# Patient Record
Sex: FEMALE | Race: BLACK OR AFRICAN AMERICAN | NOT HISPANIC OR LATINO | Employment: FULL TIME | URBAN - METROPOLITAN AREA
[De-identification: names, ages, dates, MRNs, and addresses within clinical notes are randomized per-mention and may not be internally consistent; named-entity substitution may affect disease eponyms.]

---

## 2020-02-11 ENCOUNTER — OFFICE VISIT (OUTPATIENT)
Dept: OBSTETRICS AND GYNECOLOGY | Age: 60
End: 2020-02-11

## 2020-02-11 VITALS
BODY MASS INDEX: 22.82 KG/M2 | WEIGHT: 163 LBS | DIASTOLIC BLOOD PRESSURE: 90 MMHG | HEIGHT: 71 IN | SYSTOLIC BLOOD PRESSURE: 138 MMHG

## 2020-02-11 DIAGNOSIS — Z12.11 SCREEN FOR COLON CANCER: ICD-10-CM

## 2020-02-11 DIAGNOSIS — Z01.419 ENCOUNTER FOR GYNECOLOGICAL EXAMINATION WITHOUT ABNORMAL FINDING: ICD-10-CM

## 2020-02-11 DIAGNOSIS — Z12.39 SCREENING FOR BREAST CANCER: ICD-10-CM

## 2020-02-11 DIAGNOSIS — N84.1 CERVICAL POLYP: ICD-10-CM

## 2020-02-11 DIAGNOSIS — N92.6 IRREGULAR MENSES: ICD-10-CM

## 2020-02-11 DIAGNOSIS — N85.2 ENLARGED UTERUS: ICD-10-CM

## 2020-02-11 DIAGNOSIS — Z12.4 SCREENING FOR CERVICAL CANCER: ICD-10-CM

## 2020-02-11 DIAGNOSIS — N95.1 MENOPAUSAL SYMPTOMS: ICD-10-CM

## 2020-02-11 DIAGNOSIS — Z11.51 SCREENING FOR HPV (HUMAN PAPILLOMAVIRUS): ICD-10-CM

## 2020-02-11 DIAGNOSIS — Z76.89 ESTABLISHING CARE WITH NEW DOCTOR, ENCOUNTER FOR: Primary | ICD-10-CM

## 2020-02-11 PROCEDURE — 99386 PREV VISIT NEW AGE 40-64: CPT | Performed by: OBSTETRICS & GYNECOLOGY

## 2020-02-11 PROCEDURE — 57500 BIOPSY OF CERVIX: CPT | Performed by: OBSTETRICS & GYNECOLOGY

## 2020-02-11 NOTE — PROGRESS NOTES
"Routine Annual Visit    2020    Patient: Mehnaz Denney          MR#:[id number]      Chief Complaint   Patient presents with   • Establish Care     NGYN.  Randomly has cycles. Has night sweats. Last pap smear 2016, no hx of abnormal paps.  Mg .  She has never c-scope        History of Present Illness    59 y.o. female  who presents for annual exam.   New pt to me, see written gyn  UL payroll  Fibroids  3 years of irregular bleeding  Does have NS  Due for mammo , pap, CSC  BTL  No recent primary care visit            Patient's last menstrual period was 2019.  Obstetric History:  OB History        1    Para   1    Term           1    AB        Living   2       SAB        TAB        Ectopic        Molar        Multiple   1    Live Births   2               Menstrual History:     Patient's last menstrual period was 2019.       Sexual History:       ________________________________________  There is no problem list on file for this patient.      History reviewed. No pertinent past medical history.    Past Surgical History:   Procedure Laterality Date   • D&C WITH SUCTION         Social History     Tobacco Use   Smoking Status Never Smoker   Smokeless Tobacco Never Used       currently has no medications in their medication list.  ________________________________________    Current contraception: tubal ligation  History of abnormal Pap smear: no  Family history of Breast cancer: no, adopted  Family history of uterine or ovarian cancer: no  Family History of colon cancer/colon polyps: no  History of abnormal mammogram: no      The following portions of the patient's history were reviewed and updated as appropriate: allergies, current medications, past family history, past medical history, past social history, past surgical history and problem list.    Review of Systems    Pertinent items are noted in HPI.     Objective   Physical Exam    /90   Ht 180.3 cm (71\")   Wt " "73.9 kg (163 lb)   LMP 11/24/2019   Breastfeeding No   BMI 22.73 kg/m²    BP Readings from Last 3 Encounters:   02/11/20 138/90      Wt Readings from Last 3 Encounters:   02/11/20 73.9 kg (163 lb)      BMI: Estimated body mass index is 22.73 kg/m² as calculated from the following:    Height as of this encounter: 180.3 cm (71\").    Weight as of this encounter: 73.9 kg (163 lb).      General:   alert, appears stated age and cooperative   Abdomen: soft, non-tender, without masses or organomegaly   Breast: inspection negative, no nipple discharge or bleeding, no masses or nodularity palpable   Vulva: normal   Vagina: normal mucosa   Cervix: no cervical motion tenderness and no lesions   Small polyp seen  Betadine prep  Ring forceps used to twist off soft polyp , about 3 mm  Hemostatic  Pt tolerated well   Uterus: bulky or non-tender   Adnexa: no mass, fullness, tenderness     Assessment:    1. Normal annual exam   Assessment     ICD-10-CM ICD-9-CM   1. Establishing care with new doctor, encounter for Z76.89 V65.8   2. Encounter for gynecological examination without abnormal finding Z01.419 V72.31   3. Menopausal symptoms N95.1 627.2   4. Irregular menses N92.6 626.4   5. Screening for cervical cancer Z12.4 V76.2   6. Screening for HPV (human papillomavirus) Z11.51 V73.81   7. Screen for colon cancer Z12.11 V76.51   8. Cervical polyp N84.1 622.7   9. Screening for breast cancer Z12.39 V76.10   10. Enlarged uterus N85.2 621.2     Plan:    Plan     [x]  Mammogram request made  [x]  PAP done  [x]  Labs:   []  GC/Chl/TV  []  DEXA scan   [x]  Referral for colonoscopy:       Mehnaz was seen today for establish care.    Diagnoses and all orders for this visit:    Establishing care with new doctor, encounter for    Encounter for gynecological examination without abnormal finding    Menopausal symptoms    Irregular menses  -     Comprehensive Metabolic Panel  -     Follicle Stimulating Hormone  -     TSH  -     CBC (No " Diff)    Screening for cervical cancer  -     IGP, Apt HPV,rfx 16 / 18,45    Screening for HPV (human papillomavirus)  -     IGP, Apt HPV,rfx 16 / 18,45    Screen for colon cancer  -     Ambulatory Referral For Screening Colonoscopy    Cervical polyp  -     Biopsy Cervix  -     Reference Histopathology    Screening for breast cancer  -     Mammo Screening Bilateral With CAD; Future    Enlarged uterus      Follow up for gyn US and possible endometrial biopsy  Consider ablation if all benign      Counseling:  --Nutrition: Stressed importance of moderation and caloric balance, stressed fresh fruit and vegetables  --Exercise: Stressed the importance of regular exercise. 3-5 times weekly   - Discussed screening mammogram recommendations.   --Discussed benefits of screening colonoscopy- age 45 unless FH  --Discussed pap smear screening recommendations

## 2020-02-12 LAB
ALBUMIN SERPL-MCNC: 4.2 G/DL (ref 3.5–5.2)
ALBUMIN/GLOB SERPL: 1.1 G/DL
ALP SERPL-CCNC: 117 U/L (ref 39–117)
ALT SERPL-CCNC: 17 U/L (ref 1–33)
AST SERPL-CCNC: 23 U/L (ref 1–32)
BILIRUB SERPL-MCNC: 0.5 MG/DL (ref 0.2–1.2)
BUN SERPL-MCNC: 12 MG/DL (ref 6–20)
BUN/CREAT SERPL: 14.6 (ref 7–25)
CALCIUM SERPL-MCNC: 11.1 MG/DL (ref 8.6–10.5)
CHLORIDE SERPL-SCNC: 100 MMOL/L (ref 98–107)
CO2 SERPL-SCNC: 24.5 MMOL/L (ref 22–29)
CREAT SERPL-MCNC: 0.82 MG/DL (ref 0.57–1)
ERYTHROCYTE [DISTWIDTH] IN BLOOD BY AUTOMATED COUNT: 20.6 % (ref 12.3–15.4)
FSH SERPL-ACNC: 66.3 MIU/ML
GLOBULIN SER CALC-MCNC: 3.7 GM/DL
GLUCOSE SERPL-MCNC: 98 MG/DL (ref 65–99)
HCT VFR BLD AUTO: 35.3 % (ref 34–46.6)
HGB BLD-MCNC: 11 G/DL (ref 12–15.9)
MCH RBC QN AUTO: 20.2 PG (ref 26.6–33)
MCHC RBC AUTO-ENTMCNC: 31.2 G/DL (ref 31.5–35.7)
MCV RBC AUTO: 64.8 FL (ref 79–97)
PLATELET # BLD AUTO: 187 10*3/MM3 (ref 140–450)
POTASSIUM SERPL-SCNC: 4.2 MMOL/L (ref 3.5–5.2)
PROT SERPL-MCNC: 7.9 G/DL (ref 6–8.5)
RBC # BLD AUTO: 5.45 10*6/MM3 (ref 3.77–5.28)
SODIUM SERPL-SCNC: 138 MMOL/L (ref 136–145)
TSH SERPL DL<=0.005 MIU/L-ACNC: 1.73 UIU/ML (ref 0.27–4.2)
WBC # BLD AUTO: 5.97 10*3/MM3 (ref 3.4–10.8)

## 2020-02-12 RX ORDER — FERROUS SULFATE 325(65) MG
325 TABLET ORAL
Qty: 90 TABLET | Refills: 1 | Status: SHIPPED | OUTPATIENT
Start: 2020-02-12 | End: 2020-11-09 | Stop reason: SDUPTHER

## 2020-02-13 LAB
DX ICD CODE: NORMAL
PATH REPORT.FINAL DX SPEC: NORMAL
PATH REPORT.GROSS SPEC: NORMAL
PATH REPORT.SITE OF ORIGIN SPEC: NORMAL
PATHOLOGIST NAME: NORMAL
PAYMENT PROCEDURE: NORMAL

## 2020-02-14 LAB
CYTOLOGIST CVX/VAG CYTO: ABNORMAL
CYTOLOGY CVX/VAG DOC CYTO: ABNORMAL
CYTOLOGY CVX/VAG DOC THIN PREP: ABNORMAL
DX ICD CODE: ABNORMAL
DX ICD CODE: ABNORMAL
HIV 1 & 2 AB SER-IMP: ABNORMAL
HPV I/H RISK 4 DNA CVX QL PROBE+SIG AMP: NEGATIVE
Lab: ABNORMAL
OTHER STN SPEC: ABNORMAL
PATHOLOGIST CVX/VAG CYTO: ABNORMAL
RECOM F/U CVX/VAG CYTO: ABNORMAL
STAT OF ADQ CVX/VAG CYTO-IMP: ABNORMAL

## 2020-02-17 ENCOUNTER — TELEPHONE (OUTPATIENT)
Dept: OBSTETRICS AND GYNECOLOGY | Age: 60
End: 2020-02-17

## 2020-02-17 NOTE — TELEPHONE ENCOUNTER
----- Message from Mabel Soto MD sent at 2/17/2020  7:41 AM EST -----  Notify pap is abnormal, possible endometrial abnormality.  Our plan was to follow up with an US and endometrial biopsy anyway.  I would like to move up her appt to this week.  This afternoon or in the next 2 days, she will need US and endometrial biopsy.  Work in with me

## 2020-02-17 NOTE — TELEPHONE ENCOUNTER
PT NOTIFIED.  SHE COULD NOT COME IN TOMORROW.  SHE PREFERS Wednesday MORNING.  I DO NOT SEE AN ULTRASOUND. COULD WE WORK IN??

## 2020-02-18 ENCOUNTER — APPOINTMENT (OUTPATIENT)
Dept: WOMENS IMAGING | Facility: HOSPITAL | Age: 60
End: 2020-02-18

## 2020-02-18 ENCOUNTER — PROCEDURE VISIT (OUTPATIENT)
Dept: OBSTETRICS AND GYNECOLOGY | Age: 60
End: 2020-02-18

## 2020-02-18 DIAGNOSIS — Z12.31 VISIT FOR SCREENING MAMMOGRAM: Primary | ICD-10-CM

## 2020-02-18 PROCEDURE — 77067 SCR MAMMO BI INCL CAD: CPT | Performed by: OBSTETRICS & GYNECOLOGY

## 2020-02-18 PROCEDURE — 77067 SCR MAMMO BI INCL CAD: CPT | Performed by: RADIOLOGY

## 2020-02-18 NOTE — TELEPHONE ENCOUNTER
Would you please put on Dr. Soto's schedule at 10:30 and U/S at 10:30  Gyn endo bx  Pt notified of appt time     Thanks

## 2020-02-19 ENCOUNTER — PROCEDURE VISIT (OUTPATIENT)
Dept: OBSTETRICS AND GYNECOLOGY | Age: 60
End: 2020-02-19

## 2020-02-19 ENCOUNTER — OFFICE VISIT (OUTPATIENT)
Dept: OBSTETRICS AND GYNECOLOGY | Age: 60
End: 2020-02-19

## 2020-02-19 VITALS
DIASTOLIC BLOOD PRESSURE: 92 MMHG | HEIGHT: 71 IN | WEIGHT: 158 LBS | BODY MASS INDEX: 22.12 KG/M2 | SYSTOLIC BLOOD PRESSURE: 160 MMHG

## 2020-02-19 DIAGNOSIS — R87.619 ENDOMETRIAL CELLS ON CERVICAL PAP SMEAR INCONSISTENT WITH LAST MENSTRUAL PERIOD: ICD-10-CM

## 2020-02-19 DIAGNOSIS — D50.0 IRON DEFICIENCY ANEMIA DUE TO CHRONIC BLOOD LOSS: ICD-10-CM

## 2020-02-19 DIAGNOSIS — N92.6 IRREGULAR MENSES: Primary | ICD-10-CM

## 2020-02-19 DIAGNOSIS — D25.1 INTRAMURAL LEIOMYOMA OF UTERUS: ICD-10-CM

## 2020-02-19 PROCEDURE — 99213 OFFICE O/P EST LOW 20 MIN: CPT | Performed by: OBSTETRICS & GYNECOLOGY

## 2020-02-19 PROCEDURE — 76830 TRANSVAGINAL US NON-OB: CPT | Performed by: OBSTETRICS & GYNECOLOGY

## 2020-02-19 PROCEDURE — 58100 BIOPSY OF UTERUS LINING: CPT | Performed by: OBSTETRICS & GYNECOLOGY

## 2020-02-19 NOTE — PROGRESS NOTES
"GYN Visit    2020    Patient: Mehnaz Denney          MR#:[id number]      Chief Complaint   Patient presents with   • Follow-up     ENDO BX AND U/S WITH DW.        History of Present Illness    59 y.o. female  who presents for  Follow up to irregular menses  Also endometrial cells on pap  Tests menopausal  Had a cervical polyp which was removed  See US , bulky fibroid uterus, 5 fibroids measured  rec endometrial biopsy but lining was fairly thin        No LMP recorded. Patient is premenopausal.    ________________________________________  There is no problem list on file for this patient.      Past Medical History:   Diagnosis Date   • Uterine fibroid        Past Surgical History:   Procedure Laterality Date   •  SECTION      TWINS   • D&C WITH SUCTION     • D&C WITH SUCTION     • LAPAROSCOPIC TUBAL LIGATION         Social History     Tobacco Use   Smoking Status Never Smoker   Smokeless Tobacco Never Used       has a current medication list which includes the following prescription(s): ferrous sulfate.  ________________________________________    Current contraception: tubal ligation      The following portions of the patient's history were reviewed and updated as appropriate: allergies, current medications, past family history, past medical history, past social history, past surgical history and problem list.    Review of Systems    Pertinent items are noted in HPI.     Objective   Physical Exam    /92   Ht 180.3 cm (71\")   Wt 71.7 kg (158 lb)   Breastfeeding No   BMI 22.04 kg/m²    BP Readings from Last 3 Encounters:   20 160/92   20 138/90      Wt Readings from Last 3 Encounters:   20 71.7 kg (158 lb)   20 73.9 kg (163 lb)      BMI: Estimated body mass index is 22.04 kg/m² as calculated from the following:    Height as of this encounter: 180.3 cm (71\").    Weight as of this encounter: 71.7 kg (158 lb).    Lungs: non labored breathing, no wheezing " or tachpnea  Extremities: extremities normal, atraumatic, no cyanosis or edema  Skin: Skin color, texture, turgor normal. No rashes or lesions  Neurologic: Grossly normal  General:   alert, appears stated age and cooperative   Abdomen: soft, non-tender, without masses or organomegaly       Vulva: normal   Vagina: normal mucosa   Cervix: no cervical motion tenderness and no lesions   Uterus: bulky or non-tender   Adnexa: no mass, fullness, tenderness     Endometrial Biopsy Procedure Note    Pre-operative Diagnosis: abnormal pap    Post-operative Diagnosis: same    Indications: abnormal uterine bleeding, enlarged uterus    Procedure Details    Urine pregnancy test was not done.  The risks (including infection, bleeding, pain, and uterine perforation) and benefits of the procedure were explained to the patient and Verbal informed consent was obtained.  Antibiotic prophylaxis against endocarditis was not indicated.     The patient was placed in the dorsal lithotomy position.  Bimanual exam showed the uterus to be in the neutral position.  A Graves' speculum inserted in the vagina, and the cervix prepped with povidone iodine.  Endocervical curettage with a Kevorkian curette was not performed.     A sharp tenaculum was applied to the anterior lip of the cervix for stabilization.  A sterile uterine sound was used to sound the uterus to a depth of 7cm.  A Pipelle endometrial aspirator was used to sample the endometrium.  Sample was sent for pathologic examination.  2 passes   Very little tissue, atrophic type feel    Condition:  Stable    Complications:  None  Patient tolerated the procedure well without complications.    Plan:    The patient was advised to call for any fever or for prolonged or severe pain or bleeding. She was advised to use OTC ibuprofen as needed for mild to moderate pain. She was advised to avoid vaginal intercourse for 48 hours or until the bleeding has completely stopped.    Attending Physician  Documentation:  I was present for or participated in the entire procedure, including opening and closing.   Assessment:      Mehnaz was seen today for follow-up.    Diagnoses and all orders for this visit:    Irregular menses  -     Biopsy Endometrial    Intramural leiomyoma of uterus    Endometrial cells on cervical Pap smear inconsistent with last menstrual period    Iron deficiency anemia due to chronic blood loss      Take iron daily  Follow up 3 months to evaluate fibroid stability  Await biopsy result  Consider hysterectomy  Pt cannot take time off work right now, she tests menopausal and if no bleeding and negative biopsy can consider following fibroids which should slowly decrease in size

## 2020-02-24 ENCOUNTER — TELEPHONE (OUTPATIENT)
Dept: OBSTETRICS AND GYNECOLOGY | Age: 60
End: 2020-02-24

## 2020-02-24 NOTE — TELEPHONE ENCOUNTER
----- Message from Mabel Soto MD sent at 2/24/2020  1:07 PM EST -----  Notify negative biopsy , good

## 2020-02-26 ENCOUNTER — TELEPHONE (OUTPATIENT)
Dept: OBSTETRICS AND GYNECOLOGY | Age: 60
End: 2020-02-26

## 2020-02-26 DIAGNOSIS — R92.8 ABNORMAL MAMMOGRAM: Primary | ICD-10-CM

## 2020-02-26 NOTE — TELEPHONE ENCOUNTER
----- Message from Mabel Soto MD sent at 2/26/2020  9:07 AM EST -----  Notify radiologist requests bilateral breast US, appears may have breast cysts, these are usually benign, I will place order

## 2020-03-04 ENCOUNTER — OFFICE VISIT (OUTPATIENT)
Dept: FAMILY MEDICINE CLINIC | Facility: CLINIC | Age: 60
End: 2020-03-04

## 2020-03-04 VITALS
RESPIRATION RATE: 18 BRPM | OXYGEN SATURATION: 100 % | HEART RATE: 95 BPM | WEIGHT: 160 LBS | SYSTOLIC BLOOD PRESSURE: 166 MMHG | HEIGHT: 71 IN | TEMPERATURE: 98.7 F | DIASTOLIC BLOOD PRESSURE: 86 MMHG | BODY MASS INDEX: 22.4 KG/M2

## 2020-03-04 DIAGNOSIS — D50.0 IRON DEFICIENCY ANEMIA DUE TO CHRONIC BLOOD LOSS: ICD-10-CM

## 2020-03-04 DIAGNOSIS — I10 ESSENTIAL HYPERTENSION: ICD-10-CM

## 2020-03-04 DIAGNOSIS — Z13.220 SCREENING, LIPID: Primary | ICD-10-CM

## 2020-03-04 PROCEDURE — 99204 OFFICE O/P NEW MOD 45 MIN: CPT | Performed by: INTERNAL MEDICINE

## 2020-03-04 RX ORDER — LISINOPRIL 10 MG/1
10 TABLET ORAL DAILY
Qty: 30 TABLET | Refills: 5 | Status: SHIPPED | OUTPATIENT
Start: 2020-03-04 | End: 2020-05-04

## 2020-03-04 NOTE — PROGRESS NOTES
Subjective   Mehnaz Denney is a 59 y.o. female. Patient is here today for   Chief Complaint   Patient presents with   • Establish Care   • Hypertension          Vitals:    03/04/20 0858   BP: 166/86   Pulse: 95   Resp: 18   Temp: 98.7 °F (37.1 °C)   SpO2: 100%     Body mass index is 22.33 kg/m².      Past Medical History:   Diagnosis Date   • Uterine fibroid       No Known Allergies   Social History     Socioeconomic History   • Marital status: Single     Spouse name: Not on file   • Number of children: Not on file   • Years of education: Not on file   • Highest education level: Not on file   Occupational History     Employer: [employer]   Tobacco Use   • Smoking status: Never Smoker   • Smokeless tobacco: Never Used   Substance and Sexual Activity   • Alcohol use: Never     Frequency: Never   • Drug use: Never        Current Outpatient Medications:   •  ferrous sulfate 325 (65 FE) MG tablet, Take 1 tablet by mouth Daily With Breakfast., Disp: 90 tablet, Rfl: 1  •  Multiple Vitamin (MULTI-VITAMIN DAILY PO), Take  by mouth Daily., Disp: , Rfl:   •  lisinopril (PRINIVIL,ZESTRIL) 10 MG tablet, Take 1 tablet by mouth Daily., Disp: 30 tablet, Rfl: 5     Objective     This pleasant 59-year-old patient is new to my office.  She had been seen by her oncologist recently and was told that her blood pressure was just a little elevated.    A friend of hers, and a patient of mine (Ms. Bianca Ayers) had recommended that she see me.    Apart from her concern about her blood pressure, she is going for further imaging studies regarding some breast cysts.    She does not smoke cigarettes, she drinks alcohol infrequently, she is adopted and does not know her family history.    She has 2 children I believe, 1 of them has hypertension.        Hypertension   This is a new problem. The current episode started more than 1 month ago. The problem has been waxing and waning since onset. The problem is resistant. Pertinent  negatives include no anxiety, blurred vision, chest pain, headaches, malaise/fatigue, neck pain, orthopnea, palpitations, peripheral edema, PND, shortness of breath or sweats. There are no associated agents to hypertension. There are no known risk factors for coronary artery disease. There are no compliance problems.         Review of Systems   Constitutional: Negative.  Negative for malaise/fatigue.   HENT: Negative.    Eyes: Negative for blurred vision.   Respiratory: Negative for shortness of breath.    Cardiovascular: Negative for chest pain, palpitations, orthopnea and PND.   Genitourinary: Negative.    Musculoskeletal: Negative for neck pain.   Neurological: Negative for headaches.   Psychiatric/Behavioral: Negative.        Physical Exam   Constitutional: She is oriented to person, place, and time. She appears well-developed and well-nourished.   Pleasant, neatly groomed, BMI 22.3.   HENT:   Head: Normocephalic and atraumatic.   Neck:   No carotid bruits.   Cardiovascular: Normal rate, regular rhythm and normal heart sounds. Exam reveals no friction rub.   No murmur heard.  Pulmonary/Chest: Effort normal and breath sounds normal. No stridor. No respiratory distress. She has no wheezes. She has no rales.   Neurological: She is alert and oriented to person, place, and time.   Psychiatric: She has a normal mood and affect. Her behavior is normal. Thought content normal.   Nursing note and vitals reviewed.        Problem List Items Addressed This Visit        Cardiovascular and Mediastinum    Essential hypertension    Relevant Medications    lisinopril (PRINIVIL,ZESTRIL) 10 MG tablet       Hematopoietic and Hemostatic    Iron deficiency anemia due to chronic blood loss    Relevant Orders    Iron Profile    CBC & Differential      Other Visit Diagnoses     Screening, lipid    -  Primary    Relevant Orders    Lipid Panel With LDL / HDL Ratio            PLAN  Like to have her back in 2 to 3 weeks.  Prior to that  visit, I would like to get an EKG and a chest x-ray just to look for any end-stage organ damage following longstanding hypertension.    A CBC, comprehensive metabolic panel and thyroid were checked recently by her gynecologist.    I would like to check a CBC, iron level, and a screening lipid profile.    We will start her on lisinopril 10 mg daily.  Return in about 3 weeks (around 3/25/2020) for ekg and cxr before next visit.  Answers for HPI/ROS submitted by the patient on 2/27/2020   Hypertension  What is the primary reason for your visit?: High Blood Pressure

## 2020-03-05 LAB
BASOPHILS # BLD AUTO: ABNORMAL 10*3/UL
BASOPHILS # BLD MANUAL: 0.03 10*3/MM3 (ref 0–0.2)
BASOPHILS NFR BLD MANUAL: 1 % (ref 0–1.5)
CHOLEST SERPL-MCNC: 208 MG/DL (ref 0–200)
DIFFERENTIAL COMMENT: ABNORMAL
EOSINOPHIL # BLD AUTO: ABNORMAL 10*3/UL
EOSINOPHIL # BLD MANUAL: 0.17 10*3/MM3 (ref 0–0.4)
EOSINOPHIL NFR BLD AUTO: ABNORMAL %
EOSINOPHIL NFR BLD MANUAL: 6 % (ref 0.3–6.2)
ERYTHROCYTE [DISTWIDTH] IN BLOOD BY AUTOMATED COUNT: 21 % (ref 12.3–15.4)
HCT VFR BLD AUTO: 37.8 % (ref 34–46.6)
HDLC SERPL-MCNC: 64 MG/DL (ref 40–60)
HGB BLD-MCNC: 11.5 G/DL (ref 12–15.9)
IRON SATN MFR SERPL: 8 % (ref 20–50)
IRON SERPL-MCNC: 33 MCG/DL (ref 37–145)
LDLC SERPL CALC-MCNC: 126 MG/DL (ref 0–100)
LDLC/HDLC SERPL: 1.96 {RATIO}
LYMPHOCYTES # BLD AUTO: ABNORMAL 10*3/UL
LYMPHOCYTES # BLD MANUAL: 0.67 10*3/MM3 (ref 0.7–3.1)
LYMPHOCYTES NFR BLD AUTO: ABNORMAL %
LYMPHOCYTES NFR BLD MANUAL: 24 % (ref 19.6–45.3)
MCH RBC QN AUTO: 20.3 PG (ref 26.6–33)
MCHC RBC AUTO-ENTMCNC: 30.4 G/DL (ref 31.5–35.7)
MCV RBC AUTO: 66.7 FL (ref 79–97)
MONOCYTES # BLD MANUAL: 0.22 10*3/MM3 (ref 0.1–0.9)
MONOCYTES NFR BLD AUTO: ABNORMAL %
MONOCYTES NFR BLD MANUAL: 8 % (ref 5–12)
NEUTROPHILS # BLD MANUAL: 1.71 10*3/MM3 (ref 1.7–7)
NEUTROPHILS NFR BLD AUTO: ABNORMAL %
NEUTROPHILS NFR BLD MANUAL: 61 % (ref 42.7–76)
PLATELET # BLD AUTO: 222 10*3/MM3 (ref 140–450)
PLATELET BLD QL SMEAR: ABNORMAL
RBC # BLD AUTO: 5.67 10*6/MM3 (ref 3.77–5.28)
RBC MORPH BLD: ABNORMAL
TIBC SERPL-MCNC: 410 MCG/DL
TRIGL SERPL-MCNC: 92 MG/DL (ref 0–150)
UIBC SERPL-MCNC: 377 MCG/DL (ref 112–346)
VLDLC SERPL CALC-MCNC: 18.4 MG/DL
WBC # BLD AUTO: 2.81 10*3/MM3 (ref 3.4–10.8)

## 2020-05-04 ENCOUNTER — OFFICE VISIT (OUTPATIENT)
Dept: FAMILY MEDICINE CLINIC | Facility: CLINIC | Age: 60
End: 2020-05-04

## 2020-05-04 VITALS
BODY MASS INDEX: 22.62 KG/M2 | WEIGHT: 158 LBS | HEIGHT: 70 IN | OXYGEN SATURATION: 98 % | DIASTOLIC BLOOD PRESSURE: 82 MMHG | TEMPERATURE: 98.2 F | HEART RATE: 102 BPM | SYSTOLIC BLOOD PRESSURE: 130 MMHG | RESPIRATION RATE: 16 BRPM

## 2020-05-04 DIAGNOSIS — D50.0 IRON DEFICIENCY ANEMIA DUE TO CHRONIC BLOOD LOSS: ICD-10-CM

## 2020-05-04 DIAGNOSIS — I10 ESSENTIAL HYPERTENSION: Primary | ICD-10-CM

## 2020-05-04 PROCEDURE — 99213 OFFICE O/P EST LOW 20 MIN: CPT | Performed by: INTERNAL MEDICINE

## 2020-05-04 PROCEDURE — 93000 ELECTROCARDIOGRAM COMPLETE: CPT | Performed by: INTERNAL MEDICINE

## 2020-05-04 RX ORDER — LISINOPRIL AND HYDROCHLOROTHIAZIDE 12.5; 1 MG/1; MG/1
1 TABLET ORAL DAILY
Qty: 30 TABLET | Refills: 5 | Status: SHIPPED | OUTPATIENT
Start: 2020-05-04 | End: 2020-10-05 | Stop reason: SDUPTHER

## 2020-05-08 ENCOUNTER — DOCUMENTATION (OUTPATIENT)
Dept: OBSTETRICS AND GYNECOLOGY | Age: 60
End: 2020-05-08

## 2020-05-20 ENCOUNTER — OFFICE VISIT (OUTPATIENT)
Dept: OBSTETRICS AND GYNECOLOGY | Age: 60
End: 2020-05-20

## 2020-05-20 ENCOUNTER — PROCEDURE VISIT (OUTPATIENT)
Dept: OBSTETRICS AND GYNECOLOGY | Age: 60
End: 2020-05-20

## 2020-05-20 VITALS
BODY MASS INDEX: 22.12 KG/M2 | SYSTOLIC BLOOD PRESSURE: 118 MMHG | DIASTOLIC BLOOD PRESSURE: 70 MMHG | WEIGHT: 158 LBS | HEIGHT: 71 IN

## 2020-05-20 DIAGNOSIS — I10 ESSENTIAL HYPERTENSION: ICD-10-CM

## 2020-05-20 DIAGNOSIS — D50.0 IRON DEFICIENCY ANEMIA DUE TO CHRONIC BLOOD LOSS: ICD-10-CM

## 2020-05-20 DIAGNOSIS — D25.1 INTRAMURAL LEIOMYOMA OF UTERUS: ICD-10-CM

## 2020-05-20 DIAGNOSIS — D25.9 UTERINE LEIOMYOMA, UNSPECIFIED LOCATION: Primary | ICD-10-CM

## 2020-05-20 DIAGNOSIS — D50.0 IRON DEFICIENCY ANEMIA DUE TO CHRONIC BLOOD LOSS: Primary | ICD-10-CM

## 2020-05-20 PROCEDURE — 99213 OFFICE O/P EST LOW 20 MIN: CPT | Performed by: PHYSICIAN ASSISTANT

## 2020-05-20 PROCEDURE — 76830 TRANSVAGINAL US NON-OB: CPT | Performed by: OBSTETRICS & GYNECOLOGY

## 2020-05-20 NOTE — PROGRESS NOTES
"Subjective     Chief Complaint   Patient presents with   • Fibroids     3 month follow up on fibroids.       Mehnaz Denney is a 59 y.o.  whose LMP is No LMP recorded. Patient is premenopausal. presents for f/u of her fibroids  She is doing well  Feels better on iron and her BP is better controlled  Has been seeing her PCP regularly  Feels better gynecologically as well  No VB  Does note occasional heaviness and bladder frequency    Pt of Dr Soto      No Additional Complaints Reported    The following portions of the patient's history were reviewed and updated as appropriate:vital signs, allergies, current medications, past family history, past medical history, past social history, past surgical history and problem list      Review of Systems   Genitourinary:positive for fibroid f/u     Objective      /70   Ht 180.3 cm (71\")   Wt 71.7 kg (158 lb)   Breastfeeding No   BMI 22.04 kg/m²     Physical Exam    General:   alert, comfortable and no distress   Heart: Not performed today   Lungs: Not performed today.   Breast: Not performed today   Neck: na   Abdomen: {Not performed today   CVA: Not performed today   Pelvis: Not performed today   Extremities: Not performed today   Neurologic: negative   Psychiatric: Normal affect, judgement, and mood       Lab Review   Labs: No data reviewed     Imaging   Ultrasound - Pelvic Vaginal  Endo thickness measures 7.91 mm. 6 fibroids noted measuring 3.1 x 4.2, 3.6 x 2.4, 2.8 x 3.2, 3.2 x  3.3, 2.3 x 1.7, 4.0 x 3.3 cm    Assessment/Plan     ASSESSMENT  1. Uterine leiomyoma, unspecified location    2. Iron deficiency anemia due to chronic blood loss        PLAN  1. Fibroids stable.  Pt would like to have hysterectomy at some point d/t the \"heaviness\" she feels.  Would like to f/u in August to discuss this further with dr Soto. Is waiting for her insurance to be active for at lease 1 year to be covered for FMLA    C/w iron daily.  Call for any problems. Will f/u " with PCP in August as well for rpt labs    Follow up: 3 month(s)    JUAN F Mims  5/20/2020

## 2020-08-04 DIAGNOSIS — I10 ESSENTIAL HYPERTENSION: ICD-10-CM

## 2020-08-04 DIAGNOSIS — D50.0 IRON DEFICIENCY ANEMIA DUE TO CHRONIC BLOOD LOSS: Primary | ICD-10-CM

## 2020-09-02 LAB
ALBUMIN SERPL-MCNC: 4.2 G/DL (ref 3.5–5.2)
ALBUMIN/GLOB SERPL: 1.2 G/DL
ALP SERPL-CCNC: 91 U/L (ref 39–117)
ALT SERPL-CCNC: 14 U/L (ref 1–33)
AST SERPL-CCNC: 18 U/L (ref 1–32)
BASOPHILS # BLD MANUAL: 0 10*3/MM3 (ref 0–0.2)
BASOPHILS NFR BLD MANUAL: 0 % (ref 0–1.5)
BILIRUB SERPL-MCNC: 0.5 MG/DL (ref 0–1.2)
BUN SERPL-MCNC: 11 MG/DL (ref 6–20)
BUN/CREAT SERPL: 12.5 (ref 7–25)
CALCIUM SERPL-MCNC: 11 MG/DL (ref 8.6–10.5)
CHLORIDE SERPL-SCNC: 103 MMOL/L (ref 98–107)
CHOLEST SERPL-MCNC: 227 MG/DL (ref 0–200)
CO2 SERPL-SCNC: 30.2 MMOL/L (ref 22–29)
CREAT SERPL-MCNC: 0.88 MG/DL (ref 0.57–1)
DIFFERENTIAL COMMENT: NORMAL
EOSINOPHIL # BLD MANUAL: 0.18 10*3/MM3 (ref 0–0.4)
EOSINOPHIL NFR BLD MANUAL: 5 % (ref 0.3–6.2)
ERYTHROCYTE [DISTWIDTH] IN BLOOD BY AUTOMATED COUNT: 14.8 % (ref 12.3–15.4)
GLOBULIN SER CALC-MCNC: 3.5 GM/DL
GLUCOSE SERPL-MCNC: 93 MG/DL (ref 65–99)
HCT VFR BLD AUTO: 36.5 % (ref 34–46.6)
HDLC SERPL-MCNC: 61 MG/DL (ref 40–60)
HGB BLD-MCNC: 12.1 G/DL (ref 12–15.9)
IRON SATN MFR SERPL: 10 % (ref 20–50)
IRON SERPL-MCNC: 36 MCG/DL (ref 37–145)
LDLC SERPL CALC-MCNC: 151 MG/DL (ref 0–100)
LDLC/HDLC SERPL: 2.47 {RATIO}
LYMPHOCYTES # BLD MANUAL: 0.76 10*3/MM3 (ref 0.7–3.1)
LYMPHOCYTES NFR BLD MANUAL: 21 % (ref 19.6–45.3)
MCH RBC QN AUTO: 24.6 PG (ref 26.6–33)
MCHC RBC AUTO-ENTMCNC: 33.2 G/DL (ref 31.5–35.7)
MCV RBC AUTO: 74.2 FL (ref 79–97)
MONOCYTES # BLD MANUAL: 0.29 10*3/MM3 (ref 0.1–0.9)
MONOCYTES NFR BLD MANUAL: 8 % (ref 5–12)
NEUTROPHILS # BLD MANUAL: 2.38 10*3/MM3 (ref 1.7–7)
NEUTROPHILS NFR BLD MANUAL: 66 % (ref 42.7–76)
NRBC BLD AUTO-RTO: 0 /100 WBC (ref 0–0.2)
PLATELET # BLD AUTO: 186 10*3/MM3 (ref 140–450)
PLATELET BLD QL SMEAR: NORMAL
POTASSIUM SERPL-SCNC: 3.6 MMOL/L (ref 3.5–5.2)
PROT SERPL-MCNC: 7.7 G/DL (ref 6–8.5)
RBC # BLD AUTO: 4.92 10*6/MM3 (ref 3.77–5.28)
RBC MORPH BLD: NORMAL
SODIUM SERPL-SCNC: 139 MMOL/L (ref 136–145)
TIBC SERPL-MCNC: 347 MCG/DL
TRIGL SERPL-MCNC: 77 MG/DL (ref 0–150)
UIBC SERPL-MCNC: 311 MCG/DL (ref 112–346)
VLDLC SERPL CALC-MCNC: 15.4 MG/DL (ref 5–40)
WBC # BLD AUTO: 3.61 10*3/MM3 (ref 3.4–10.8)

## 2020-09-10 ENCOUNTER — OFFICE VISIT (OUTPATIENT)
Dept: FAMILY MEDICINE CLINIC | Facility: CLINIC | Age: 60
End: 2020-09-10

## 2020-09-10 VITALS
BODY MASS INDEX: 22.85 KG/M2 | HEIGHT: 71 IN | OXYGEN SATURATION: 98 % | WEIGHT: 163.2 LBS | DIASTOLIC BLOOD PRESSURE: 60 MMHG | TEMPERATURE: 98.2 F | HEART RATE: 59 BPM | RESPIRATION RATE: 18 BRPM | SYSTOLIC BLOOD PRESSURE: 104 MMHG

## 2020-09-10 DIAGNOSIS — E78.00 HYPERCHOLESTEROLEMIA: ICD-10-CM

## 2020-09-10 DIAGNOSIS — I10 ESSENTIAL HYPERTENSION: ICD-10-CM

## 2020-09-10 DIAGNOSIS — D50.0 IRON DEFICIENCY ANEMIA DUE TO CHRONIC BLOOD LOSS: Primary | ICD-10-CM

## 2020-09-10 PROCEDURE — 99214 OFFICE O/P EST MOD 30 MIN: CPT | Performed by: INTERNAL MEDICINE

## 2020-09-13 PROBLEM — E78.00 HYPERCHOLESTEROLEMIA: Status: RESOLVED | Noted: 2020-09-13 | Resolved: 2020-09-13

## 2020-09-13 PROBLEM — E78.00 HYPERCHOLESTEROLEMIA: Status: ACTIVE | Noted: 2020-09-13

## 2020-09-13 NOTE — PROGRESS NOTES
Subjective   Mehnaz Denney is a 60 y.o. female. Patient is here today for   Chief Complaint   Patient presents with   • Hypertension     lab f/u.           Vitals:    09/10/20 1512   BP: 104/60   Pulse: 59   Resp: 18   Temp: 98.2 °F (36.8 °C)   SpO2: 98%     Body mass index is 22.77 kg/m².      Past Medical History:   Diagnosis Date   • Uterine fibroid       No Known Allergies   Social History     Socioeconomic History   • Marital status: Single     Spouse name: Not on file   • Number of children: Not on file   • Years of education: Not on file   • Highest education level: Not on file   Occupational History     Employer: [employer]   Tobacco Use   • Smoking status: Never Smoker   • Smokeless tobacco: Never Used   Substance and Sexual Activity   • Alcohol use: Never     Frequency: Never   • Drug use: Never   • Sexual activity: Defer     Partners: Male     Birth control/protection: Tubal ligation        Current Outpatient Medications:   •  ferrous sulfate 325 (65 FE) MG tablet, Take 1 tablet by mouth Daily With Breakfast., Disp: 90 tablet, Rfl: 1  •  lisinopril-hydrochlorothiazide (Zestoretic) 10-12.5 MG per tablet, Take 1 tablet by mouth Daily., Disp: 30 tablet, Rfl: 5  •  Multiple Vitamin (MULTI-VITAMIN DAILY PO), Take  by mouth Daily., Disp: , Rfl:      Objective     This pleasant lady is here to follow-up on hypertension and labs done last week.    Hypertension         Review of Systems   Constitutional: Negative.    HENT: Negative.    Respiratory: Negative.    Cardiovascular: Negative.    Musculoskeletal: Negative.    Psychiatric/Behavioral: Negative.        Physical Exam  Vitals signs and nursing note reviewed.   Constitutional:       Appearance: Normal appearance. She is normal weight.      Comments: Pleasant, neatly groomed, in no distress.   Cardiovascular:      Rate and Rhythm: Normal rate and regular rhythm.   Pulmonary:      Effort: Pulmonary effort is normal.      Breath sounds: Normal  breath sounds.   Neurological:      Mental Status: She is alert.   Psychiatric:         Mood and Affect: Mood normal.         Behavior: Behavior normal.           Problem List Items Addressed This Visit        Cardiovascular and Mediastinum    Essential hypertension    Hypercholesterolemia       Hematopoietic and Hemostatic    Iron deficiency anemia due to chronic blood loss - Primary            PLAN  Her hypertension is well controlled    Her anemia has resolved though she does remain active.  Stopping iron supplements recently.  I asked her to revert back to her 25 mg with 65 elemental iron once daily.    Cholesterol did not advance atypical her.    I asked her to follow-up once yearly for a comprehensive physical exam.    I will see her back in about 6 months.  Fasting labs prior to that visit include: Iron studies, CBC, comprehensive metabolic, CBC, urinalysis, lipid profile.  No follow-ups on file.

## 2020-10-02 ENCOUNTER — TELEPHONE (OUTPATIENT)
Dept: OBSTETRICS AND GYNECOLOGY | Age: 60
End: 2020-10-02

## 2020-10-02 NOTE — TELEPHONE ENCOUNTER
Patient is calling to request a New RX for the following.      lisinopril-hydrochlorothiazide (Zestoretic) 10-12.5 MG per tablet         [place] [id number] - [facility] [address] [facility] - [phone]  - [phone] FX  [phone]    Please advise.    Patient call back [phone]    Patient is completely out of the medication.

## 2020-10-05 RX ORDER — LISINOPRIL AND HYDROCHLOROTHIAZIDE 12.5; 1 MG/1; MG/1
1 TABLET ORAL DAILY
Qty: 30 TABLET | Refills: 5 | Status: SHIPPED | OUTPATIENT
Start: 2020-10-05 | End: 2021-01-20

## 2020-10-26 ENCOUNTER — TELEPHONE (OUTPATIENT)
Dept: OBSTETRICS AND GYNECOLOGY | Age: 60
End: 2020-10-26

## 2020-10-26 NOTE — TELEPHONE ENCOUNTER
(Charles pt) PT called, states she is menopausal.  Has had some pinkish spotting last night.  Spotting has stopped for now but wanted to know if she need to be seen.    Please advise.    [phone]

## 2020-11-03 ENCOUNTER — PROCEDURE VISIT (OUTPATIENT)
Dept: OBSTETRICS AND GYNECOLOGY | Age: 60
End: 2020-11-03

## 2020-11-03 ENCOUNTER — OFFICE VISIT (OUTPATIENT)
Dept: OBSTETRICS AND GYNECOLOGY | Age: 60
End: 2020-11-03

## 2020-11-03 VITALS
BODY MASS INDEX: 22.26 KG/M2 | HEIGHT: 71 IN | WEIGHT: 159 LBS | SYSTOLIC BLOOD PRESSURE: 115 MMHG | DIASTOLIC BLOOD PRESSURE: 64 MMHG

## 2020-11-03 DIAGNOSIS — N95.0 PMB (POSTMENOPAUSAL BLEEDING): Primary | ICD-10-CM

## 2020-11-03 DIAGNOSIS — D25.1 INTRAMURAL LEIOMYOMA OF UTERUS: ICD-10-CM

## 2020-11-03 DIAGNOSIS — N95.0 POSTMENOPAUSAL BLEEDING: Primary | ICD-10-CM

## 2020-11-03 PROCEDURE — 76830 TRANSVAGINAL US NON-OB: CPT | Performed by: OBSTETRICS & GYNECOLOGY

## 2020-11-03 PROCEDURE — 99213 OFFICE O/P EST LOW 20 MIN: CPT | Performed by: OBSTETRICS & GYNECOLOGY

## 2020-11-03 RX ORDER — CEFAZOLIN SODIUM 2 G/100ML
2 INJECTION, SOLUTION INTRAVENOUS ONCE
Status: CANCELLED | OUTPATIENT
Start: 2021-01-15 | End: 2020-11-03

## 2020-11-03 NOTE — PROGRESS NOTES
GYN Visit    11/3/2020    Patient: Mehnaz Denney          MR#:[id number]      Chief Complaint   Patient presents with   • Gynecologic Exam     Post menopausal bleeding (spotting)       History of Present Illness    60 y.o. female  who presents for bleeding, light spotting  Biopsy 5 months ago neg, known fibroids and hyst recommended, pt wanted to wait  She is now ready and will schedule in January  Lining has thickened and I rec repeat biopsy         No LMP recorded (lmp unknown). Patient is premenopausal.    ________________________________________  Patient Active Problem List   Diagnosis   • Iron deficiency anemia due to chronic blood loss   • Essential hypertension   • Uterine fibroid   • Hypercholesterolemia       Past Medical History:   Diagnosis Date   • Uterine fibroid        Past Surgical History:   Procedure Laterality Date   •  SECTION      TWINS   • D&C WITH SUCTION     • D&C WITH SUCTION     • LAPAROSCOPIC TUBAL LIGATION         Social History     Tobacco Use   Smoking Status Never Smoker   Smokeless Tobacco Never Used       has a current medication list which includes the following prescription(s): ferrous sulfate, lisinopril-hydrochlorothiazide, and multiple vitamin.  ________________________________________    Current contraception: post menopausal status      The following portions of the patient's history were reviewed and updated as appropriate: allergies, current medications, past family history, past medical history, past social history, past surgical history and problem list.    Review of Systems   Constitutional: Negative for chills, fatigue and fever.   Gastrointestinal: Negative.    Genitourinary: Positive for menstrual problem. Negative for pelvic pain.   Neurological: Negative for dizziness and light-headedness.   Psychiatric/Behavioral: Negative for dysphoric mood.   All other systems reviewed and are negative.      Pertinent items are noted in HPI.  "    Objective   Physical Exam    /64   Ht 180.3 cm (71\")   Wt 72.1 kg (159 lb)   LMP  (LMP Unknown)   Breastfeeding No   BMI 22.18 kg/m²    BP Readings from Last 3 Encounters:   11/03/20 115/64   09/10/20 104/60   05/20/20 118/70      Wt Readings from Last 3 Encounters:   11/03/20 72.1 kg (159 lb)   09/10/20 74 kg (163 lb 3.2 oz)   05/20/20 71.7 kg (158 lb)      BMI: Estimated body mass index is 22.18 kg/m² as calculated from the following:    Height as of this encounter: 180.3 cm (71\").    Weight as of this encounter: 72.1 kg (159 lb).    Lungs: non labored breathing, no wheezing or tachpnea  Extremities: extremities normal, atraumatic, no cyanosis or edema  Skin: Skin color, texture, turgor normal. No rashes or lesions  Neurologic: Grossly normal  General:   alert, appears stated age and cooperative   Abdomen: soft, non-tender, without masses or organomegaly       Vulva: normal   Vagina: normal mucosa   Cervix: no cervical motion tenderness   Uterus: size consistent with 18 weeks   Adnexa: no mass, fullness, tenderness     US report    Endometrial Biopsy Procedure Note    Pre-operative Diagnosis: PMB    Post-operative Diagnosis: same    Indications: postmenopausal bleeding    Procedure Details    Urine pregnancy test was not done.  The risks (including infection, bleeding, pain, and uterine perforation) and benefits of the procedure were explained to the patient and Verbal informed consent was obtained.  Antibiotic prophylaxis against endocarditis was not indicated.     The patient was placed in the dorsal lithotomy position.  Bimanual exam showed the uterus to be in the neutral position.  A Graves' speculum inserted in the vagina, and the cervix prepped with povidone iodine.  Endocervical curettage with a [facility] cure[facility] was not performed.     A sharp tenaculum was applied to the anterior lip of the cervix for stabilization.  A sterile uterine sound was used to sound the uterus to a depth of 9cm.  A " Pipelle endometrial aspirator was used to sample the endometrium.  Sample was sent for pathologic examination.    Condition:  Stable    Complications:  None  Patient tolerated the procedure well without complications.    Plan:    The patient was advised to call for any fever or for prolonged or severe pain or bleeding. She was advised to use OTC ibuprofen as needed for mild to moderate pain. She was advised to avoid vaginal intercourse for 48 hours or until the bleeding has completely stopped.    Attending Physician Documentation:  I was present for the entire procedure.   Assessment:      Diagnoses and all orders for this visit:    1. Postmenopausal bleeding (Primary)  -     Endometrial Biopsy  -     Reference Histopathology  -     Case Request; Standing  -     ceFAZolin (ANCEF) 2 g in sodium chloride 0.9 % 100 mL IVPB  -     Case Request    2. Intramural leiomyoma of uterus  -     Case Request; Standing  -     ceFAZolin (ANCEF) 2 g in sodium chloride 0.9 % 100 mL IVPB  -     Case Request    Other orders  -     Follow Anesthesia Guidelines / Protocol; Future  -     Chlorhexidine Skin Prep; Future  -     Follow Anesthesia Guidelines / Protocol; Standing  -     Obtain Informed Consent; Standing  -     Verify / Perform Chlorhexidine Skin Prep; Standing        Endo biopsy done, schedule surgery, will use vertical incision pt already has, uterus is quite large

## 2020-11-09 RX ORDER — FERROUS SULFATE 325(65) MG
325 TABLET ORAL
Qty: 90 TABLET | Refills: 1 | Status: SHIPPED | OUTPATIENT
Start: 2020-11-09 | End: 2020-11-09 | Stop reason: SDUPTHER

## 2020-11-09 RX ORDER — FERROUS SULFATE 325(65) MG
325 TABLET ORAL
Qty: 90 TABLET | Refills: 1 | Status: SHIPPED | OUTPATIENT
Start: 2020-11-09 | End: 2022-01-06 | Stop reason: SDUPTHER

## 2020-11-09 RX ORDER — FERROUS SULFATE 325(65) MG
TABLET ORAL
Qty: 90 TABLET | Refills: 0 | Status: SHIPPED | OUTPATIENT
Start: 2020-11-09 | End: 2021-01-13

## 2020-12-23 ENCOUNTER — TELEPHONE (OUTPATIENT)
Dept: OBSTETRICS AND GYNECOLOGY | Age: 60
End: 2020-12-23

## 2020-12-23 NOTE — TELEPHONE ENCOUNTER
Patient called in regards to her [facility] paper work, stated that it needed to be submitted before the 29th.     I seen the [facility] paperwork in the chart, however the patient stated that [facility] hasn't received it. Please advise.

## 2021-01-13 ENCOUNTER — APPOINTMENT (OUTPATIENT)
Dept: PREADMISSION TESTING | Facility: HOSPITAL | Age: 61
End: 2021-01-13

## 2021-01-13 VITALS
RESPIRATION RATE: 16 BRPM | WEIGHT: 161.1 LBS | TEMPERATURE: 97.9 F | OXYGEN SATURATION: 95 % | HEART RATE: 95 BPM | HEIGHT: 70 IN | BODY MASS INDEX: 23.06 KG/M2

## 2021-01-13 LAB
ANION GAP SERPL CALCULATED.3IONS-SCNC: 7.2 MMOL/L (ref 5–15)
BUN SERPL-MCNC: 15 MG/DL (ref 6–20)
BUN/CREAT SERPL: 20 (ref 7–25)
CALCIUM SPEC-SCNC: 10.7 MG/DL (ref 8.6–10.5)
CHLORIDE SERPL-SCNC: 104 MMOL/L (ref 98–107)
CO2 SERPL-SCNC: 29.8 MMOL/L (ref 22–29)
CREAT SERPL-MCNC: 0.75 MG/DL (ref 0.57–1)
DEPRECATED RDW RBC AUTO: 37.1 FL (ref 37–54)
ERYTHROCYTE [DISTWIDTH] IN BLOOD BY AUTOMATED COUNT: 14 % (ref 12.3–15.4)
GFR SERPL CREATININE-BSD FRML MDRD: 96 ML/MIN/1.73
GLUCOSE SERPL-MCNC: 93 MG/DL (ref 65–99)
HCT VFR BLD AUTO: 38.1 % (ref 34–46.6)
HGB BLD-MCNC: 12.2 G/DL (ref 12–15.9)
MCH RBC QN AUTO: 24 PG (ref 26.6–33)
MCHC RBC AUTO-ENTMCNC: 32 G/DL (ref 31.5–35.7)
MCV RBC AUTO: 74.9 FL (ref 79–97)
PLATELET # BLD AUTO: 207 10*3/MM3 (ref 140–450)
PMV BLD AUTO: 10.3 FL (ref 6–12)
POTASSIUM SERPL-SCNC: 3.5 MMOL/L (ref 3.5–5.2)
QT INTERVAL: 378 MS
RBC # BLD AUTO: 5.09 10*6/MM3 (ref 3.77–5.28)
SODIUM SERPL-SCNC: 141 MMOL/L (ref 136–145)
WBC # BLD AUTO: 3.17 10*3/MM3 (ref 3.4–10.8)

## 2021-01-13 PROCEDURE — U0004 COV-19 TEST NON-CDC HGH THRU: HCPCS | Performed by: NURSE PRACTITIONER

## 2021-01-13 PROCEDURE — 80048 BASIC METABOLIC PNL TOTAL CA: CPT

## 2021-01-13 PROCEDURE — C9803 HOPD COVID-19 SPEC COLLECT: HCPCS | Performed by: NURSE PRACTITIONER

## 2021-01-13 PROCEDURE — 93005 ELECTROCARDIOGRAM TRACING: CPT

## 2021-01-13 PROCEDURE — 36415 COLL VENOUS BLD VENIPUNCTURE: CPT

## 2021-01-13 PROCEDURE — 93010 ELECTROCARDIOGRAM REPORT: CPT | Performed by: INTERNAL MEDICINE

## 2021-01-13 PROCEDURE — 85027 COMPLETE CBC AUTOMATED: CPT

## 2021-01-13 NOTE — DISCHARGE INSTRUCTIONS
Take the following medications the morning of surgery: NONE      If you are on prescription narcotic pain medication to control your pain you may also take that medication the morning of surgery.    General Instructions:  • Do not eat solid food after midnight the night before surgery.  • You may drink clear liquids day of surgery but must stop at least one hour before your hospital arrival time.  • It is beneficial for you to have a clear drink that contains carbohydrates the day of surgery.  We suggest a 12 to 20 ounce bottle of Gatorade or Powerade for non-diabetic patients or a 12 to 20 ounce bottle of G2 or Powerade Zero for diabetic patients.     Clear liquids are liquids you can see through.  Nothing red in color.     Plain water                               Sports drinks  Sodas                                   Gelatin (Jell-O)  Fruit juices without pulp such as white grape juice and apple juice  Popsicles that contain no fruit or yogurt  Tea or coffee (no cream or milk added)  Gatorade / Powerade  G2 / Powerade Zero       • Patients who avoid smoking, chewing tobacco and alcohol for 4 weeks prior to surgery have a reduced risk of post-operative complications.  Quit smoking as many days before surgery as you can.  • Do not smoke, use chewing tobacco or drink alcohol the day of surgery.   • If applicable bring your C-PAP/ BI-PAP machine.  • Bring any papers given to you in the doctor’s office.  • Wear clean comfortable clothes.  • Do not wear contact lenses, false eyelashes or make-up.  Bring a case for your glasses.   • Remove all piercings.  Leave jewelry and any other valuables at home.  • Hair extensions with metal clips must be removed prior to surgery.  • The Pre-Admission Testing nurse will instruct you to bring medications if unable to obtain an accurate list in Pre-Admission Testing.            Preventing a Surgical Site Infection:  • For 2 to 3 days before surgery, avoid shaving with a razor  because the razor can irritate skin and make it easier to develop an infection.    • Any areas of open skin can increase the risk of a post-operative wound infection by allowing bacteria to enter and travel throughout the body.  Notify your surgeon if you have any skin wounds / rashes even if it is not near the expected surgical site.  The area will need assessed to determine if surgery should be delayed until it is healed.  • The night prior to surgery shower using a fresh bar of anti-bacterial soap (such as Dial) and clean washcloth.  Sleep in a clean bed with clean clothing.  Do not allow pets to sleep with you.  • Shower on the morning of surgery using a fresh bar of anti-bacterial soap (such as Dial) and clean washcloth.  Dry with a clean towel and dress in clean clothing.  • Ask your surgeon if you will be receiving antibiotics prior to surgery.  • Make sure you, your family, and all healthcare providers clean their hands with soap and water or an alcohol based hand  before caring for you or your wound.    Day of surgery: 1/15/2021. MAIN OR. ARRIVAL TIME 530 AM  Your arrival time is approximately two hours before your scheduled surgery time.  Upon arrival, a Pre-op nurse and Anesthesiologist will review your health history, obtain vital signs, and answer questions you may have.  The only belongings needed at this time will be a list of your home medications and if applicable your C-PAP/BI-PAP machine.  A Pre-op nurse will start an IV and you may receive medication in preparation for surgery, including something to help you relax.     Please be aware that surgery does come with discomfort.  We want to make every effort to control your discomfort so please discuss any uncontrolled symptoms with your nurse.   Your doctor will most likely have prescribed pain medications.          If you are staying overnight following surgery, you will be transported to your hospital room following the recovery period.   [facility] has all private rooms.    If you have any questions please call Pre-Admission Testing at [phone].  Deductibles and co-payments are collected on the day of service. Please be prepared to pay the required co-pay, deductible or deposit on the day of service as defined by your plan.    CHLORHEXIDINE CLOTH INSTRUCTIONS  The morning of surgery follow these instructions using the Chlorhexidine cloths you've been given.  These steps reduce bacteria on the body.  Do not use the cloths near your eyes, ears mouth, genitalia or on open wounds.  Throw the cloths away after use but do not try to flush them down a toilet.      • Open and remove one cloth at a time from the package.    • Leave the cloth unfolded and begin the bathing.  • Massage the skin with the cloths using gentle pressure to remove bacteria.  Do not scrub harshly.   • Follow the steps below with one 2% CHG cloth per area (6 total cloths).  • One cloth for neck, shoulders and chest.  • One cloth for both arms, hands, fingers and underarms (do underarms last).  • One cloth for the abdomen followed by groin.  • One cloth for right leg and foot including between the toes.  • One cloth for left leg and foot including between the toes.  • The last cloth is to be used for the back of the neck, back and buttocks.    Allow the CHG to air dry 3 minutes on the skin which will give it time to work and decrease the chance of irritation.  The skin may feel sticky until it is dry.  Do not rinse with water or any other liquid or you will lose the beneficial effects of the CHG.  If mild skin irritation occurs, do rinse the skin to remove the CHG.  Report this to the nurse at time of admission.  Do not apply lotions, creams, ointments, deodorants or perfumes after using the clothes. Dress in clean clothes before coming to the hospital.    Patient Education for Self-Quarantine Process    Following your COVID testing, we strongly recommend that you  do not leave your home after you have been tested for COVID except to get medical care. This includes not going to work, school or to public areas.  If this is not possible for you to do please limit your activities to only required outings.  Be sure to wear a mask when you are with other people, practice social distancing and wash your hands frequently.      The following items provide additional details to keep you safe.  • Wash your hands with soap and water frequently for at least 20 seconds.   • Avoid touching your eyes, nose and mouth with unwashed hands.  • Do not share anything - utensils, towels, food from the same bowl.   • Have your own utensils, drinking glass, dishes, towels and bedding.   • Do not have visitors.   • Do use FaceTime to stay in touch with family and friends.  • You should stay in a specific room away from others if possible.   • Stay at least 6 feet away from others in the home if you cannot have a dedicated room to yourself.   • Do not snuggle with your pet. While the CDC says there is no evidence that pets can spread COVID-19 or be infected from humans, it is probably best to avoid “petting, snuggling, being kissed or licked and sharing food (during self-quarantine)”, according to the CDC.   • Sanitize household surfaces daily. Include all high touch areas (door handles, light switches, phones, countertops, etc.)  • Do not share a bathroom with others, if possible.   • Wear a mask around others in your home if you are unable to stay in a separate room or 6 feet apart. If  you are unable to wear a mask, have your family member wear a mask if they must be within 6 feet of you.   Call your surgeon immediately if you experience any of the following symptoms:  • Sore Throat  • Shortness of Breath or difficulty breathing  • Cough  • Chills  • Body soreness or muscle pain  • Headache  • Fever  • New loss of taste or smell  • Do not arrive for your surgery ill.  Your procedure will need to be  rescheduled to another time.  You will need to call your physician before the day of surgery to avoid any unnecessary exposure to hospital staff as well as other patients.

## 2021-01-14 ENCOUNTER — TELEPHONE (OUTPATIENT)
Dept: OBSTETRICS AND GYNECOLOGY | Age: 61
End: 2021-01-14

## 2021-01-14 DIAGNOSIS — I10 ESSENTIAL HYPERTENSION: ICD-10-CM

## 2021-01-14 DIAGNOSIS — Z01.818 PREOPERATIVE CLEARANCE: ICD-10-CM

## 2021-01-14 DIAGNOSIS — R94.31 ABNORMAL EKG: Primary | ICD-10-CM

## 2021-01-14 LAB — SARS-COV-2 ORF1AB RESP QL NAA+PROBE: NOT DETECTED

## 2021-01-14 NOTE — TELEPHONE ENCOUNTER
Spoke with pt   EKG is abnormal  No chest pain,   Pt does have palpitations at times  Recommend cardiology clearance and reschedule surgery

## 2021-01-20 ENCOUNTER — OFFICE VISIT (OUTPATIENT)
Dept: CARDIOLOGY | Facility: CLINIC | Age: 61
End: 2021-01-20

## 2021-01-20 ENCOUNTER — HOSPITAL ENCOUNTER (OUTPATIENT)
Dept: CARDIOLOGY | Facility: HOSPITAL | Age: 61
Discharge: HOME | End: 2021-01-20
Admitting: INTERNAL MEDICINE

## 2021-01-20 VITALS
DIASTOLIC BLOOD PRESSURE: 70 MMHG | WEIGHT: 160 LBS | BODY MASS INDEX: 22.9 KG/M2 | HEIGHT: 70 IN | SYSTOLIC BLOOD PRESSURE: 126 MMHG | HEART RATE: 119 BPM

## 2021-01-20 DIAGNOSIS — I10 ESSENTIAL HYPERTENSION: Primary | ICD-10-CM

## 2021-01-20 DIAGNOSIS — I44.4 LAFB (LEFT ANTERIOR FASCICULAR BLOCK): ICD-10-CM

## 2021-01-20 DIAGNOSIS — I45.10 INCOMPLETE RBBB: ICD-10-CM

## 2021-01-20 DIAGNOSIS — E78.00 HYPERCHOLESTEROLEMIA: ICD-10-CM

## 2021-01-20 DIAGNOSIS — I10 ESSENTIAL HYPERTENSION: ICD-10-CM

## 2021-01-20 PROCEDURE — 99204 OFFICE O/P NEW MOD 45 MIN: CPT | Performed by: INTERNAL MEDICINE

## 2021-01-20 PROCEDURE — 93306 TTE W/DOPPLER COMPLETE: CPT

## 2021-01-20 PROCEDURE — 93306 TTE W/DOPPLER COMPLETE: CPT | Performed by: INTERNAL MEDICINE

## 2021-01-20 PROCEDURE — 93000 ELECTROCARDIOGRAM COMPLETE: CPT | Performed by: INTERNAL MEDICINE

## 2021-01-20 RX ORDER — AMLODIPINE BESYLATE 2.5 MG/1
2.5 TABLET ORAL DAILY
Qty: 90 TABLET | Refills: 3 | Status: SHIPPED | OUTPATIENT
Start: 2021-01-20 | End: 2021-07-29

## 2021-01-20 NOTE — PROGRESS NOTES
Date of Office Visit: 2021  Encounter Provider: Xena Yee MD  Place of Service: [facility] CARDIOLOGY  Patient Name: Mehnaz Denney  :1960      Patient ID:  Mehnaz Denney is a 60 y.o. female is here for an abnormal ECG and hypertension.          History of Present Illness    She has a history of hypertension, hyperlipidemia, history of anemia for which she takes oral iron.  She was referred here for an abnormal ECG.    Labs on 2021 show normal BMP and CBC.  Lipids on 2020 showed total cholesterol 227, HDL 61, , triglycerides 77.  She had normal TSH done 2020.    She is single, has 2 children, works as a , does not smoke or use alcohol, has 2 to 3 cups of coffee per day.  Her mother and father both had hypertension.    She is in need of hysterectomy for leiomyomas and dysmenorrhea.  She is on iron for iron deficiency anemia due to the dysmenorrhea.  She had the surgery scheduled for  but had a preoperative ECG which was abnormal.  Her ECG today reflects the same abnormality which is an incomplete right bundle branch block and left intravesicular block.  I pulled up an ECG from 2020 and it was also showing the same abnormalities.  She was exercising before Covid and felt well.  She had no exertional chest tightness or pressure.  She has no orthopnea or PND.  She has no exertional dyspnea.  Her heart races if she has caffeine or if she is nervous.  She also feels like lisinopril hydrochlorothiazide is really caused her heart to race more.  She did notice this as much prior to that medication.  She has never been diagnosed with diabetes, rheumatic fever, strokes, blood clots.  She tries to stay very active and pursues healthy eating.    Past Medical History:   Diagnosis Date   • History of anemia    • Hyperlipidemia    • Hypertension    • PONV (postoperative nausea and vomiting)    • Post-menopause bleeding     • Uterine fibroid          Past Surgical History:   Procedure Laterality Date   •  SECTION      TWINS   • D&C WITH SUCTION     • D&C WITH SUCTION     • LAPAROSCOPIC TUBAL LIGATION         Current Outpatient Medications on File Prior to Visit   Medication Sig Dispense Refill   • ferrous sulfate 325 (65 FE) MG tablet Take 1 tablet by mouth Daily With Breakfast. 90 tablet 1   • Multiple Vitamin (MULTI-VITAMIN DAILY PO) Take  by mouth Daily.     • [DISCONTINUED] lisinopril-hydrochlorothiazide (Zestoretic) 10-12.5 MG per tablet Take 1 tablet by mouth Daily. 30 tablet 5   • [DISCONTINUED] CHLORHEXIDINE GLUCONATE CLOTH EX Apply  topically. AS DURECTED       No current facility-administered medications on file prior to visit.        Social History     Socioeconomic History   • Marital status: Single     Spouse name: Not on file   • Number of children: Not on file   • Years of education: Not on file   • Highest education level: Not on file   Occupational History     Employer: [employer]   Tobacco Use   • Smoking status: Never Smoker   • Smokeless tobacco: Never Used   Substance and Sexual Activity   • Alcohol use: Yes     Alcohol/week: 1.0 standard drinks     Types: 1 Glasses of wine per week     Frequency: Never     Comment: Occ//Caffeine use: 2-3 cups daily   • Drug use: Never   • Sexual activity: Defer     Partners: Male     Birth control/protection: Tubal ligation           ROS    Procedures    ECG 12 Lead    Date/Time: 2021 8:19 AM  Performed by: Xena Yee MD  Authorized by: Xena Yee MD   Comparison: compared with previous ECG   Similar to previous ECG  Rhythm: sinus rhythm  Conduction: incomplete right bundle branch block and left anterior fascicular block    Clinical impression: abnormal EKG                Objective:      Vitals:    21 0811 21 0814   BP: 130/70 126/70   BP Location: Left arm Right arm   Pulse: 119    Weight: 72.6 kg (160 lb)  "   Height: 177.8 cm (70\")      Body mass index is 22.96 kg/m².    Vitals signs reviewed.   Constitutional:       General: Not in acute distress.     Appearance: Well-developed. Not diaphoretic.   Eyes:      General: No scleral icterus.     Conjunctiva/sclera: Conjunctivae normal.   HENT:      Head: Normocephalic and atraumatic.   Neck:      Musculoskeletal: Neck supple.      Thyroid: No thyromegaly.      Vascular: No carotid bruit or JVD.      Lymphadenopathy: No cervical adenopathy.   Pulmonary:      Effort: Pulmonary effort is normal. No respiratory distress.      Breath sounds: Normal breath sounds. No wheezing. No rhonchi. No rales.   Chest:      Chest wall: Not tender to palpatation.   Cardiovascular:      Tachycardia present. Regular rhythm.      Murmurs: There is no murmur.      No gallop.   Pulses:     Intact distal pulses.   Edema:     Peripheral edema absent.   Abdominal:      General: Bowel sounds are normal. There is no distension or abdominal bruit.      Palpations: Abdomen is soft. There is no abdominal mass.      Tenderness: There is no abdominal tenderness.   Musculoskeletal:         General: No deformity.      Extremities: No clubbing present.  Skin:     General: Skin is warm and dry. There is no cyanosis.      Coloration: Skin is not pale.      Findings: No rash.   Neurological:      Mental Status: Alert and oriented to person, place, and time.      Cranial Nerves: No cranial nerve deficit.   Psychiatric:         Judgment: Judgment normal.         Lab Review:       Assessment:      Diagnosis Plan   1. Essential hypertension  Adult Transthoracic Echo Complete W/ Cont if Necessary Per Protocol   2. Hypercholesterolemia     3. LAFB (left anterior fascicular block)  Adult Transthoracic Echo Complete W/ Cont if Necessary Per Protocol   4. Incomplete RBBB  Adult Transthoracic Echo Complete W/ Cont if Necessary Per Protocol     1. Abnormal EKG showing left anterior fascicular block and incomplete right " bundle branch block.  She has no symptoms of decompensated heart failure or unstable angina.  We will set up an echocardiogram to be done in order to clear her for surgery.  2. Preoperative evaluation for hysterectomy indicated for leiomyomas and dysmenorrhea.  3. Hypertension, goal less than 120/80.  She is not tolerating her lisinopril hydrochlorothiazide well.  We will stop this in favor of amlodipine 2.5 mg daily.  4. Hyperlipidemia, untreated as her ratio is good.  5. Iron deficiency anemia.     Plan:       We will have her follow-up with Karyn Denny in 6 months to reevaluate her blood pressure.  Await echocardiographic results.  Overall normal physical examination.  If the echo is normal, no further work-up is needed at this time and she would be cleared for surgery.    Addendum: Echocardiogram done 1/20/2021 she is ejection fraction 67% with mild asymmetric septal hypertrophy without left ventricular outflow tract obstruction, mild anterior mitral leaflet thickening without stenosis or insufficiency, grade 1 diastolic dysfunction.  She is a low cardiovascular risk to undergo surgery and may proceed without further testing.

## 2021-01-21 LAB
AORTIC ARCH: 2.5 CM
ASCENDING AORTA: 2.5 CM
BH CV ECHO MEAS - ACS: 2.3 CM
BH CV ECHO MEAS - AO MAX PG (FULL): 5.3 MMHG
BH CV ECHO MEAS - AO MAX PG: 12.7 MMHG
BH CV ECHO MEAS - AO MEAN PG (FULL): 3 MMHG
BH CV ECHO MEAS - AO MEAN PG: 6 MMHG
BH CV ECHO MEAS - AO ROOT AREA (BSA CORRECTED): 1.8
BH CV ECHO MEAS - AO ROOT AREA: 9.1 CM^2
BH CV ECHO MEAS - AO ROOT DIAM: 3.4 CM
BH CV ECHO MEAS - AO V2 MAX: 178 CM/SEC
BH CV ECHO MEAS - AO V2 MEAN: 116 CM/SEC
BH CV ECHO MEAS - AO V2 VTI: 27.9 CM
BH CV ECHO MEAS - ASC AORTA: 2.5 CM
BH CV ECHO MEAS - AVA(I,A): 2.3 CM^2
BH CV ECHO MEAS - AVA(I,D): 2.3 CM^2
BH CV ECHO MEAS - AVA(V,A): 2.4 CM^2
BH CV ECHO MEAS - AVA(V,D): 2.4 CM^2
BH CV ECHO MEAS - BSA(HAYCOCK): 1.9 M^2
BH CV ECHO MEAS - BSA: 1.9 M^2
BH CV ECHO MEAS - BZI_BMI: 23 KILOGRAMS/M^2
BH CV ECHO MEAS - BZI_METRIC_HEIGHT: 177.8 CM
BH CV ECHO MEAS - BZI_METRIC_WEIGHT: 72.6 KG
BH CV ECHO MEAS - EDV(MOD-SP2): 60 ML
BH CV ECHO MEAS - EDV(MOD-SP4): 63 ML
BH CV ECHO MEAS - EDV(TEICH): 70 ML
BH CV ECHO MEAS - EF(CUBED): 61.9 %
BH CV ECHO MEAS - EF(MOD-BP): 66.9 %
BH CV ECHO MEAS - EF(MOD-SP2): 63.3 %
BH CV ECHO MEAS - EF(MOD-SP4): 71.4 %
BH CV ECHO MEAS - EF(TEICH): 54 %
BH CV ECHO MEAS - ESV(MOD-SP2): 22 ML
BH CV ECHO MEAS - ESV(MOD-SP4): 18 ML
BH CV ECHO MEAS - ESV(TEICH): 32.2 ML
BH CV ECHO MEAS - FS: 27.5 %
BH CV ECHO MEAS - IVS/LVPW: 1.4
BH CV ECHO MEAS - IVSD: 1.3 CM
BH CV ECHO MEAS - LAT PEAK E' VEL: 10.8 CM/SEC
BH CV ECHO MEAS - LV DIASTOLIC VOL/BSA (35-75): 33.2 ML/M^2
BH CV ECHO MEAS - LV MASS(C)D: 145.6 GRAMS
BH CV ECHO MEAS - LV MASS(C)DI: 76.7 GRAMS/M^2
BH CV ECHO MEAS - LV MAX PG: 7.4 MMHG
BH CV ECHO MEAS - LV MEAN PG: 3 MMHG
BH CV ECHO MEAS - LV SYSTOLIC VOL/BSA (12-30): 9.5 ML/M^2
BH CV ECHO MEAS - LV V1 MAX: 136 CM/SEC
BH CV ECHO MEAS - LV V1 MEAN: 75.2 CM/SEC
BH CV ECHO MEAS - LV V1 VTI: 20.2 CM
BH CV ECHO MEAS - LVIDD: 4 CM
BH CV ECHO MEAS - LVIDS: 2.9 CM
BH CV ECHO MEAS - LVLD AP2: 7.6 CM
BH CV ECHO MEAS - LVLD AP4: 7.2 CM
BH CV ECHO MEAS - LVLS AP2: 6 CM
BH CV ECHO MEAS - LVLS AP4: 5.4 CM
BH CV ECHO MEAS - LVOT AREA (M): 3.1 CM^2
BH CV ECHO MEAS - LVOT AREA: 3.1 CM^2
BH CV ECHO MEAS - LVOT DIAM: 2 CM
BH CV ECHO MEAS - LVPWD: 0.9 CM
BH CV ECHO MEAS - MED PEAK E' VEL: 5.3 CM/SEC
BH CV ECHO MEAS - MV A MAX VEL: 75.8 CM/SEC
BH CV ECHO MEAS - MV DEC SLOPE: 255.6 CM/SEC^2
BH CV ECHO MEAS - MV DEC TIME: 0.19 SEC
BH CV ECHO MEAS - MV E MAX VEL: 51.8 CM/SEC
BH CV ECHO MEAS - MV E/A: 0.68
BH CV ECHO MEAS - MV MAX PG: 3 MMHG
BH CV ECHO MEAS - MV MEAN PG: 2 MMHG
BH CV ECHO MEAS - MV P1/2T MAX VEL: 33.9 CM/SEC
BH CV ECHO MEAS - MV P1/2T: 38.8 MSEC
BH CV ECHO MEAS - MV V2 MAX: 86.2 CM/SEC
BH CV ECHO MEAS - MV V2 MEAN: 63.8 CM/SEC
BH CV ECHO MEAS - MV V2 VTI: 16.4 CM
BH CV ECHO MEAS - MVA P1/2T LCG: 6.5 CM^2
BH CV ECHO MEAS - MVA(P1/2T): 5.7 CM^2
BH CV ECHO MEAS - MVA(VTI): 3.9 CM^2
BH CV ECHO MEAS - PA ACC TIME: 0.09 SEC
BH CV ECHO MEAS - PA MAX PG (FULL): 4 MMHG
BH CV ECHO MEAS - PA MAX PG: 6.2 MMHG
BH CV ECHO MEAS - PA PR(ACCEL): 40.8 MMHG
BH CV ECHO MEAS - PA V2 MAX: 124 CM/SEC
BH CV ECHO MEAS - PVA(V,A): 1.7 CM^2
BH CV ECHO MEAS - PVA(V,D): 1.7 CM^2
BH CV ECHO MEAS - QP/QS: 0.51
BH CV ECHO MEAS - RAP SYSTOLE: 8 MMHG
BH CV ECHO MEAS - RV MAX PG: 2.1 MMHG
BH CV ECHO MEAS - RV MEAN PG: 1 MMHG
BH CV ECHO MEAS - RV V1 MAX: 73.1 CM/SEC
BH CV ECHO MEAS - RV V1 MEAN: 46.7 CM/SEC
BH CV ECHO MEAS - RV V1 VTI: 11.4 CM
BH CV ECHO MEAS - RVOT AREA: 2.8 CM^2
BH CV ECHO MEAS - RVOT DIAM: 1.9 CM
BH CV ECHO MEAS - RVSP: 24 MMHG
BH CV ECHO MEAS - SI(AO): 133.4 ML/M^2
BH CV ECHO MEAS - SI(CUBED): 20.9 ML/M^2
BH CV ECHO MEAS - SI(LVOT): 33.4 ML/M^2
BH CV ECHO MEAS - SI(MOD-SP2): 20 ML/M^2
BH CV ECHO MEAS - SI(MOD-SP4): 23.7 ML/M^2
BH CV ECHO MEAS - SI(TEICH): 19.9 ML/M^2
BH CV ECHO MEAS - SUP REN AO DIAM: 2.4 CM
BH CV ECHO MEAS - SV(AO): 253.3 ML
BH CV ECHO MEAS - SV(CUBED): 39.6 ML
BH CV ECHO MEAS - SV(LVOT): 63.5 ML
BH CV ECHO MEAS - SV(MOD-SP2): 38 ML
BH CV ECHO MEAS - SV(MOD-SP4): 45 ML
BH CV ECHO MEAS - SV(RVOT): 32.3 ML
BH CV ECHO MEAS - SV(TEICH): 37.8 ML
BH CV ECHO MEAS - TAPSE (>1.6): 1.2 CM
BH CV ECHO MEAS - TR MAX VEL: 198 CM/SEC
BH CV ECHO MEASUREMENTS AVERAGE E/E' RATIO: 6.43
BH CV XLRA - RV BASE: 2.3 CM
BH CV XLRA - RV LENGTH: 4.4 CM
BH CV XLRA - RV MID: 1.4 CM
BH CV XLRA - TDI S': 19.4 CM/SEC
LEFT ATRIUM VOLUME INDEX: 21 ML/M2
MAXIMAL PREDICTED HEART RATE: 161 BPM
SINUS: 2.8 CM
STJ: 2.6 CM
STRESS TARGET HR: 137 BPM

## 2021-01-29 DIAGNOSIS — D25.1 INTRAMURAL LEIOMYOMA OF UTERUS: Primary | ICD-10-CM

## 2021-01-29 DIAGNOSIS — N95.0 PMB (POSTMENOPAUSAL BLEEDING): ICD-10-CM

## 2021-01-29 RX ORDER — SODIUM CHLORIDE 0.9 % (FLUSH) 0.9 %
3 SYRINGE (ML) INJECTION EVERY 12 HOURS SCHEDULED
Status: CANCELLED | OUTPATIENT
Start: 2021-04-02

## 2021-01-29 RX ORDER — CEFAZOLIN SODIUM 2 G/100ML
2 INJECTION, SOLUTION INTRAVENOUS ONCE
Status: CANCELLED | OUTPATIENT
Start: 2021-04-02 | End: 2021-01-29

## 2021-01-29 RX ORDER — SODIUM CHLORIDE 0.9 % (FLUSH) 0.9 %
10 SYRINGE (ML) INJECTION AS NEEDED
Status: CANCELLED | OUTPATIENT
Start: 2021-04-02

## 2021-02-01 ENCOUNTER — TELEMEDICINE (OUTPATIENT)
Dept: FAMILY MEDICINE CLINIC | Facility: TELEHEALTH | Age: 61
End: 2021-02-01

## 2021-02-01 ENCOUNTER — E-VISIT (OUTPATIENT)
Dept: FAMILY MEDICINE CLINIC | Facility: TELEHEALTH | Age: 61
End: 2021-02-01

## 2021-02-01 DIAGNOSIS — H00.11 CHALAZION OF RIGHT UPPER EYELID: Primary | ICD-10-CM

## 2021-02-01 PROCEDURE — 99213 OFFICE O/P EST LOW 20 MIN: CPT | Performed by: NURSE PRACTITIONER

## 2021-02-01 RX ORDER — POLYMYXIN B SULFATE AND TRIMETHOPRIM 1; 10000 MG/ML; [USP'U]/ML
1 SOLUTION OPHTHALMIC EVERY 4 HOURS
Qty: 1 EACH | Refills: 0 | Status: SHIPPED | OUTPATIENT
Start: 2021-02-01 | End: 2021-02-08

## 2021-02-01 NOTE — PROGRESS NOTES
I counseled the patient to follow up with Patient Mistake.    Pt was seen via Video visit instead.

## 2021-02-01 NOTE — PROGRESS NOTES
Subjective   Chief Complaint   Patient presents with   • Eyelid Problem       Mehnaz Denney is a 60 y.o. female.     Pt reports right eyelid bump x 2 weeks. Initially there was some itching which has now resolved. Denies visual change, pain, redness, drainage.     Eye Problem   The right eye is affected. This is a new problem. Episode onset: 2 weeks. The problem occurs constantly. The problem has been unchanged. There was no injury mechanism. The pain is at a severity of 0/10. The patient is experiencing no pain. There is no known exposure to pink eye. Associated symptoms include itching (resolved). Pertinent negatives include no blurred vision, eye discharge, double vision, eye redness, fever, foreign body sensation, nausea, photophobia, recent URI or vomiting. Treatments tried: warm compress occasionally.        No Known Allergies    Past Medical History:   Diagnosis Date   • History of anemia    • Hyperlipidemia    • Hypertension    • PONV (postoperative nausea and vomiting)    • Post-menopause bleeding    • Uterine fibroid        Past Surgical History:   Procedure Laterality Date   •  SECTION      TWINS   • D&C WITH SUCTION     • D&C WITH SUCTION     • LAPAROSCOPIC TUBAL LIGATION         Social History     Socioeconomic History   • Marital status: Single     Spouse name: Not on file   • Number of children: Not on file   • Years of education: Not on file   • Highest education level: Not on file   Occupational History     Employer: [employer]   Tobacco Use   • Smoking status: Never Smoker   • Smokeless tobacco: Never Used   Substance and Sexual Activity   • Alcohol use: Yes     Alcohol/week: 1.0 standard drinks     Types: 1 Glasses of wine per week     Frequency: Never     Comment: Occ//Caffeine use: 2-3 cups daily   • Drug use: Never   • Sexual activity: Defer     Partners: Male     Birth control/protection: Tubal ligation       Family History   Adopted: Yes   Problem  Relation Age of Onset   • Hypertension Mother    • Hypertension Father    • No Known Problems Daughter    • No Known Problems Daughter    • Malig Hyperthermia Neg Hx          Current Outpatient Medications:   •  amLODIPine (NORVASC) 2.5 MG tablet, Take 1 tablet by mouth Daily., Disp: 90 tablet, Rfl: 3  •  ferrous sulfate 325 (65 FE) MG tablet, Take 1 tablet by mouth Daily With Breakfast., Disp: 90 tablet, Rfl: 1  •  Multiple Vitamin (MULTI-VITAMIN DAILY PO), Take  by mouth Daily., Disp: , Rfl:   •  trimethoprim-polymyxin b (Polytrim) 37160-3.1 UNIT/ML-% ophthalmic solution, Administer 1 drop to the right eye Every 4 (Four) Hours for 7 days., Disp: 1 each, Rfl: 0      Review of Systems   Constitutional: Negative for chills, diaphoresis, fatigue and fever.   HENT: Negative.    Eyes: Positive for itching (resolved). Negative for blurred vision, double vision, photophobia, pain, discharge, redness and visual disturbance.   Gastrointestinal: Negative for nausea and vomiting.   Neurological: Negative for headache.        There were no vitals filed for this visit.    Objective   Physical Exam  Constitutional:       General: She is not in acute distress.     Appearance: Normal appearance. She is not ill-appearing, toxic-appearing or diaphoretic.   HENT:      Head: Normocephalic and atraumatic.   Eyes:      General:         Right eye: No foreign body, discharge or hordeolum.         Left eye: No foreign body, discharge or hordeolum.      Extraocular Movements: Extraocular movements intact.      Conjunctiva/sclera: Conjunctivae normal.     Pulmonary:      Effort: Pulmonary effort is normal.   Neurological:      Mental Status: She is alert and oriented to person, place, and time.   Psychiatric:         Mood and Affect: Mood normal.         Speech: Speech normal.         Behavior: Behavior normal.          Procedures     Assessment/Plan   Diagnoses and all orders for this visit:    1. Chalazion of right upper eyelid  (Primary)    Other orders  -     trimethoprim-polymyxin b (Polytrim) 23734-1.1 UNIT/ML-% ophthalmic solution; Administer 1 drop to the right eye Every 4 (Four) Hours for 7 days.  Dispense: 1 each; Refill: 0      Continue warm compress frequently.   Give this time to resolve as it can take several weeks.   If symptoms worsen or do not improve follow up with your PCP or Eye Doctor.         PLAN: Pt would like to try an antibiotic. Advised chalazions are not treated with antibiotic and will probably not improve symptoms or speed up recovery but will prescribe per pt request. Discussed dosing, side effects, recommended other symptomatic care.  Patient should follow up with primary care provider if symptoms worsen, fail to resolve or other symptoms need attention. Patient/family agree to the above.     I spent 20 minutes caring for Mehnaz on this date of service. This time includes time spent by me in the following activities:preparing for the visit, obtaining and/or reviewing a separately obtained history, performing a medically appropriate examination and/or evaluation , counseling and educating the patient/family/caregiver, ordering medications, tests, or procedures and documenting information in the medical record    JUAN MANUEL Hemphill

## 2021-02-01 NOTE — PATIENT INSTRUCTIONS
Continue warm compress frequently.   Give this time to resolve as it can take several weeks.   If symptoms worsen or do not improve follow up with your PCP or Eye Doctor.       Chalazion    A chalazion is a swelling or lump on the eyelid. It can affect the upper eyelid or the lower eyelid.  What are the causes?  This condition may be caused by:  · Long-lasting (chronic) inflammation of the eyelid glands.  · A blocked oil gland in the eyelid.  What are the signs or symptoms?  Symptoms of this condition include:  · Swelling of the eyelid. The swelling may spread to areas around the eye.  · A hard lump on the eyelid.  · Blurry vision. The lump on the eyelid may make it hard to see out of the eye.  How is this diagnosed?  This condition is diagnosed with an examination of the eye.  How is this treated?  This condition is treated by applying a warm compress to the eyelid. If the condition does not improve, it may be treated with:  · Medicine that is injected into the chalazion by a health care provider.  · Surgery.  · Medicine that is applied to the eye.  Follow these instructions at home:  Managing pain and swelling  · Apply a warm, moist compress to the eyelid 4-6 times a day for 10-15 minutes at a time. This will help to open any blocked glands and to reduce redness and swelling.  · Apply over-the-counter and prescription medicines only as told by your health care provider.  General instructions  · Do not touch the chalazion.  · Do not try to remove the pus. Do not squeeze the chalazion or stick it with a pin or needle.  · Do not rub your eyes.  · Wash your hands often. Dry your hands with a clean towel.  · Keep your face, scalp, and eyebrows clean.  · Avoid wearing eye makeup.  · If the chalazion does not break open (rupture) on its own, return to your health care provider.  · Keep all follow-up appointments as told by your health care provider. This is important.  Contact a health care provider if:  · Your eyelid  has not improved in 4 weeks.  · Your eyelid is getting worse.  · You have a fever.  · The chalazion does not rupture on its own after a month of home treatment.  Get help right away if:  · You have pain in your eye.  · Your vision changes.  · The chalazion becomes painful or red.  · The chalazion gets bigger.  Summary  · A chalazion is a swelling or lump on the upper or lower eyelid.  · It may be caused by chronic inflammation or a blocked oil gland.  · Apply a warm, moist compress to the eyelid 4-6 times a day for 10-15 minutes at a time.  · Keep your face, scalp, and eyebrows clean.  This information is not intended to replace advice given to you by your health care provider. Make sure you discuss any questions you have with your health care provider.  Document Revised: 06/06/2019 Document Reviewed: 06/06/2019  Elsevier Patient Education © 2020 [employer]

## 2021-03-01 DIAGNOSIS — E78.00 HYPERCHOLESTEROLEMIA: ICD-10-CM

## 2021-03-01 DIAGNOSIS — I10 ESSENTIAL HYPERTENSION: Primary | ICD-10-CM

## 2021-03-04 ENCOUNTER — TELEPHONE (OUTPATIENT)
Dept: FAMILY MEDICINE CLINIC | Facility: CLINIC | Age: 61
End: 2021-03-04

## 2021-03-04 NOTE — TELEPHONE ENCOUNTER
Yes, pt would still need to get labs. Dr. Cantrell is requesting separate labs than what she had completed.

## 2021-03-04 NOTE — TELEPHONE ENCOUNTER
PT STATES SHE HAD LABS COMPLETED IN January AT [facility] FOR PRE-TESTING. PT WOULD LIKE TO KNOW IF SHE NEEDS TO HAVE THEM DONE AGAIN ON 3/11/2021.    CALL BACK [phone]

## 2021-03-12 LAB
ALBUMIN SERPL-MCNC: 4 G/DL (ref 3.5–5.2)
ALBUMIN/GLOB SERPL: 1.2 G/DL
ALP SERPL-CCNC: 95 U/L (ref 39–117)
ALT SERPL-CCNC: 20 U/L (ref 1–33)
AST SERPL-CCNC: 18 U/L (ref 1–32)
BILIRUB SERPL-MCNC: 0.3 MG/DL (ref 0–1.2)
BUN SERPL-MCNC: 12 MG/DL (ref 6–20)
BUN/CREAT SERPL: 17.6 (ref 7–25)
CALCIUM SERPL-MCNC: 10.7 MG/DL (ref 8.6–10.5)
CHLORIDE SERPL-SCNC: 103 MMOL/L (ref 98–107)
CHOLEST SERPL-MCNC: 206 MG/DL (ref 0–200)
CO2 SERPL-SCNC: 26.5 MMOL/L (ref 22–29)
CREAT SERPL-MCNC: 0.68 MG/DL (ref 0.57–1)
DIFFERENTIAL COMMENT: ABNORMAL
ERYTHROCYTE [DISTWIDTH] IN BLOOD BY AUTOMATED COUNT: 14 % (ref 12.3–15.4)
GLOBULIN SER CALC-MCNC: 3.3 GM/DL
GLUCOSE SERPL-MCNC: 112 MG/DL (ref 65–99)
HCT VFR BLD AUTO: 36.2 % (ref 34–46.6)
HDLC SERPL-MCNC: 55 MG/DL (ref 40–60)
HGB BLD-MCNC: 12.1 G/DL (ref 12–15.9)
IRON SATN MFR SERPL: 8 % (ref 20–50)
IRON SERPL-MCNC: 24 MCG/DL (ref 37–145)
LDLC SERPL CALC-MCNC: 140 MG/DL (ref 0–100)
LDLC/HDLC SERPL: 2.53 {RATIO}
LYMPHOCYTES # BLD MANUAL: 0.21 10*3/MM3 (ref 0.7–3.1)
LYMPHOCYTES NFR BLD MANUAL: 2.5 % (ref 19.6–45.3)
MCH RBC QN AUTO: 24.5 PG (ref 26.6–33)
MCHC RBC AUTO-ENTMCNC: 33.4 G/DL (ref 31.5–35.7)
MCV RBC AUTO: 73.3 FL (ref 79–97)
MONOCYTES # BLD MANUAL: 0.42 10*3/MM3 (ref 0.1–0.9)
MONOCYTES NFR BLD MANUAL: 5 % (ref 5–12)
NEUTROPHILS # BLD MANUAL: 7.72 10*3/MM3 (ref 1.7–7)
NEUTROPHILS NFR BLD MANUAL: 92.5 % (ref 42.7–76)
NRBC BLD AUTO-RTO: 0 /100 WBC (ref 0–0.2)
PLATELET # BLD AUTO: 259 10*3/MM3 (ref 140–450)
PLATELET BLD QL SMEAR: ABNORMAL
POTASSIUM SERPL-SCNC: 3.9 MMOL/L (ref 3.5–5.2)
PROT SERPL-MCNC: 7.3 G/DL (ref 6–8.5)
RBC # BLD AUTO: 4.94 10*6/MM3 (ref 3.77–5.28)
RBC MORPH BLD: ABNORMAL
SODIUM SERPL-SCNC: 140 MMOL/L (ref 136–145)
TIBC SERPL-MCNC: 302 MCG/DL
TRIGL SERPL-MCNC: 59 MG/DL (ref 0–150)
UIBC SERPL-MCNC: 278 MCG/DL (ref 112–346)
UNABLE TO VOID: NORMAL
VLDLC SERPL CALC-MCNC: 11 MG/DL (ref 5–40)
WBC # BLD AUTO: 8.35 10*3/MM3 (ref 3.4–10.8)

## 2021-03-18 ENCOUNTER — ANESTHESIA (OUTPATIENT)
Dept: PERIOP | Facility: HOSPITAL | Age: 61
End: 2021-03-18

## 2021-03-18 ENCOUNTER — HOSPITAL ENCOUNTER (INPATIENT)
Facility: HOSPITAL | Age: 61
LOS: 4 days | Discharge: HOME | End: 2021-03-22
Attending: EMERGENCY MEDICINE | Admitting: UROLOGY

## 2021-03-18 ENCOUNTER — APPOINTMENT (OUTPATIENT)
Dept: GENERAL RADIOLOGY | Facility: HOSPITAL | Age: 61
End: 2021-03-18

## 2021-03-18 ENCOUNTER — ANESTHESIA EVENT (OUTPATIENT)
Dept: PERIOP | Facility: HOSPITAL | Age: 61
End: 2021-03-18

## 2021-03-18 ENCOUNTER — APPOINTMENT (OUTPATIENT)
Dept: CT IMAGING | Facility: HOSPITAL | Age: 61
End: 2021-03-18

## 2021-03-18 DIAGNOSIS — E87.6 HYPOKALEMIA: ICD-10-CM

## 2021-03-18 DIAGNOSIS — N17.9 ACUTE RENAL FAILURE, UNSPECIFIED ACUTE RENAL FAILURE TYPE (HCC): ICD-10-CM

## 2021-03-18 DIAGNOSIS — N20.1 RIGHT URETERAL STONE: ICD-10-CM

## 2021-03-18 DIAGNOSIS — A41.9 SEPSIS, DUE TO UNSPECIFIED ORGANISM, UNSPECIFIED WHETHER ACUTE ORGAN DYSFUNCTION PRESENT (HCC): ICD-10-CM

## 2021-03-18 DIAGNOSIS — N20.0 RIGHT RENAL STONE: Primary | ICD-10-CM

## 2021-03-18 DIAGNOSIS — N13.2 HYDRONEPHROSIS WITH URINARY OBSTRUCTION DUE TO URETERAL CALCULUS: ICD-10-CM

## 2021-03-18 LAB
ALBUMIN SERPL-MCNC: 2.9 G/DL (ref 3.5–5.2)
ALBUMIN/GLOB SERPL: 0.8 G/DL
ALP SERPL-CCNC: 203 U/L (ref 39–117)
ALT SERPL W P-5'-P-CCNC: 29 U/L (ref 1–33)
ANION GAP SERPL CALCULATED.3IONS-SCNC: 16.5 MMOL/L (ref 5–15)
ANISOCYTOSIS BLD QL: ABNORMAL
AST SERPL-CCNC: 42 U/L (ref 1–32)
BACTERIA UR QL AUTO: ABNORMAL /HPF
BASOPHILS # BLD MANUAL: 0.13 10*3/MM3 (ref 0–0.2)
BASOPHILS NFR BLD AUTO: 1 % (ref 0–1.5)
BILIRUB SERPL-MCNC: 1.7 MG/DL (ref 0–1.2)
BILIRUB UR QL STRIP: NEGATIVE
BUN SERPL-MCNC: 38 MG/DL (ref 6–20)
BUN/CREAT SERPL: 18.1 (ref 7–25)
CALCIUM SPEC-SCNC: 9.9 MG/DL (ref 8.6–10.5)
CHLORIDE SERPL-SCNC: 99 MMOL/L (ref 98–107)
CLARITY UR: ABNORMAL
CO2 SERPL-SCNC: 20.5 MMOL/L (ref 22–29)
COLOR UR: ABNORMAL
CREAT SERPL-MCNC: 2.1 MG/DL (ref 0.57–1)
D-LACTATE SERPL-SCNC: 1 MMOL/L (ref 0.5–2)
D-LACTATE SERPL-SCNC: 4.3 MMOL/L (ref 0.5–2)
DEPRECATED RDW RBC AUTO: 37.1 FL (ref 37–54)
ERYTHROCYTE [DISTWIDTH] IN BLOOD BY AUTOMATED COUNT: 15.1 % (ref 12.3–15.4)
GFR SERPL CREATININE-BSD FRML MDRD: 29 ML/MIN/1.73
GLOBULIN UR ELPH-MCNC: 3.8 GM/DL
GLUCOSE BLDC GLUCOMTR-MCNC: 123 MG/DL (ref 70–130)
GLUCOSE SERPL-MCNC: 129 MG/DL (ref 65–99)
GLUCOSE UR STRIP-MCNC: NEGATIVE MG/DL
HCT VFR BLD AUTO: 39.1 % (ref 34–46.6)
HGB BLD-MCNC: 12.8 G/DL (ref 12–15.9)
HGB UR QL STRIP.AUTO: ABNORMAL
HYALINE CASTS UR QL AUTO: ABNORMAL /LPF
HYPOCHROMIA BLD QL: ABNORMAL
INR PPP: 1.11 (ref 0.9–1.1)
KETONES UR QL STRIP: NEGATIVE
LEUKOCYTE ESTERASE UR QL STRIP.AUTO: ABNORMAL
LIPASE SERPL-CCNC: 13 U/L (ref 13–60)
LYMPHOCYTES # BLD MANUAL: 0.54 10*3/MM3 (ref 0.7–3.1)
LYMPHOCYTES NFR BLD MANUAL: 3 % (ref 5–12)
LYMPHOCYTES NFR BLD MANUAL: 4 % (ref 19.6–45.3)
MAGNESIUM SERPL-MCNC: 1.8 MG/DL (ref 1.6–2.6)
MCH RBC QN AUTO: 23 PG (ref 26.6–33)
MCHC RBC AUTO-ENTMCNC: 32.7 G/DL (ref 31.5–35.7)
MCV RBC AUTO: 70.3 FL (ref 79–97)
MICROCYTES BLD QL: ABNORMAL
MONOCYTES # BLD AUTO: 0.4 10*3/MM3 (ref 0.1–0.9)
NEUTROPHILS # BLD AUTO: 12.32 10*3/MM3 (ref 1.7–7)
NEUTROPHILS NFR BLD MANUAL: 91.9 % (ref 42.7–76)
NITRITE UR QL STRIP: NEGATIVE
NT-PROBNP SERPL-MCNC: 3659 PG/ML (ref 0–900)
PH UR STRIP.AUTO: 5.5 [PH] (ref 5–8)
PLAT MORPH BLD: NORMAL
PLATELET # BLD AUTO: 80 10*3/MM3 (ref 140–450)
PMV BLD AUTO: ABNORMAL FL
POTASSIUM SERPL-SCNC: 2.7 MMOL/L (ref 3.5–5.2)
PROCALCITONIN SERPL-MCNC: 190.76 NG/ML (ref 0–0.25)
PROT SERPL-MCNC: 6.7 G/DL (ref 6–8.5)
PROT UR QL STRIP: ABNORMAL
PROTHROMBIN TIME: 14.1 SECONDS (ref 11.7–14.2)
RBC # BLD AUTO: 5.56 10*6/MM3 (ref 3.77–5.28)
RBC # UR: ABNORMAL /HPF
REF LAB TEST METHOD: ABNORMAL
SARS-COV-2 RNA RESP QL NAA+PROBE: NOT DETECTED
SODIUM SERPL-SCNC: 136 MMOL/L (ref 136–145)
SP GR UR STRIP: 1.01 (ref 1–1.03)
SQUAMOUS #/AREA URNS HPF: ABNORMAL /HPF
T4 FREE SERPL-MCNC: 1.47 NG/DL (ref 0.93–1.7)
TROPONIN T SERPL-MCNC: 0.36 NG/ML (ref 0–0.03)
TSH SERPL DL<=0.05 MIU/L-ACNC: 6.38 UIU/ML (ref 0.27–4.2)
UROBILINOGEN UR QL STRIP: ABNORMAL
WBC # BLD AUTO: 13.41 10*3/MM3 (ref 3.4–10.8)
WBC MORPH BLD: NORMAL
WBC UR QL AUTO: ABNORMAL /HPF

## 2021-03-18 PROCEDURE — 93010 ELECTROCARDIOGRAM REPORT: CPT | Performed by: INTERNAL MEDICINE

## 2021-03-18 PROCEDURE — 25010000002 PROPOFOL 10 MG/ML EMULSION: Performed by: NURSE ANESTHETIST, CERTIFIED REGISTERED

## 2021-03-18 PROCEDURE — G0378 HOSPITAL OBSERVATION PER HR: HCPCS

## 2021-03-18 PROCEDURE — 87186 SC STD MICRODIL/AGAR DIL: CPT | Performed by: NURSE PRACTITIONER

## 2021-03-18 PROCEDURE — C1769 GUIDE WIRE: HCPCS | Performed by: UROLOGY

## 2021-03-18 PROCEDURE — 84443 ASSAY THYROID STIM HORMONE: CPT | Performed by: NURSE PRACTITIONER

## 2021-03-18 PROCEDURE — 25010000002 SUCCINYLCHOLINE PER 20 MG: Performed by: NURSE ANESTHETIST, CERTIFIED REGISTERED

## 2021-03-18 PROCEDURE — 87040 BLOOD CULTURE FOR BACTERIA: CPT | Performed by: NURSE PRACTITIONER

## 2021-03-18 PROCEDURE — 51798 US URINE CAPACITY MEASURE: CPT

## 2021-03-18 PROCEDURE — 87086 URINE CULTURE/COLONY COUNT: CPT | Performed by: UROLOGY

## 2021-03-18 PROCEDURE — C2617 STENT, NON-COR, TEM W/O DEL: HCPCS | Performed by: UROLOGY

## 2021-03-18 PROCEDURE — 74420 UROGRAPHY RTRGR +-KUB: CPT

## 2021-03-18 PROCEDURE — 99285 EMERGENCY DEPT VISIT HI MDM: CPT

## 2021-03-18 PROCEDURE — 85610 PROTHROMBIN TIME: CPT | Performed by: NURSE PRACTITIONER

## 2021-03-18 PROCEDURE — 83735 ASSAY OF MAGNESIUM: CPT | Performed by: NURSE PRACTITIONER

## 2021-03-18 PROCEDURE — 25010000002 PIPERACILLIN SOD-TAZOBACTAM PER 1 G: Performed by: NURSE PRACTITIONER

## 2021-03-18 PROCEDURE — 84145 PROCALCITONIN (PCT): CPT | Performed by: NURSE PRACTITIONER

## 2021-03-18 PROCEDURE — 81001 URINALYSIS AUTO W/SCOPE: CPT | Performed by: NURSE PRACTITIONER

## 2021-03-18 PROCEDURE — 74176 CT ABD & PELVIS W/O CONTRAST: CPT

## 2021-03-18 PROCEDURE — C1758 CATHETER, URETERAL: HCPCS | Performed by: UROLOGY

## 2021-03-18 PROCEDURE — 87186 SC STD MICRODIL/AGAR DIL: CPT | Performed by: UROLOGY

## 2021-03-18 PROCEDURE — 0 IOTHALAMATE 60 % SOLUTION: Performed by: UROLOGY

## 2021-03-18 PROCEDURE — 85007 BL SMEAR W/DIFF WBC COUNT: CPT | Performed by: NURSE PRACTITIONER

## 2021-03-18 PROCEDURE — 82962 GLUCOSE BLOOD TEST: CPT

## 2021-03-18 PROCEDURE — 25010000002 MIDAZOLAM PER 1 MG: Performed by: ANESTHESIOLOGY

## 2021-03-18 PROCEDURE — 83880 ASSAY OF NATRIURETIC PEPTIDE: CPT | Performed by: NURSE PRACTITIONER

## 2021-03-18 PROCEDURE — 93005 ELECTROCARDIOGRAM TRACING: CPT | Performed by: NURSE PRACTITIONER

## 2021-03-18 PROCEDURE — 25010000002 DEXAMETHASONE PER 1 MG: Performed by: NURSE ANESTHETIST, CERTIFIED REGISTERED

## 2021-03-18 PROCEDURE — U0003 INFECTIOUS AGENT DETECTION BY NUCLEIC ACID (DNA OR RNA); SEVERE ACUTE RESPIRATORY SYNDROME CORONAVIRUS 2 (SARS-COV-2) (CORONAVIRUS DISEASE [COVID-19]), AMPLIFIED PROBE TECHNIQUE, MAKING USE OF HIGH THROUGHPUT TECHNOLOGIES AS DESCRIBED BY CMS-2020-01-R: HCPCS | Performed by: NURSE PRACTITIONER

## 2021-03-18 PROCEDURE — 71045 X-RAY EXAM CHEST 1 VIEW: CPT

## 2021-03-18 PROCEDURE — 83690 ASSAY OF LIPASE: CPT | Performed by: NURSE PRACTITIONER

## 2021-03-18 PROCEDURE — 25010000002 VANCOMYCIN 10 G RECONSTITUTED SOLUTION: Performed by: NURSE PRACTITIONER

## 2021-03-18 PROCEDURE — 80053 COMPREHEN METABOLIC PANEL: CPT | Performed by: NURSE PRACTITIONER

## 2021-03-18 PROCEDURE — 87088 URINE BACTERIA CULTURE: CPT | Performed by: UROLOGY

## 2021-03-18 PROCEDURE — 83605 ASSAY OF LACTIC ACID: CPT | Performed by: NURSE PRACTITIONER

## 2021-03-18 PROCEDURE — 84484 ASSAY OF TROPONIN QUANT: CPT | Performed by: NURSE PRACTITIONER

## 2021-03-18 PROCEDURE — 85025 COMPLETE CBC W/AUTO DIFF WBC: CPT | Performed by: NURSE PRACTITIONER

## 2021-03-18 PROCEDURE — 84439 ASSAY OF FREE THYROXINE: CPT | Performed by: NURSE PRACTITIONER

## 2021-03-18 PROCEDURE — 25010000003 POTASSIUM CHLORIDE 10 MEQ/100ML SOLUTION: Performed by: INTERNAL MEDICINE

## 2021-03-18 RX ORDER — MIDAZOLAM HYDROCHLORIDE 1 MG/ML
1 INJECTION INTRAMUSCULAR; INTRAVENOUS
Status: DISCONTINUED | OUTPATIENT
Start: 2021-03-18 | End: 2021-03-19 | Stop reason: HOSPADM

## 2021-03-18 RX ORDER — SODIUM CHLORIDE 0.9 % (FLUSH) 0.9 %
10 SYRINGE (ML) INJECTION AS NEEDED
Status: DISCONTINUED | OUTPATIENT
Start: 2021-03-18 | End: 2021-03-19 | Stop reason: HOSPADM

## 2021-03-18 RX ORDER — SUCCINYLCHOLINE CHLORIDE 20 MG/ML
INJECTION INTRAMUSCULAR; INTRAVENOUS AS NEEDED
Status: DISCONTINUED | OUTPATIENT
Start: 2021-03-18 | End: 2021-03-19 | Stop reason: SURG

## 2021-03-18 RX ORDER — MAGNESIUM HYDROXIDE 1200 MG/15ML
LIQUID ORAL AS NEEDED
Status: DISCONTINUED | OUTPATIENT
Start: 2021-03-18 | End: 2021-03-19 | Stop reason: HOSPADM

## 2021-03-18 RX ORDER — ROCURONIUM BROMIDE 10 MG/ML
INJECTION, SOLUTION INTRAVENOUS AS NEEDED
Status: DISCONTINUED | OUTPATIENT
Start: 2021-03-18 | End: 2021-03-19 | Stop reason: SURG

## 2021-03-18 RX ORDER — SODIUM CHLORIDE 0.9 % (FLUSH) 0.9 %
10 SYRINGE (ML) INJECTION EVERY 12 HOURS SCHEDULED
Status: DISCONTINUED | OUTPATIENT
Start: 2021-03-18 | End: 2021-03-19 | Stop reason: HOSPADM

## 2021-03-18 RX ORDER — POTASSIUM CHLORIDE 7.45 MG/ML
10 INJECTION INTRAVENOUS ONCE
Status: COMPLETED | OUTPATIENT
Start: 2021-03-18 | End: 2021-03-18

## 2021-03-18 RX ORDER — POTASSIUM CHLORIDE 7.45 MG/ML
10 INJECTION INTRAVENOUS ONCE
Status: COMPLETED | OUTPATIENT
Start: 2021-03-19 | End: 2021-03-19

## 2021-03-18 RX ORDER — SODIUM CHLORIDE 0.9 % (FLUSH) 0.9 %
10 SYRINGE (ML) INJECTION AS NEEDED
Status: DISCONTINUED | OUTPATIENT
Start: 2021-03-18 | End: 2021-03-22 | Stop reason: HOSPADM

## 2021-03-18 RX ORDER — MIDAZOLAM HYDROCHLORIDE 1 MG/ML
2 INJECTION INTRAMUSCULAR; INTRAVENOUS
Status: DISCONTINUED | OUTPATIENT
Start: 2021-03-18 | End: 2021-03-19 | Stop reason: HOSPADM

## 2021-03-18 RX ORDER — PROPOFOL 10 MG/ML
VIAL (ML) INTRAVENOUS AS NEEDED
Status: DISCONTINUED | OUTPATIENT
Start: 2021-03-18 | End: 2021-03-19 | Stop reason: SURG

## 2021-03-18 RX ORDER — SODIUM CHLORIDE, SODIUM LACTATE, POTASSIUM CHLORIDE, CALCIUM CHLORIDE 600; 310; 30; 20 MG/100ML; MG/100ML; MG/100ML; MG/100ML
9 INJECTION, SOLUTION INTRAVENOUS CONTINUOUS PRN
Status: DISCONTINUED | OUTPATIENT
Start: 2021-03-18 | End: 2021-03-22 | Stop reason: HOSPADM

## 2021-03-18 RX ORDER — POTASSIUM CHLORIDE 750 MG/1
40 TABLET, FILM COATED, EXTENDED RELEASE ORAL ONCE
Status: COMPLETED | OUTPATIENT
Start: 2021-03-18 | End: 2021-03-18

## 2021-03-18 RX ORDER — LIDOCAINE HYDROCHLORIDE 10 MG/ML
0.5 INJECTION, SOLUTION EPIDURAL; INFILTRATION; INTRACAUDAL; PERINEURAL ONCE AS NEEDED
Status: DISCONTINUED | OUTPATIENT
Start: 2021-03-18 | End: 2021-03-19 | Stop reason: HOSPADM

## 2021-03-18 RX ORDER — POTASSIUM CHLORIDE 7.45 MG/ML
10 INJECTION INTRAVENOUS ONCE
Status: COMPLETED | OUTPATIENT
Start: 2021-03-18 | End: 2021-03-19

## 2021-03-18 RX ADMIN — PROPOFOL 200 MG: 10 INJECTION, EMULSION INTRAVENOUS at 23:44

## 2021-03-18 RX ADMIN — SODIUM CHLORIDE 1000 ML: 9 INJECTION, SOLUTION INTRAVENOUS at 16:40

## 2021-03-18 RX ADMIN — MIDAZOLAM 1 MG: 1 INJECTION INTRAMUSCULAR; INTRAVENOUS at 23:33

## 2021-03-18 RX ADMIN — VANCOMYCIN HYDROCHLORIDE 1500 MG: 10 INJECTION, POWDER, LYOPHILIZED, FOR SOLUTION INTRAVENOUS at 20:04

## 2021-03-18 RX ADMIN — POTASSIUM CHLORIDE 40 MEQ: 750 TABLET, EXTENDED RELEASE ORAL at 18:47

## 2021-03-18 RX ADMIN — SODIUM CHLORIDE, POTASSIUM CHLORIDE, SODIUM LACTATE AND CALCIUM CHLORIDE: 600; 310; 30; 20 INJECTION, SOLUTION INTRAVENOUS at 23:41

## 2021-03-18 RX ADMIN — FENTANYL CITRATE 50 MCG: 50 INJECTION INTRAMUSCULAR; INTRAVENOUS at 23:44

## 2021-03-18 RX ADMIN — ROCURONIUM BROMIDE 10 MG: 50 INJECTION INTRAVENOUS at 23:56

## 2021-03-18 RX ADMIN — LIDOCAINE HYDROCHLORIDE 50 MG: 20 INJECTION, SOLUTION INFILTRATION; PERINEURAL at 23:44

## 2021-03-18 RX ADMIN — SUCCINYLCHOLINE CHLORIDE 80 MG: 20 INJECTION, SOLUTION INTRAMUSCULAR; INTRAVENOUS; PARENTERAL at 23:44

## 2021-03-18 RX ADMIN — POTASSIUM CHLORIDE 10 MEQ: 7.46 INJECTION, SOLUTION INTRAVENOUS at 22:33

## 2021-03-18 RX ADMIN — POTASSIUM CHLORIDE 10 MEQ: 7.46 INJECTION, SOLUTION INTRAVENOUS at 23:01

## 2021-03-18 RX ADMIN — ROCURONIUM BROMIDE 10 MG: 50 INJECTION INTRAVENOUS at 23:44

## 2021-03-18 RX ADMIN — DEXAMETHASONE SODIUM PHOSPHATE 8 MG: 10 INJECTION INTRAMUSCULAR; INTRAVENOUS at 23:50

## 2021-03-18 RX ADMIN — TAZOBACTAM SODIUM AND PIPERACILLIN SODIUM 3.38 G: 375; 3 INJECTION, SOLUTION INTRAVENOUS at 18:51

## 2021-03-18 NOTE — ED TRIAGE NOTES
Pt arrives via PV. Pt reports increasing fatigue that started 7 day ago worsening today. Pt reports being seen by urgent care that told her she was dehydrated and to go to er. Pt also complains of diarrhea x2  that started today.    Pt masked on arrival, staff masked

## 2021-03-18 NOTE — ED PROVIDER NOTES
EMERGENCY DEPARTMENT ENCOUNTER    Room Number:  P682/1  Date seen:  3/23/2021  Time seen: 16:27 EDT  PCP: Efrem Cantrell MD  Historian: Patient, daughter    HPI:  Chief complaint: Severe weakness, fatigue and diarrhea  A complete HPI/ROS/PMH/PSH/SH/FH are unobtainable due to: Not applicable  Context:Mehnaz Denney is a 60 y.o. female who presents to the ED with c/o feeling poorly since last Thursday including weakness and fatigue and today started with 3 episodes of moderate diarrhea.  Her symptoms are not improved or worsened by anything and she has not had this problem before.  She was seen in urgent care and sent here due to concern for dehydration and elevated heart rate.  She denies any chest pain, chest tightness or chest pressure, she has no shortness of breath.  She has had chills but no fever she denies any nausea or vomiting, no recent antibiotic use in the last 90 days and has no urinary symptoms or abdominal pain.  She has no history of abdominal surgeries.  She denies ill contacts and has not been around anyone with covid.  She states she is scheduled for hysterectomy the beginning of April due to fibroids but she also states that she is postmenopausal and has not had any vaginal bleeding.  She also denies any rectal bleeding or black stools.    Patient was placed in face mask in first look. Patient was wearing facemask when I entered the room and throughout our encounter. I wore full protective equipment throughout this patient encounter including a face mask, eye shield and gloves. Hand hygiene/washing of hands was performed before donning protective equipment and after removal when leaving the room.    MEDICAL RECORD REVIEW    ALLERGIES  Patient has no known allergies.    PAST MEDICAL HISTORY  Active Ambulatory Problems     Diagnosis Date Noted   • Iron deficiency anemia due to chronic blood loss 03/04/2020   • Essential hypertension 03/04/2020   • Uterine fibroid 05/20/2020   •  Hypercholesterolemia 2020   • Postmenopausal bleeding 2020   • Intramural leiomyoma of uterus 2020   • PMB (postmenopausal bleeding) 2021     Resolved Ambulatory Problems     Diagnosis Date Noted   • No Resolved Ambulatory Problems     Past Medical History:   Diagnosis Date   • History of anemia    • Hyperlipidemia    • Hypertension    • PONV (postoperative nausea and vomiting)    • Post-menopause bleeding        PAST SURGICAL HISTORY  Past Surgical History:   Procedure Laterality Date   •  SECTION      TWINS   • CYSTOSCOPY W/ URETERAL STENT PLACEMENT Right 3/18/2021    Procedure: CYSTOSCOPY, RIGHT  URETERAL STENT INSERTION, AND RUSH CATHETER PLACEMENT;  Surgeon: Remy Bowens MD;  Location: [facility] OR;  Service: Urology;  Laterality: Right;   • D & C WITH SUCTION     • D & C WITH SUCTION     • LAPAROSCOPIC TUBAL LIGATION         FAMILY HISTORY  Family History   Adopted: Yes   Problem Relation Age of Onset   • Hypertension Mother    • Hypertension Father    • No Known Problems Daughter    • No Known Problems Daughter    • Malig Hyperthermia Neg Hx        SOCIAL HISTORY  Social History     Socioeconomic History   • Marital status: Single     Spouse name: Not on file   • Number of children: Not on file   • Years of education: Not on file   • Highest education level: Not on file   Tobacco Use   • Smoking status: Never Smoker   • Smokeless tobacco: Never Used   Substance and Sexual Activity   • Alcohol use: Yes     Alcohol/week: 1.0 standard drinks     Types: 1 Glasses of wine per week     Comment: Occ//Caffeine use: 2-3 cups daily   • Drug use: Defer   • Sexual activity: Defer     Partners: Male     Birth control/protection: Tubal ligation       REVIEW OF SYSTEMS  Review of Systems    All systems reviewed and negative except for those discussed in HPI.     PHYSICAL EXAM    ED Triage Vitals   Temp Heart Rate Resp BP SpO2   21 1616 21 1614 21 1614  03/18/21 1617 03/18/21 1614   97.8 °F (36.6 °C) (!) 146 20 (!) 76/50 100 %      Temp src Heart Rate Source Patient Position BP Location FiO2 (%)   03/18/21 1616 03/18/21 1614 -- -- --   Tympanic Monitor        Physical Exam    I have reviewed the triage vital signs and nursing notes.      GENERAL: Appears unwell  HENT: nares patent, mm dry  EYES: no scleral icterus  NECK: no ROM limitations  CV: regular rhythm, tachycardia, no murmur, no rubs, no gallups  RESPIRATORY: normal effort, CTAB  ABDOMEN: soft, mild generalized tenderness, NO guarding, NO rebound, BS  normal  : deferred  MUSCULOSKELETAL: no deformity  NEURO: alert, moves all extremities, follows commands  SKIN: warm, dry    Critical Care  Performed by: Joanne Lantigua APRN  Authorized by: Capo Soto MD     Critical care provider statement:     Critical care time (minutes):  40    Critical care was necessary to treat or prevent imminent or life-threatening deterioration of the following conditions:  Cardiac failure, dehydration, metabolic crisis, renal failure and sepsis    Critical care was time spent personally by me on the following activities:  Blood draw for specimens, development of treatment plan with patient or surrogate, discussions with consultants, evaluation of patient's response to treatment, examination of patient, obtaining history from patient or surrogate, ordering and performing treatments and interventions, ordering and review of laboratory studies, ordering and review of radiographic studies, pulse oximetry, re-evaluation of patient's condition and review of old charts          LAB RESULTS  Recent Results (from the past 24 hour(s))   Basic Metabolic Panel    Collection Time: 03/22/21  6:37 AM    Specimen: Arm, Right; Blood   Result Value Ref Range    Glucose 78 65 - 99 mg/dL    BUN 6 6 - 20 mg/dL    Creatinine 0.67 0.57 - 1.00 mg/dL    Sodium 140 136 - 145 mmol/L    Potassium 3.9 3.5 - 5.2 mmol/L    Chloride 108 (H) 98 - 107  mmol/L    CO2 27.1 22.0 - 29.0 mmol/L    Calcium 9.4 8.6 - 10.5 mg/dL    eGFR  African Amer 109 >60 mL/min/1.73    BUN/Creatinine Ratio 9.0 7.0 - 25.0    Anion Gap 4.9 (L) 5.0 - 15.0 mmol/L   CBC (No Diff)    Collection Time: 03/22/21  6:37 AM    Specimen: Blood   Result Value Ref Range    WBC 7.96 3.40 - 10.80 10*3/mm3    RBC 4.32 3.77 - 5.28 10*6/mm3    Hemoglobin 10.0 (L) 12.0 - 15.9 g/dL    Hematocrit 31.1 (L) 34.0 - 46.6 %    MCV 72.0 (L) 79.0 - 97.0 fL    MCH 23.1 (L) 26.6 - 33.0 pg    MCHC 32.2 31.5 - 35.7 g/dL    RDW 14.8 12.3 - 15.4 %    RDW-SD 38.2 37.0 - 54.0 fl    MPV 11.1 6.0 - 12.0 fL    Platelets 205 140 - 450 10*3/mm3   Magnesium    Collection Time: 03/22/21  6:37 AM    Specimen: Arm, Right; Blood   Result Value Ref Range    Magnesium 2.0 1.6 - 2.6 mg/dL   Phosphorus    Collection Time: 03/22/21  6:37 AM    Specimen: Arm, Right; Blood   Result Value Ref Range    Phosphorus 2.9 2.5 - 4.5 mg/dL         RADIOLOGY RESULTS  XR Pyelogram Retrograde   Final Result         Electronically signed by Elan Sawant MD on 03-19-21 at 0037      CT Abdomen Pelvis Without Contrast   Final Result      XR Chest 1 View   Final Result   No acute process.       This report was finalized on 3/18/2021 5:19 PM by Dr. Edwardo Mera M.D.                PROGRESS, DATA ANALYSIS, CONSULTS AND MEDICAL DECISION MAKING  All labs have been independently reviewed by me.  All radiology studies have been reviewed by me and discussed with radiologist dictating the report.  EKG's independently viewed and interpreted by me unless stated otherwise. Discussion below represents my analysis of pertinent findings related to patient's condition, differential diagnosis, treatment plan and final disposition.     ED Course as of Mar 23 0020   Thu Mar 18, 2021   1721 HR is decreasing and BP improving after NS bolus has started.     [EW]   1743 Potassium(!): 2.7 [EW]   1743 Creatinine(!): 2.10 [EW]   1743 BUN(!): 38 [EW]   1743 WBC(!):  13.41 [EW]   1743 Lactate(!!): 4.3 [EW]   1747 Platelets(!): 80 [EW]   1800 Patient has no new medications in the past week.  She had normal renal function 7 days ago.  Despite elevated troponin this is thought to be related to strain from the kidneys and the tachycardia.  The patient has no complaints at all of chest pain or shortness of breath.  She is already stating that she starts to feel better after the fluids.  Dr. Soto and I have both discussed admission with her and CT scan has been ordered.    [EW]   1805 Patient did state that she had some back pain on the right side several days ago.  We will check a bladder scan    [EW]   1816 EKG          EKG time: 1718  Rhythm/Rate: Sinus tachycardia, rate 118  P waves and RI: Normal P, normal RI  QRS, axis: Narrow QRS, leftward axis  ST and T waves: Diffuse rounded ST changes    Interpreted Contemporaneously by me, independently viewed  New ST changes compared to prior EKG dated 1/13/2021      [TR]   1901 Discussed CT scan with Dr. Mcfadden, radiologist.  Patient has a 17 x 12 mm proximal calculus in the right ureter causing moderate hydronephrosis.  This definitely explains the patient's septic picture.  Antibiotics have been started and I have put a call out to urology.  I have updated patient as well as her 2 daughters I made her n.p.o. and will get her admitted to the hospitalist.    [EW]   1908 I phoned lab to discuss changing the covid order to [facility] from [facility] due to possible urgent surgery.     [EW]   1911 MDM/dispo: This patient is clearly septic from a 17 x 12 mm right-sided proximal ureter stone.  She has elevated lactic acid, elevated WBC and was tachycardic and hypotensive.  I have started Zosyn and vancomycin as well as consulted urology for possible stent placement this evening.  Patient's heart rate and blood pressure have started to improve after IV fluids given.    [EW]   1931 Discussed admission to ICU with Dr. Castillo who agrees to accept.  I  "am waiting on call from Urology.     [EW]   1953 Discussed patient with Dr. Bowens, urology.  He will call the operating room to plan to take patient for stenting this evening.    [EW]      ED Course User Index  [EW] Joanne Lantigua APRN  [TR] Capo Soto MD     DDX: sepsis, covid, urosepsis, NSTEMI,     MdM:  As above in ED course     Reviewed pt's history and workup with Dr. Soto.  After a bedside evaluation, Dr. Soto agrees with the plan of care.    Based on the patient's lab findings and presenting symptoms, the doctor and I feel it is appropriate to admit the patient for further management, evaluation, and treatment.  I have discussed this with the admitting team.  I have also discussed this with the patient/family.  They are in agreement with admission.          Disposition vitals:  /85 (BP Location: Right arm, Patient Position: Lying)   Pulse 86   Temp 97.5 °F (36.4 °C) (Oral)   Resp 18   Ht 175.3 cm (69\")   Wt 73.5 kg (162 lb 0.6 oz)   LMP  (LMP Unknown)   SpO2 99%   BMI 23.93 kg/m²       DIAGNOSIS  Final diagnoses:   Hypokalemia   Sepsis, due to unspecified organism, unspecified whether acute organ dysfunction present (CMS/HCC)   Acute renal failure, unspecified acute renal failure type (CMS/HCC)   Right renal stone   Hydronephrosis with urinary obstruction due to ureteral calculus     Admission   to take patient to Operating Room this evening for stent and then she will go to ICU.      Joanne Lantigua APRN  03/18/21 2019       Joanne Lantigua APRN  03/23/21 0019       Joanne Latnigua APRN  03/23/21 0020    "

## 2021-03-18 NOTE — ED PROVIDER NOTES
MD ATTESTATION NOTE    The MARIA ESTHER and I have discussed this patient's history, physical exam, and treatment plan.  I have reviewed the documentation and personally had a face to face interaction with the patient. I affirm the documentation and agree with the treatment and plan.  The attached note describes my personal findings.      Mehnaz Denney is a 60 y.o. female who presents to the ED c/o generalized weakness, diarrhea.  She reports that she did start developing fatigue 7 days ago.  She reports he got worse today.  Today she developed diarrhea that was severe.  She went to the urgent care center and they told her to come to the emergency room.  She denies abdominal pain.  She reports nausea.  She denies sick contacts.  She denies similar prior episodes.  She has not taken any medicine for her symptoms.  Upon arrival here she was found to be hypotensive and tachycardic.  She reports she has been eating and drinking well.      On exam:  GENERAL: Awake, alert, no acute distress, ill-appearing  SKIN: Warm, dry  HENT: Normocephalic, atraumatic  EYES: no scleral icterus  CV: regular rhythm, tachycardic  RESPIRATORY: normal effort, lungs clear  ABDOMEN: soft, non-tender, non-distended  MUSCULOSKELETAL: no deformity  NEURO: alert, moves all extremities, follows commands    Labs  Recent Results (from the past 24 hour(s))   Comprehensive Metabolic Panel    Collection Time: 03/18/21  4:58 PM    Specimen: Blood   Result Value Ref Range    Glucose 129 (H) 65 - 99 mg/dL    BUN 38 (H) 6 - 20 mg/dL    Creatinine 2.10 (H) 0.57 - 1.00 mg/dL    Sodium 136 136 - 145 mmol/L    Potassium 2.7 (L) 3.5 - 5.2 mmol/L    Chloride 99 98 - 107 mmol/L    CO2 20.5 (L) 22.0 - 29.0 mmol/L    Calcium 9.9 8.6 - 10.5 mg/dL    Total Protein 6.7 6.0 - 8.5 g/dL    Albumin 2.90 (L) 3.50 - 5.20 g/dL    ALT (SGPT) 29 1 - 33 U/L    AST (SGOT) 42 (H) 1 - 32 U/L    Alkaline Phosphatase 203 (H) 39 - 117 U/L    Total Bilirubin 1.7 (H) 0.0 - 1.2 mg/dL    eGFR    Amer 29 (L) >60 mL/min/1.73    Globulin 3.8 gm/dL    A/G Ratio 0.8 g/dL    BUN/Creatinine Ratio 18.1 7.0 - 25.0    Anion Gap 16.5 (H) 5.0 - 15.0 mmol/L   Protime-INR    Collection Time: 03/18/21  4:58 PM    Specimen: Blood   Result Value Ref Range    Protime 14.1 11.7 - 14.2 Seconds    INR 1.11 (H) 0.90 - 1.10   Lipase    Collection Time: 03/18/21  4:58 PM    Specimen: Blood   Result Value Ref Range    Lipase 13 13 - 60 U/L   BNP    Collection Time: 03/18/21  4:58 PM    Specimen: Blood   Result Value Ref Range    proBNP 3,659.0 (H) 0.0 - 900.0 pg/mL   Troponin    Collection Time: 03/18/21  4:58 PM    Specimen: Blood   Result Value Ref Range    Troponin T 0.360 (C) 0.000 - 0.030 ng/mL   Procalcitonin    Collection Time: 03/18/21  4:58 PM    Specimen: Blood   Result Value Ref Range    Procalcitonin 190.76 (H) 0.00 - 0.25 ng/mL   TSH    Collection Time: 03/18/21  4:58 PM    Specimen: Blood   Result Value Ref Range    TSH 6.380 (H) 0.270 - 4.200 uIU/mL   T4, Free    Collection Time: 03/18/21  4:58 PM    Specimen: Blood   Result Value Ref Range    Free T4 1.47 0.93 - 1.70 ng/dL   Magnesium    Collection Time: 03/18/21  4:58 PM    Specimen: Blood   Result Value Ref Range    Magnesium 1.8 1.6 - 2.6 mg/dL   CBC Auto Differential    Collection Time: 03/18/21  4:59 PM    Specimen: Blood   Result Value Ref Range    WBC 13.41 (H) 3.40 - 10.80 10*3/mm3    RBC 5.56 (H) 3.77 - 5.28 10*6/mm3    Hemoglobin 12.8 12.0 - 15.9 g/dL    Hematocrit 39.1 34.0 - 46.6 %    MCV 70.3 (L) 79.0 - 97.0 fL    MCH 23.0 (L) 26.6 - 33.0 pg    MCHC 32.7 31.5 - 35.7 g/dL    RDW 15.1 12.3 - 15.4 %    RDW-SD 37.1 37.0 - 54.0 fl    MPV      Platelets 80 (L) 140 - 450 10*3/mm3   Manual Differential    Collection Time: 03/18/21  4:59 PM    Specimen: Blood   Result Value Ref Range    Neutrophil % 91.9 (H) 42.7 - 76.0 %    Lymphocyte % 4.0 (L) 19.6 - 45.3 %    Monocyte % 3.0 (L) 5.0 - 12.0 %    Basophil % 1.0 0.0 - 1.5 %    Neutrophils Absolute 12.32  (H) 1.70 - 7.00 10*3/mm3    Lymphocytes Absolute 0.54 (L) 0.70 - 3.10 10*3/mm3    Monocytes Absolute 0.40 0.10 - 0.90 10*3/mm3    Basophils Absolute 0.13 0.00 - 0.20 10*3/mm3    Anisocytosis Slight/1+ None Seen    Hypochromia Slight/1+ None Seen    Microcytes Slight/1+ None Seen    WBC Morphology Normal Normal    Platelet Morphology Normal Normal   Lactic Acid, Plasma    Collection Time: 03/18/21  5:11 PM    Specimen: Blood   Result Value Ref Range    Lactate 4.3 (C) 0.5 - 2.0 mmol/L   ECG 12 Lead    Collection Time: 03/18/21  5:18 PM   Result Value Ref Range    QT Interval 295 ms       Radiology  XR Chest 1 View    Result Date: 3/18/2021  XR CHEST 1 VW-  03/18/2021  HISTORY: Shortness of breath.  Heart size is within normal limits. Lungs appear free of acute infiltrates. Bones and soft tissues are unremarkable.      No acute process.  This report was finalized on 3/18/2021 5:19 PM by Dr. Edwardo Mera M.D.        Medical Decision Making:  ED Course as of Mar 18 1854   Thu Mar 18, 2021   1721 HR is decreasing and BP improving after NS bolus has started.     [EW]   1743 Potassium(!): 2.7 [EW]   1743 Creatinine(!): 2.10 [EW]   1743 BUN(!): 38 [EW]   1743 WBC(!): 13.41 [EW]   1743 Lactate(!!): 4.3 [EW]   1747 Platelets(!): 80 [EW]   1800 Patient has no new medications in the past week.  She had normal renal function 7 days ago.  Despite elevated troponin this is thought to be related to strain from the kidneys and the tachycardia.  The patient has no complaints at all of chest pain or shortness of breath.  She is already stating that she starts to feel better after the fluids.  Dr. Soto and I have both discussed admission with her and CT scan has been ordered.    [EW]   1805 Patient did state that she had some back pain on the right side several days ago.  We will check a bladder scan    [EW]   1816 EKG          EKG time: 1718  Rhythm/Rate: Sinus tachycardia, rate 118  P waves and NJ: Normal P, normal NJ  QRS,  axis: Narrow QRS, leftward axis  ST and T waves: Diffuse rounded ST changes    Interpreted Contemporaneously by me, independently viewed  New ST changes compared to prior EKG dated 1/13/2021      [TR]      ED Course User Index  [EW] Grzegorz Joanne JUAN MANUEL Soni  [TR] Capo Soto MD       Plan sepsis work-up including lactic acid, chemistries, CBC, imaging of her abdomen and pelvis given her diarrhea and abnormal vital signs.  Plan IV fluids, antibiotics.    SEPTIC SHOCK FOCUSED EXAM ATTESTATION    I attest that I have reassessed tissue perfusion after the fluid bolus given.    Capo Soto MD  03/18/21  18:54 EDT    Critical Care  Performed by: Capo Soto MD  Authorized by: Capo Soto MD     Critical care provider statement:     Critical care time was exclusive of:  Separately billable procedures and treating other patients    Critical care was necessary to treat or prevent imminent or life-threatening deterioration of the following conditions:  Sepsis    Critical care was time spent personally by me on the following activities:  Development of treatment plan with patient or surrogate, discussions with consultants, examination of patient, evaluation of patient's response to treatment, ordering and review of laboratory studies, ordering and review of radiographic studies, pulse oximetry, re-evaluation of patient's condition, review of old charts and ordering and performing treatments and interventions  Comments:      Between 30 and 74 minutes          PPE: Both the patient and I wore a surgical mask throughout the entire patient encounter. I wore protective goggles.     Diagnosis  Final diagnoses:   Hypokalemia   Sepsis, due to unspecified organism, unspecified whether acute organ dysfunction present (CMS/[facility])   Acute renal failure, unspecified acute renal failure type (CMS/[facility])        Capo Soto MD  03/18/21 [id number]       Capo Soto MD  03/18/21 [id number]

## 2021-03-19 LAB
ANION GAP SERPL CALCULATED.3IONS-SCNC: 7.1 MMOL/L (ref 5–15)
BUN SERPL-MCNC: 28 MG/DL (ref 6–20)
BUN/CREAT SERPL: 24.1 (ref 7–25)
CALCIUM SPEC-SCNC: 9.1 MG/DL (ref 8.6–10.5)
CHLORIDE SERPL-SCNC: 110 MMOL/L (ref 98–107)
CO2 SERPL-SCNC: 21.9 MMOL/L (ref 22–29)
CREAT SERPL-MCNC: 1.16 MG/DL (ref 0.57–1)
DEPRECATED RDW RBC AUTO: 37.8 FL (ref 37–54)
ERYTHROCYTE [DISTWIDTH] IN BLOOD BY AUTOMATED COUNT: 15 % (ref 12.3–15.4)
GFR SERPL CREATININE-BSD FRML MDRD: 58 ML/MIN/1.73
GLUCOSE BLDC GLUCOMTR-MCNC: 117 MG/DL (ref 70–130)
GLUCOSE BLDC GLUCOMTR-MCNC: 141 MG/DL (ref 70–130)
GLUCOSE SERPL-MCNC: 128 MG/DL (ref 65–99)
HCT VFR BLD AUTO: 28.4 % (ref 34–46.6)
HGB BLD-MCNC: 9.6 G/DL (ref 12–15.9)
MCH RBC QN AUTO: 24.1 PG (ref 26.6–33)
MCHC RBC AUTO-ENTMCNC: 33.8 G/DL (ref 31.5–35.7)
MCV RBC AUTO: 71.2 FL (ref 79–97)
PLATELET # BLD AUTO: 64 10*3/MM3 (ref 140–450)
PMV BLD AUTO: ABNORMAL FL
POTASSIUM SERPL-SCNC: 4.3 MMOL/L (ref 3.5–5.2)
QT INTERVAL: 295 MS
RBC # BLD AUTO: 3.99 10*6/MM3 (ref 3.77–5.28)
SODIUM SERPL-SCNC: 139 MMOL/L (ref 136–145)
TROPONIN T SERPL-MCNC: 0.09 NG/ML (ref 0–0.03)
WBC # BLD AUTO: 22.69 10*3/MM3 (ref 3.4–10.8)

## 2021-03-19 PROCEDURE — 25010000002 FENTANYL CITRATE (PF) 100 MCG/2ML SOLUTION: Performed by: NURSE ANESTHETIST, CERTIFIED REGISTERED

## 2021-03-19 PROCEDURE — 84484 ASSAY OF TROPONIN QUANT: CPT | Performed by: INTERNAL MEDICINE

## 2021-03-19 PROCEDURE — 80048 BASIC METABOLIC PNL TOTAL CA: CPT | Performed by: INTERNAL MEDICINE

## 2021-03-19 PROCEDURE — 85027 COMPLETE CBC AUTOMATED: CPT | Performed by: INTERNAL MEDICINE

## 2021-03-19 PROCEDURE — 0T768DZ DILATION OF RIGHT URETER WITH INTRALUMINAL DEVICE, VIA NATURAL OR ARTIFICIAL OPENING ENDOSCOPIC: ICD-10-PCS | Performed by: UROLOGY

## 2021-03-19 PROCEDURE — 25010000002 ONDANSETRON PER 1 MG: Performed by: NURSE ANESTHETIST, CERTIFIED REGISTERED

## 2021-03-19 PROCEDURE — 25010000003 CEFTRIAXONE PER 250 MG: Performed by: INTERNAL MEDICINE

## 2021-03-19 PROCEDURE — 25010000003 POTASSIUM CHLORIDE 10 MEQ/100ML SOLUTION: Performed by: UROLOGY

## 2021-03-19 PROCEDURE — 25010000003 POTASSIUM CHLORIDE 10 MEQ/100ML SOLUTION: Performed by: INTERNAL MEDICINE

## 2021-03-19 PROCEDURE — 25010000002 NEOSTIGMINE PER 0.5 MG: Performed by: NURSE ANESTHETIST, CERTIFIED REGISTERED

## 2021-03-19 PROCEDURE — 25010000002 LEVOFLOXACIN PER 250 MG: Performed by: INTERNAL MEDICINE

## 2021-03-19 PROCEDURE — BT1D1ZZ FLUOROSCOPY OF RIGHT KIDNEY, URETER AND BLADDER USING LOW OSMOLAR CONTRAST: ICD-10-PCS | Performed by: UROLOGY

## 2021-03-19 PROCEDURE — 82962 GLUCOSE BLOOD TEST: CPT

## 2021-03-19 DEVICE — URETERAL STENT
Type: IMPLANTABLE DEVICE | Site: URETER | Status: FUNCTIONAL
Brand: CONTOUR™

## 2021-03-19 RX ORDER — GLYCOPYRROLATE 0.2 MG/ML
INJECTION INTRAMUSCULAR; INTRAVENOUS AS NEEDED
Status: DISCONTINUED | OUTPATIENT
Start: 2021-03-19 | End: 2021-03-19 | Stop reason: SURG

## 2021-03-19 RX ORDER — LEVOFLOXACIN 5 MG/ML
750 INJECTION, SOLUTION INTRAVENOUS
Status: DISCONTINUED | OUTPATIENT
Start: 2021-03-19 | End: 2021-03-19

## 2021-03-19 RX ORDER — DIPHENHYDRAMINE HCL 25 MG
25 CAPSULE ORAL
Status: DISCONTINUED | OUTPATIENT
Start: 2021-03-19 | End: 2021-03-19 | Stop reason: HOSPADM

## 2021-03-19 RX ORDER — CHOLECALCIFEROL (VITAMIN D3) 125 MCG
5 CAPSULE ORAL NIGHTLY PRN
Status: DISCONTINUED | OUTPATIENT
Start: 2021-03-19 | End: 2021-03-22 | Stop reason: HOSPADM

## 2021-03-19 RX ORDER — LEVOFLOXACIN 5 MG/ML
750 INJECTION, SOLUTION INTRAVENOUS EVERY 24 HOURS
Status: COMPLETED | OUTPATIENT
Start: 2021-03-20 | End: 2021-03-21

## 2021-03-19 RX ORDER — FENTANYL CITRATE 50 UG/ML
INJECTION, SOLUTION INTRAMUSCULAR; INTRAVENOUS AS NEEDED
Status: DISCONTINUED | OUTPATIENT
Start: 2021-03-18 | End: 2021-03-19 | Stop reason: SURG

## 2021-03-19 RX ORDER — HYDROMORPHONE HYDROCHLORIDE 1 MG/ML
0.5 INJECTION, SOLUTION INTRAMUSCULAR; INTRAVENOUS; SUBCUTANEOUS
Status: DISCONTINUED | OUTPATIENT
Start: 2021-03-19 | End: 2021-03-19 | Stop reason: HOSPADM

## 2021-03-19 RX ORDER — ONDANSETRON 2 MG/ML
INJECTION INTRAMUSCULAR; INTRAVENOUS AS NEEDED
Status: DISCONTINUED | OUTPATIENT
Start: 2021-03-19 | End: 2021-03-19 | Stop reason: SURG

## 2021-03-19 RX ORDER — SODIUM CHLORIDE 0.9 % (FLUSH) 0.9 %
10 SYRINGE (ML) INJECTION EVERY 12 HOURS SCHEDULED
Status: DISCONTINUED | OUTPATIENT
Start: 2021-03-19 | End: 2021-03-19

## 2021-03-19 RX ORDER — SODIUM CHLORIDE 0.9 % (FLUSH) 0.9 %
10 SYRINGE (ML) INJECTION AS NEEDED
Status: DISCONTINUED | OUTPATIENT
Start: 2021-03-19 | End: 2021-03-19

## 2021-03-19 RX ORDER — DEXAMETHASONE SODIUM PHOSPHATE 10 MG/ML
INJECTION INTRAMUSCULAR; INTRAVENOUS AS NEEDED
Status: DISCONTINUED | OUTPATIENT
Start: 2021-03-18 | End: 2021-03-19 | Stop reason: SURG

## 2021-03-19 RX ORDER — FENTANYL CITRATE 50 UG/ML
50 INJECTION, SOLUTION INTRAMUSCULAR; INTRAVENOUS
Status: DISCONTINUED | OUTPATIENT
Start: 2021-03-19 | End: 2021-03-19 | Stop reason: HOSPADM

## 2021-03-19 RX ORDER — DIPHENHYDRAMINE HYDROCHLORIDE 50 MG/ML
12.5 INJECTION INTRAMUSCULAR; INTRAVENOUS
Status: DISCONTINUED | OUTPATIENT
Start: 2021-03-19 | End: 2021-03-19 | Stop reason: HOSPADM

## 2021-03-19 RX ORDER — NALOXONE HCL 0.4 MG/ML
0.2 VIAL (ML) INJECTION AS NEEDED
Status: DISCONTINUED | OUTPATIENT
Start: 2021-03-19 | End: 2021-03-19 | Stop reason: HOSPADM

## 2021-03-19 RX ORDER — FLUMAZENIL 0.1 MG/ML
0.2 INJECTION INTRAVENOUS AS NEEDED
Status: DISCONTINUED | OUTPATIENT
Start: 2021-03-19 | End: 2021-03-19 | Stop reason: HOSPADM

## 2021-03-19 RX ORDER — PROMETHAZINE HYDROCHLORIDE 25 MG/1
25 SUPPOSITORY RECTAL ONCE AS NEEDED
Status: DISCONTINUED | OUTPATIENT
Start: 2021-03-19 | End: 2021-03-19 | Stop reason: HOSPADM

## 2021-03-19 RX ORDER — LABETALOL HYDROCHLORIDE 5 MG/ML
5 INJECTION, SOLUTION INTRAVENOUS
Status: DISCONTINUED | OUTPATIENT
Start: 2021-03-19 | End: 2021-03-19 | Stop reason: HOSPADM

## 2021-03-19 RX ORDER — ATORVASTATIN CALCIUM 80 MG/1
80 TABLET, FILM COATED ORAL NIGHTLY
Status: DISCONTINUED | OUTPATIENT
Start: 2021-03-19 | End: 2021-03-19

## 2021-03-19 RX ORDER — LIDOCAINE HYDROCHLORIDE 20 MG/ML
INJECTION, SOLUTION INFILTRATION; PERINEURAL AS NEEDED
Status: DISCONTINUED | OUTPATIENT
Start: 2021-03-18 | End: 2021-03-19 | Stop reason: SURG

## 2021-03-19 RX ORDER — OXYCODONE AND ACETAMINOPHEN 7.5; 325 MG/1; MG/1
1 TABLET ORAL ONCE AS NEEDED
Status: DISCONTINUED | OUTPATIENT
Start: 2021-03-19 | End: 2021-03-19 | Stop reason: HOSPADM

## 2021-03-19 RX ORDER — PROMETHAZINE HYDROCHLORIDE 25 MG/1
25 TABLET ORAL ONCE AS NEEDED
Status: DISCONTINUED | OUTPATIENT
Start: 2021-03-19 | End: 2021-03-19 | Stop reason: HOSPADM

## 2021-03-19 RX ORDER — HYDROCODONE BITARTRATE AND ACETAMINOPHEN 7.5; 325 MG/1; MG/1
1 TABLET ORAL ONCE AS NEEDED
Status: DISCONTINUED | OUTPATIENT
Start: 2021-03-19 | End: 2021-03-19 | Stop reason: HOSPADM

## 2021-03-19 RX ORDER — EPHEDRINE SULFATE 50 MG/ML
5 INJECTION, SOLUTION INTRAVENOUS ONCE AS NEEDED
Status: DISCONTINUED | OUTPATIENT
Start: 2021-03-19 | End: 2021-03-19 | Stop reason: HOSPADM

## 2021-03-19 RX ORDER — ONDANSETRON 2 MG/ML
4 INJECTION INTRAMUSCULAR; INTRAVENOUS ONCE AS NEEDED
Status: DISCONTINUED | OUTPATIENT
Start: 2021-03-19 | End: 2021-03-19 | Stop reason: HOSPADM

## 2021-03-19 RX ORDER — CEFTRIAXONE SODIUM 2 G/50ML
2 INJECTION, SOLUTION INTRAVENOUS EVERY 24 HOURS
Status: DISCONTINUED | OUTPATIENT
Start: 2021-03-19 | End: 2021-03-22 | Stop reason: HOSPADM

## 2021-03-19 RX ADMIN — CEFTRIAXONE SODIUM 2 G: 2 INJECTION, SOLUTION INTRAVENOUS at 02:21

## 2021-03-19 RX ADMIN — POTASSIUM CHLORIDE 10 MEQ: 7.46 INJECTION, SOLUTION INTRAVENOUS at 03:09

## 2021-03-19 RX ADMIN — LEVOFLOXACIN 750 MG: 750 INJECTION, SOLUTION INTRAVENOUS at 03:10

## 2021-03-19 RX ADMIN — POTASSIUM CHLORIDE 10 MEQ: 7.46 INJECTION, SOLUTION INTRAVENOUS at 01:45

## 2021-03-19 RX ADMIN — GLYCOPYRROLATE 0.2 MG: 0.2 INJECTION INTRAMUSCULAR; INTRAVENOUS at 00:08

## 2021-03-19 RX ADMIN — FENTANYL CITRATE 50 MCG: 50 INJECTION INTRAMUSCULAR; INTRAVENOUS at 00:02

## 2021-03-19 RX ADMIN — Medication 5 MG: at 22:56

## 2021-03-19 RX ADMIN — ONDANSETRON 4 MG: 2 INJECTION INTRAMUSCULAR; INTRAVENOUS at 00:02

## 2021-03-19 RX ADMIN — NEOSTIGMINE METHYLSULFATE 3 MG: 1 INJECTION INTRAMUSCULAR; INTRAVENOUS; SUBCUTANEOUS at 00:08

## 2021-03-19 NOTE — ANESTHESIA PREPROCEDURE EVALUATION
Anesthesia Evaluation     Patient summary reviewed and Nursing notes reviewed   history of anesthetic complications: PONV  NPO Solid Status: > 8 hours  NPO Liquid Status: > 8 hours           Airway   Mallampati: II  TM distance: >3 FB  Neck ROM: full  Anterior and Possible difficult intubation  Dental - normal exam     Pulmonary - negative pulmonary ROS and normal exam   Cardiovascular - normal exam  Exercise tolerance: good (4-7 METS)    (+) hypertension, hyperlipidemia,       Neuro/Psych- negative ROS  GI/Hepatic/Renal/Endo    (+)   renal disease stones,     Musculoskeletal (-) negative ROS    Abdominal    Substance History - negative use     OB/GYN negative ob/gyn ROS         Other - negative ROS                       Anesthesia Plan    ASA 3 - emergent     general     intravenous induction     Anesthetic plan, all risks, benefits, and alternatives have been provided, discussed and informed consent has been obtained with: patient.    Plan discussed with CRNA.

## 2021-03-19 NOTE — PAYOR COMM NOTE
"Mehnaz Denney (60 y.o. Female)                                 ATTENTION;   INITIAL CLINICAL FOR REVIEW ,    AUTH PENDING [id number]                                REPLY TO UR DEPT, BRI MEMBRENO LPN  [phone] OR CALL            Date of Birth Social Security Number Address Home Phone MRN    1960  [address] [place] [address] [phone] [id number]    Spiritism Marital Status          Zoroastrian Single       Admission Date Admission Type Admitting Provider Attending Provider Department, Room/Bed    3/18/21 Emergency Froylan Castillo MD Anaya, Ervin H., MD [facility] INTENSIVE CARE, I387/1    Discharge Date Discharge Disposition Discharge Destination                       Attending Provider: Froylan Castillo MD    Allergies: No Known Allergies    Isolation: None   Infection: None   Code Status: CPR    Ht: 180.3 cm (71\")   Wt: 71.9 kg (158 lb 8.2 oz)    Admission Cmt: None   Principal Problem: None                Active Insurance as of 3/18/2021     Primary Coverage     Payor Plan Insurance Group Employer/Plan Group    [employer]HEM BLUE CROSS ANTHEM BLUE CROSS BLUE SHIELD PPO [id number]     Payor Plan Address Payor Plan Phone Number Payor Plan Fax Number Effective Dates    [address] [phone]  9/1/2019 - None Entered    [place] [address]       Subscriber Name Subscriber Birth Date Member ID       MEHNAZ DENNEY 1960 [id number]                 Emergency Contacts      (Rel.) Home Phone Work Phone Mobile Phone    KIRSTIE DENNEY (Daughter) [phone] -- [phone]    AYANNA DENNEY (Daughter) [phone] -- [phone]               History & Physical      Froyaln Castillo MD at 03/18/21 2052          Group: [facility]         Critical care admission note    Patient Identification:  Mehnaz Denney  60 y.o.  female  1960  [id number]                CC: Right flank pain, fatigue    History " of Present Illness:  60-year-old -American female who presents for right-sided flank pain.  She has been feeling fatigued since last week Thursday.  The severe fatigue and generalized weakness has worsened gradually over the last week.  Associated with a few episodes of diarrhea recently.  Associated with right-sided flank pain.  Nothing alleviates it, nothing in particular worsens it.  Denies any dysuria, nausea or vomiting.  No vaginal bleeding.  I discussed the case with Joanne Lantigua, nurse practitioner in the emergency room.  No outside records available for review in this hospital system.    Review of Systems   Constitutional: Positive for fatigue. Negative for diaphoresis and fever.   HENT: Negative for ear discharge and sore throat.    Eyes: Negative for pain and visual disturbance.   Respiratory: Negative for cough and shortness of breath.    Cardiovascular: Negative for chest pain and leg swelling.   Gastrointestinal: Negative for abdominal pain and diarrhea.   Endocrine: Negative for cold intolerance and polyuria.   Genitourinary: Positive for flank pain. Negative for dysuria and hematuria.   Musculoskeletal: Negative for joint swelling and myalgias.   Skin: Negative for rash and wound.   Neurological: Positive for weakness. Negative for speech difficulty and numbness.   Hematological: Negative for adenopathy. Does not bruise/bleed easily.   Psychiatric/Behavioral: Negative for agitation and confusion.       Past Medical History:  Past Medical History:   Diagnosis Date   • History of anemia    • Hyperlipidemia    • Hypertension    • PONV (postoperative nausea and vomiting)    • Post-menopause bleeding    • Uterine fibroid        Past Surgical History:  Past Surgical History:   Procedure Laterality Date   •  SECTION      TWINS   • D & C WITH SUCTION     • D & C WITH SUCTION     • LAPAROSCOPIC TUBAL LIGATION          Home Meds:  (Not in a hospital admission)      Allergies:  No Known  "Allergies    Social History:   Social History     Socioeconomic History   • Marital status: Single     Spouse name: Not on file   • Number of children: Not on file   • Years of education: Not on file   • Highest education level: Not on file   Tobacco Use   • Smoking status: Never Smoker   • Smokeless tobacco: Never Used   Substance and Sexual Activity   • Alcohol use: Yes     Alcohol/week: 1.0 standard drinks     Types: 1 Glasses of wine per week     Comment: Occ//Caffeine use: 2-3 cups daily   • Drug use: Defer   • Sexual activity: Defer     Partners: Male     Birth control/protection: Tubal ligation       Family History:  Family History   Adopted: Yes   Problem Relation Age of Onset   • Hypertension Mother    • Hypertension Father    • No Known Problems Daughter    • No Known Problems Daughter    • Malig Hyperthermia Neg Hx        Physical Exam:  /66   Pulse 101   Temp 97.8 °F (36.6 °C) (Tympanic)   Resp 16   Ht 180.3 cm (71\")   Wt 72.1 kg (159 lb)   LMP  (LMP Unknown)   SpO2 98%   BMI 22.18 kg/m²  Body mass index is 22.18 kg/m². 98% 72.1 kg (159 lb)  Physical Exam  Constitutional:       Appearance: Normal appearance.   HENT:      Right Ear: External ear normal.      Left Ear: External ear normal.      Nose: Nose normal.   Eyes:      Conjunctiva/sclera: Conjunctivae normal.      Pupils: Pupils are equal, round, and reactive to light.   Neck:      Thyroid: No thyromegaly.      Vascular: No JVD.      Trachea: No tracheal deviation.   Cardiovascular:      Rate and Rhythm: Normal rate and regular rhythm.      Heart sounds: Normal heart sounds. No murmur heard.     Pulmonary:      Effort: Pulmonary effort is normal.      Breath sounds: Normal breath sounds.   Abdominal:      Comments: Tenderness to palpation over the right flank and costal spinal angle posteriorly.  Her abdomen is soft with some mild tenderness over the right side on the palpation.  No rebound.  No distention   Musculoskeletal:         " General: No deformity. Normal range of motion.      Cervical back: Neck supple. No rigidity.   Skin:     General: Skin is warm.      Findings: No rash.      Comments: No palpable nodules   Neurological:      General: No focal deficit present.      Mental Status: She is alert and oriented to person, place, and time.      Cranial Nerves: No cranial nerve deficit.      Sensory: No sensory deficit.   Psychiatric:         Mood and Affect: Mood normal.         Thought Content: Thought content normal.         LABS:  COVID19   Date Value Ref Range Status   03/18/2021 Not Detected Not Detected - Ref. Range Final       Lab Results   Component Value Date    CALCIUM 9.9 03/18/2021     Results from last 7 days   Lab Units 03/18/21  1659 03/18/21  1658   MAGNESIUM mg/dL  --  1.8   SODIUM mmol/L  --  136   POTASSIUM mmol/L  --  2.7*   CHLORIDE mmol/L  --  99   CO2 mmol/L  --  20.5*   BUN mg/dL  --  38*   CREATININE mg/dL  --  2.10*   GLUCOSE mg/dL  --  129*   CALCIUM mg/dL  --  9.9   WBC 10*3/mm3 13.41*  --    HEMOGLOBIN g/dL 12.8  --    PLATELETS 10*3/mm3 80*  --    ALT (SGPT) U/L  --  29   AST (SGOT) U/L  --  42*   PROBNP pg/mL  --  3,659.0*   PROCALCITONIN ng/mL  --  190.76*     Lab Results   Component Value Date    TROPONINT 0.360 (C) 03/18/2021     Results from last 7 days   Lab Units 03/18/21  1658   TROPONIN T ng/mL 0.360*         Results from last 7 days   Lab Units 03/18/21  1711 03/18/21  1658   PROCALCITONIN ng/mL  --  190.76*   LACTATE mmol/L 4.3*  --              Results from last 7 days   Lab Units 03/18/21  1658   INR  1.11*         Lab Results   Component Value Date    TSH 6.380 (H) 03/18/2021     Estimated Creatinine Clearance: 32.8 mL/min (A) (by C-G formula based on SCr of 2.1 mg/dL (H)).  Results from last 7 days   Lab Units 03/18/21  1829   NITRITE UA  Negative   WBC UA /HPF Too Numerous to Count*   BACTERIA UA /HPF Unable to determine due to loaded field*   SQUAM EPITHEL UA /HPF Unable to determine due to  loaded field*        Imaging: I personally visualized the images of CT scan of the abdomen and pelvis.  Radiologist interpretation is as follows:  CONCLUSION: Sizable, 17 x 12 mm calculus within the proximal right  ureter just below the UPJ causing hydronephrosis. Grossly in the arch  uterus with several fibroids noted.      Assessment:  Sepsis  Hypokalemia  Urolithiasis  Acute kidney injury  Urinary tract infection, probable pyelonephritis.      Recommendations:  Admit to the intensive care unit.  Patient is septic.  Has extremely elevated procalcitonin.  Start double coverage for possible pyelonephritis with Levaquin and Rocephin.  Prefer not to use vancomycin or Zosyn due to potential for worsening renal injury from these antibiotics.  Give large volume IV fluid bolus.  Consult urology.  Patient may need urologic intervention and stone extraction from ureter tonight.  Monitor urine output closely.  Start IV Levophed if patient becomes hypotensive after fluid boluses completed.    Total critical care time 38 minutes, excluding any separately billable procedure time    Froylan Castillo MD  3/18/2021  20:52 EDT      Much of this encounter note is an electronic transcription/translation of spoken language to printed text using Dragon Software.    Electronically signed by Froylan Castillo MD at 03/18/21 2102          Emergency Department Notes      Bryce Dillon RN at 03/18/21 1611        Pt arrives via PV. Pt reports increasing fatigue that started 7 day ago worsening today. Pt reports being seen by urgent care that told her she was dehydrated and to go to er. Pt also complains of diarrhea x2  that started today.    Pt masked on arrival, staff masked      Electronically signed by Bryce Dillon RN at 03/18/21 1616     Joanne Lantigua APRN at 03/18/21 1627      Procedure Orders    1. Critical Care [[id number]] ordered by Joanne Lantigua APRN          Attestation signed by Capo Soto MD at 03/19/21 6677                 For this patient encounter, I reviewed the NP or PA documentation, treatment plan, and medical decision making. Capo Soto MD 3/19/2021 07:56 EDT                  EMERGENCY DEPARTMENT ENCOUNTER    Room Number:  07/07  Date seen:  3/18/2021  Time seen: 16:27 EDT  PCP: Efrem Cantrell MD  Historian: Patient, daughter    HPI:  Chief complaint: Severe weakness, fatigue and diarrhea  A complete HPI/ROS/PMH/PSH/SH/FH are unobtainable due to: Not applicable  Context:Mehnaz Denney is a 60 y.o. female who presents to the ED with c/o feeling poorly since last Thursday including weakness and fatigue and today started with 3 episodes of moderate diarrhea.  Her symptoms are not improved or worsened by anything and she has not had this problem before.  She was seen in urgent care and sent here due to concern for dehydration and elevated heart rate.  She denies any chest pain, chest tightness or chest pressure, she has no shortness of breath.  She has had chills but no fever she denies any nausea or vomiting, no recent antibiotic use in the last 90 days and has no urinary symptoms or abdominal pain.  She has no history of abdominal surgeries.  She denies ill contacts and has not been around anyone with covid.  She states she is scheduled for hysterectomy the beginning of April due to fibroids but she also states that she is postmenopausal and has not had any vaginal bleeding.  She also denies any rectal bleeding or black stools.    Patient was placed in face mask in first look. Patient was wearing facemask when I entered the room and throughout our encounter. I wore full protective equipment throughout this patient encounter including a face mask, eye shield and gloves. Hand hygiene/washing of hands was performed before donning protective equipment and after removal when leaving the room.    MEDICAL RECORD REVIEW    ALLERGIES  Patient has no known allergies.    PAST MEDICAL HISTORY  Active Ambulatory Problems      Diagnosis Date Noted   • Iron deficiency anemia due to chronic blood loss 2020   • Essential hypertension 2020   • Uterine fibroid 2020   • Hypercholesterolemia 2020   • Postmenopausal bleeding 2020   • Intramural leiomyoma of uterus 2020   • PMB (postmenopausal bleeding) 2021     Resolved Ambulatory Problems     Diagnosis Date Noted   • No Resolved Ambulatory Problems     Past Medical History:   Diagnosis Date   • History of anemia    • Hyperlipidemia    • Hypertension    • PONV (postoperative nausea and vomiting)    • Post-menopause bleeding        PAST SURGICAL HISTORY  Past Surgical History:   Procedure Laterality Date   •  SECTION      TWINS   • D & C WITH SUCTION     • D & C WITH SUCTION     • LAPAROSCOPIC TUBAL LIGATION         FAMILY HISTORY  Family History   Adopted: Yes   Problem Relation Age of Onset   • Hypertension Mother    • Hypertension Father    • No Known Problems Daughter    • No Known Problems Daughter    • Malig Hyperthermia Neg Hx        SOCIAL HISTORY  Social History     Socioeconomic History   • Marital status: Single     Spouse name: Not on file   • Number of children: Not on file   • Years of education: Not on file   • Highest education level: Not on file   Tobacco Use   • Smoking status: Never Smoker   • Smokeless tobacco: Never Used   Substance and Sexual Activity   • Alcohol use: Yes     Alcohol/week: 1.0 standard drinks     Types: 1 Glasses of wine per week     Comment: Occ//Caffeine use: 2-3 cups daily   • Drug use: Defer   • Sexual activity: Defer     Partners: Male     Birth control/protection: Tubal ligation       REVIEW OF SYSTEMS  Review of Systems    All systems reviewed and negative except for those discussed in HPI.     PHYSICAL EXAM    ED Triage Vitals   Temp Heart Rate Resp BP SpO2   21 1616 21 1614 21 1614 21 1617 21 1614   97.8 °F (36.6 °C) (!) 146 20 (!) 76/50 100 %      Temp src  Heart Rate Source Patient Position BP Location FiO2 (%)   03/18/21 1616 03/18/21 1614 -- -- --   Tympanic Monitor        Physical Exam    I have reviewed the triage vital signs and nursing notes.      GENERAL: Appears unwell  HENT: nares patent, mm dry  EYES: no scleral icterus  NECK: no ROM limitations  CV: regular rhythm, tachycardia, no murmur, no rubs, no gallups  RESPIRATORY: normal effort, CTAB  ABDOMEN: soft, mild generalized tenderness, NO guarding, NO rebound, BS  normal  : deferred  MUSCULOSKELETAL: no deformity  NEURO: alert, moves all extremities, follows commands  SKIN: warm, dry    Critical Care  Performed by: Joanne Lantigua APRN  Authorized by: Capo Soto MD     Critical care provider statement:     Critical care time (minutes):  40    Critical care was necessary to treat or prevent imminent or life-threatening deterioration of the following conditions:  Cardiac failure, dehydration, metabolic crisis, renal failure and sepsis    Critical care was time spent personally by me on the following activities:  Blood draw for specimens, development of treatment plan with patient or surrogate, discussions with consultants, evaluation of patient's response to treatment, examination of patient, obtaining history from patient or surrogate, ordering and performing treatments and interventions, ordering and review of laboratory studies, ordering and review of radiographic studies, pulse oximetry, re-evaluation of patient's condition and review of old charts          LAB RESULTS  Recent Results (from the past 24 hour(s))   Comprehensive Metabolic Panel    Collection Time: 03/18/21  4:58 PM    Specimen: Blood   Result Value Ref Range    Glucose 129 (H) 65 - 99 mg/dL    BUN 38 (H) 6 - 20 mg/dL    Creatinine 2.10 (H) 0.57 - 1.00 mg/dL    Sodium 136 136 - 145 mmol/L    Potassium 2.7 (L) 3.5 - 5.2 mmol/L    Chloride 99 98 - 107 mmol/L    CO2 20.5 (L) 22.0 - 29.0 mmol/L    Calcium 9.9 8.6 - 10.5 mg/dL    Total  Protein 6.7 6.0 - 8.5 g/dL    Albumin 2.90 (L) 3.50 - 5.20 g/dL    ALT (SGPT) 29 1 - 33 U/L    AST (SGOT) 42 (H) 1 - 32 U/L    Alkaline Phosphatase 203 (H) 39 - 117 U/L    Total Bilirubin 1.7 (H) 0.0 - 1.2 mg/dL    eGFR  African Amer 29 (L) >60 mL/min/1.73    Globulin 3.8 gm/dL    A/G Ratio 0.8 g/dL    BUN/Creatinine Ratio 18.1 7.0 - 25.0    Anion Gap 16.5 (H) 5.0 - 15.0 mmol/L   Protime-INR    Collection Time: 03/18/21  4:58 PM    Specimen: Blood   Result Value Ref Range    Protime 14.1 11.7 - 14.2 Seconds    INR 1.11 (H) 0.90 - 1.10   Lipase    Collection Time: 03/18/21  4:58 PM    Specimen: Blood   Result Value Ref Range    Lipase 13 13 - 60 U/L   BNP    Collection Time: 03/18/21  4:58 PM    Specimen: Blood   Result Value Ref Range    proBNP 3,659.0 (H) 0.0 - 900.0 pg/mL   Troponin    Collection Time: 03/18/21  4:58 PM    Specimen: Blood   Result Value Ref Range    Troponin T 0.360 (C) 0.000 - 0.030 ng/mL   Procalcitonin    Collection Time: 03/18/21  4:58 PM    Specimen: Blood   Result Value Ref Range    Procalcitonin 190.76 (H) 0.00 - 0.25 ng/mL   TSH    Collection Time: 03/18/21  4:58 PM    Specimen: Blood   Result Value Ref Range    TSH 6.380 (H) 0.270 - 4.200 uIU/mL   T4, Free    Collection Time: 03/18/21  4:58 PM    Specimen: Blood   Result Value Ref Range    Free T4 1.47 0.93 - 1.70 ng/dL   Magnesium    Collection Time: 03/18/21  4:58 PM    Specimen: Blood   Result Value Ref Range    Magnesium 1.8 1.6 - 2.6 mg/dL   CBC Auto Differential    Collection Time: 03/18/21  4:59 PM    Specimen: Blood   Result Value Ref Range    WBC 13.41 (H) 3.40 - 10.80 10*3/mm3    RBC 5.56 (H) 3.77 - 5.28 10*6/mm3    Hemoglobin 12.8 12.0 - 15.9 g/dL    Hematocrit 39.1 34.0 - 46.6 %    MCV 70.3 (L) 79.0 - 97.0 fL    MCH 23.0 (L) 26.6 - 33.0 pg    MCHC 32.7 31.5 - 35.7 g/dL    RDW 15.1 12.3 - 15.4 %    RDW-SD 37.1 37.0 - 54.0 fl    MPV      Platelets 80 (L) 140 - 450 10*3/mm3   Manual Differential    Collection Time: 03/18/21  4:59  PM    Specimen: Blood   Result Value Ref Range    Neutrophil % 91.9 (H) 42.7 - 76.0 %    Lymphocyte % 4.0 (L) 19.6 - 45.3 %    Monocyte % 3.0 (L) 5.0 - 12.0 %    Basophil % 1.0 0.0 - 1.5 %    Neutrophils Absolute 12.32 (H) 1.70 - 7.00 10*3/mm3    Lymphocytes Absolute 0.54 (L) 0.70 - 3.10 10*3/mm3    Monocytes Absolute 0.40 0.10 - 0.90 10*3/mm3    Basophils Absolute 0.13 0.00 - 0.20 10*3/mm3    Anisocytosis Slight/1+ None Seen    Hypochromia Slight/1+ None Seen    Microcytes Slight/1+ None Seen    WBC Morphology Normal Normal    Platelet Morphology Normal Normal   Lactic Acid, Plasma    Collection Time: 03/18/21  5:11 PM    Specimen: Blood   Result Value Ref Range    Lactate 4.3 (C) 0.5 - 2.0 mmol/L   ECG 12 Lead    Collection Time: 03/18/21  5:18 PM   Result Value Ref Range    QT Interval 295 ms   Urinalysis With Microscopic If Indicated (No Culture) - Urine, Clean Catch    Collection Time: 03/18/21  6:29 PM    Specimen: Urine, Clean Catch   Result Value Ref Range    Color, UA Dark Yellow (A) Yellow, Straw    Appearance, UA Turbid (A) Clear    pH, UA 5.5 5.0 - 8.0    Specific Gravity, UA 1.013 1.005 - 1.030    Glucose, UA Negative Negative    Ketones, UA Negative Negative    Bilirubin, UA Negative Negative    Blood, UA Large (3+) (A) Negative    Protein,  mg/dL (2+) (A) Negative    Leuk Esterase, UA Large (3+) (A) Negative    Nitrite, UA Negative Negative    Urobilinogen, UA 1.0 E.U./dL 0.2 - 1.0 E.U./dL   Urinalysis, Microscopic Only - Urine, Clean Catch    Collection Time: 03/18/21  6:29 PM    Specimen: Urine, Clean Catch   Result Value Ref Range    RBC, UA Unable to determine due to loaded field (A) None Seen, 0-2 /HPF    WBC, UA Too Numerous to Count (A) None Seen, 0-2 /HPF    Bacteria, UA Unable to determine due to loaded field (A) None Seen /HPF    Squamous Epithelial Cells, UA Unable to determine due to loaded field (A) None Seen, 0-2 /HPF    Hyaline Casts, UA Unable to determine due to loaded field  None Seen /LPF    Methodology Manual Light Microscopy    COVID-19,[facility]PHE[facility], NP SWAB IN TRANSPORT MEDIA 8-12 HR TAT - Swab, Nasopharynx    Collection Time: 03/18/21  7:07 PM    Specimen: Nasopharynx; Swab   Result Value Ref Range    COVID19 Not Detected Not Detected - Ref. Range         RADIOLOGY RESULTS  CT Abdomen Pelvis Without Contrast   Final Result      XR Chest 1 View   Final Result   No acute process.       This report was finalized on 3/18/2021 5:19 PM by Dr. Edwardo Mera M.D.          XR Pyelogram Retrograde    (Results Pending)         PROGRESS, DATA ANALYSIS, CONSULTS AND MEDICAL DECISION MAKING  All labs have been independently reviewed by me.  All radiology studies have been reviewed by me and discussed with radiologist dictating the report.  EKG's independently viewed and interpreted by me unless stated otherwise. Discussion below represents my analysis of pertinent findings related to patient's condition, differential diagnosis, treatment plan and final disposition.     ED Course as of Mar 18 2018   Thu Mar 18, 2021   1721 HR is decreasing and BP improving after NS bolus has started.     [EW]   1743 Potassium(!): 2.7 [EW]   1743 Creatinine(!): 2.10 [EW]   1743 BUN(!): 38 [EW]   1743 WBC(!): 13.41 [EW]   1743 Lactate(!!): 4.3 [EW]   1747 Platelets(!): 80 [EW]   1800 Patient has no new medications in the past week.  She had normal renal function 7 days ago.  Despite elevated troponin this is thought to be related to strain from the kidneys and the tachycardia.  The patient has no complaints at all of chest pain or shortness of breath.  She is already stating that she starts to feel better after the fluids.  Dr. Soto and I have both discussed admission with her and CT scan has been ordered.    [EW]   1805 Patient did state that she had some back pain on the right side several days ago.  We will check a bladder scan    [EW]   1816 EKG          EKG time: 1718  Rhythm/Rate: Sinus  tachycardia, rate 118  P waves and MT: Normal P, normal MT  QRS, axis: Narrow QRS, leftward axis  ST and T waves: Diffuse rounded ST changes    Interpreted Contemporaneously by me, independently viewed  New ST changes compared to prior EKG dated 1/13/2021      [TR]   1901 Discussed CT scan with Dr. Mcfadden, radiologist.  Patient has a 17 x 12 mm proximal calculus in the right ureter causing moderate hydronephrosis.  This definitely explains the patient's septic picture.  Antibiotics have been started and I have put a call out to urology.  I have updated patient as well as her 2 daughters I made her n.p.o. and will get her admitted to the hospitalist.    [EW]   1908 I phoned lab to discuss changing the covid order to [place] from [place] due to possible urgent surgery.     [EW]   1911 MDM/dispo: This patient is clearly septic from a 17 x 12 mm right-sided proximal ureter stone.  She has elevated lactic acid, elevated WBC and was tachycardic and hypotensive.  I have started Zosyn and vancomycin as well as consulted urology for possible stent placement this evening.  Patient's heart rate and blood pressure have started to improve after IV fluids given.    [EW]   1931 Discussed admission to ICU with Dr. Castillo who agrees to accept.  I am waiting on call from Urology.     [EW]   1953 Discussed patient with Dr. Bowens, urology.  He will call the operating room to plan to take patient for stenting this evening.    [EW]      ED Course User Index  [EW] Joanne Lantigua, JUAN MANUEL  [TR] Capo Soto MD     DDX: sepsis, covid, urosepsis, NSTEMI,     MdM:  As above in ED course     Reviewed pt's history and workup with Dr. Soto.  After a bedside evaluation, Dr. Soto agrees with the plan of care.    Based on the patient's lab findings and presenting symptoms, the doctor and I feel it is appropriate to admit the patient for further management, evaluation, and treatment.  I have discussed this with the admitting team.  I have  "also discussed this with the patient/family.  They are in agreement with admission.          Disposition vitals:  /66   Pulse 105   Temp 97.8 °F (36.6 °C) (Tympanic)   Resp 16   Ht 180.3 cm (71\")   Wt 72.1 kg (159 lb)   LMP  (LMP Unknown)   SpO2 97%   BMI 22.18 kg/m²       DIAGNOSIS  Final diagnoses:   Hypokalemia   Sepsis, due to unspecified organism, unspecified whether acute organ dysfunction present (CMS/HCC)   Acute renal failure, unspecified acute renal failure type (CMS/HCC)   Right renal stone   Hydronephrosis with urinary obstruction due to ureteral calculus     Admission   to take patient to Operating Room this evening for stent and then she will go to ICU.      Grzegorz Joannefranci Soni, APRN  03/18/21 2019      Electronically signed by Capo Soto MD at 03/19/21 0756     Capo Soto MD at 03/18/21 1821      Procedure Orders    1. Critical Care [[id number]] ordered by Capo Soto MD MD ATTESTATION NOTE    The MARIA ESTHER and I have discussed this patient's history, physical exam, and treatment plan.  I have reviewed the documentation and personally had a face to face interaction with the patient. I affirm the documentation and agree with the treatment and plan.  The attached note describes my personal findings.      Mehnaz Denney is a 60 y.o. female who presents to the ED c/o generalized weakness, diarrhea.  She reports that she did start developing fatigue 7 days ago.  She reports he got worse today.  Today she developed diarrhea that was severe.  She went to the urgent care center and they told her to come to the emergency room.  She denies abdominal pain.  She reports nausea.  She denies sick contacts.  She denies similar prior episodes.  She has not taken any medicine for her symptoms.  Upon arrival here she was found to be hypotensive and tachycardic.  She reports she has been eating and drinking well.      On exam:  GENERAL: Awake, alert, no acute distress, " ill-appearing  SKIN: Warm, dry  HENT: Normocephalic, atraumatic  EYES: no scleral icterus  CV: regular rhythm, tachycardic  RESPIRATORY: normal effort, lungs clear  ABDOMEN: soft, non-tender, non-distended  MUSCULOSKELETAL: no deformity  NEURO: alert, moves all extremities, follows commands    Labs  Recent Results (from the past 24 hour(s))   Comprehensive Metabolic Panel    Collection Time: 03/18/21  4:58 PM    Specimen: Blood   Result Value Ref Range    Glucose 129 (H) 65 - 99 mg/dL    BUN 38 (H) 6 - 20 mg/dL    Creatinine 2.10 (H) 0.57 - 1.00 mg/dL    Sodium 136 136 - 145 mmol/L    Potassium 2.7 (L) 3.5 - 5.2 mmol/L    Chloride 99 98 - 107 mmol/L    CO2 20.5 (L) 22.0 - 29.0 mmol/L    Calcium 9.9 8.6 - 10.5 mg/dL    Total Protein 6.7 6.0 - 8.5 g/dL    Albumin 2.90 (L) 3.50 - 5.20 g/dL    ALT (SGPT) 29 1 - 33 U/L    AST (SGOT) 42 (H) 1 - 32 U/L    Alkaline Phosphatase 203 (H) 39 - 117 U/L    Total Bilirubin 1.7 (H) 0.0 - 1.2 mg/dL    eGFR  African Amer 29 (L) >60 mL/min/1.73    Globulin 3.8 gm/dL    A/G Ratio 0.8 g/dL    BUN/Creatinine Ratio 18.1 7.0 - 25.0    Anion Gap 16.5 (H) 5.0 - 15.0 mmol/L   Protime-INR    Collection Time: 03/18/21  4:58 PM    Specimen: Blood   Result Value Ref Range    Protime 14.1 11.7 - 14.2 Seconds    INR 1.11 (H) 0.90 - 1.10   Lipase    Collection Time: 03/18/21  4:58 PM    Specimen: Blood   Result Value Ref Range    Lipase 13 13 - 60 U/L   BNP    Collection Time: 03/18/21  4:58 PM    Specimen: Blood   Result Value Ref Range    proBNP 3,659.0 (H) 0.0 - 900.0 pg/mL   Troponin    Collection Time: 03/18/21  4:58 PM    Specimen: Blood   Result Value Ref Range    Troponin T 0.360 (C) 0.000 - 0.030 ng/mL   Procalcitonin    Collection Time: 03/18/21  4:58 PM    Specimen: Blood   Result Value Ref Range    Procalcitonin 190.76 (H) 0.00 - 0.25 ng/mL   TSH    Collection Time: 03/18/21  4:58 PM    Specimen: Blood   Result Value Ref Range    TSH 6.380 (H) 0.270 - 4.200 uIU/mL   T4, Free     Collection Time: 03/18/21  4:58 PM    Specimen: Blood   Result Value Ref Range    Free T4 1.47 0.93 - 1.70 ng/dL   Magnesium    Collection Time: 03/18/21  4:58 PM    Specimen: Blood   Result Value Ref Range    Magnesium 1.8 1.6 - 2.6 mg/dL   CBC Auto Differential    Collection Time: 03/18/21  4:59 PM    Specimen: Blood   Result Value Ref Range    WBC 13.41 (H) 3.40 - 10.80 10*3/mm3    RBC 5.56 (H) 3.77 - 5.28 10*6/mm3    Hemoglobin 12.8 12.0 - 15.9 g/dL    Hematocrit 39.1 34.0 - 46.6 %    MCV 70.3 (L) 79.0 - 97.0 fL    MCH 23.0 (L) 26.6 - 33.0 pg    MCHC 32.7 31.5 - 35.7 g/dL    RDW 15.1 12.3 - 15.4 %    RDW-SD 37.1 37.0 - 54.0 fl    MPV      Platelets 80 (L) 140 - 450 10*3/mm3   Manual Differential    Collection Time: 03/18/21  4:59 PM    Specimen: Blood   Result Value Ref Range    Neutrophil % 91.9 (H) 42.7 - 76.0 %    Lymphocyte % 4.0 (L) 19.6 - 45.3 %    Monocyte % 3.0 (L) 5.0 - 12.0 %    Basophil % 1.0 0.0 - 1.5 %    Neutrophils Absolute 12.32 (H) 1.70 - 7.00 10*3/mm3    Lymphocytes Absolute 0.54 (L) 0.70 - 3.10 10*3/mm3    Monocytes Absolute 0.40 0.10 - 0.90 10*3/mm3    Basophils Absolute 0.13 0.00 - 0.20 10*3/mm3    Anisocytosis Slight/1+ None Seen    Hypochromia Slight/1+ None Seen    Microcytes Slight/1+ None Seen    WBC Morphology Normal Normal    Platelet Morphology Normal Normal   Lactic Acid, Plasma    Collection Time: 03/18/21  5:11 PM    Specimen: Blood   Result Value Ref Range    Lactate 4.3 (C) 0.5 - 2.0 mmol/L   ECG 12 Lead    Collection Time: 03/18/21  5:18 PM   Result Value Ref Range    QT Interval 295 ms       Radiology  XR Chest 1 View    Result Date: 3/18/2021  XR CHEST 1 VW-  03/18/2021  HISTORY: Shortness of breath.  Heart size is within normal limits. Lungs appear free of acute infiltrates. Bones and soft tissues are unremarkable.      No acute process.  This report was finalized on 3/18/2021 5:19 PM by Dr. Edwardo Mera M.D.        Medical Decision Making:  ED Course as of Mar 18 1854     u Mar 18, 2021   1721 HR is decreasing and BP improving after NS bolus has started.     [EW]   1743 Potassium(!): 2.7 [EW]   1743 Creatinine(!): 2.10 [EW]   1743 BUN(!): 38 [EW]   1743 WBC(!): 13.41 [EW]   1743 Lactate(!!): 4.3 [EW]   1747 Platelets(!): 80 [EW]   1800 Patient has no new medications in the past week.  She had normal renal function 7 days ago.  Despite elevated troponin this is thought to be related to strain from the kidneys and the tachycardia.  The patient has no complaints at all of chest pain or shortness of breath.  She is already stating that she starts to feel better after the fluids.  Dr. Soto and I have both discussed admission with her and CT scan has been ordered.    [EW]   1805 Patient did state that she had some back pain on the right side several days ago.  We will check a bladder scan    [EW]   1816 EKG          EKG time: 1718  Rhythm/Rate: Sinus tachycardia, rate 118  P waves and ME: Normal P, normal ME  QRS, axis: Narrow QRS, leftward axis  ST and T waves: Diffuse rounded ST changes    Interpreted Contemporaneously by me, independently viewed  New ST changes compared to prior EKG dated 1/13/2021      [TR]      ED Course User Index  [EW] Joanne Lantigua, APRN  [TR] Capo Soto MD       Plan sepsis work-up including lactic acid, chemistries, CBC, imaging of her abdomen and pelvis given her diarrhea and abnormal vital signs.  Plan IV fluids, antibiotics.    SEPTIC SHOCK FOCUSED EXAM ATTESTATION    I attest that I have reassessed tissue perfusion after the fluid bolus given.    Capo Soto MD  03/18/21  18:54 EDT    Critical Care  Performed by: Capo Soto MD  Authorized by: Capo Soto MD     Critical care provider statement:     Critical care time was exclusive of:  Separately billable procedures and treating other patients    Critical care was necessary to treat or prevent imminent or life-threatening deterioration of the following conditions:  Sepsis     Critical care was time spent personally by me on the following activities:  Development of treatment plan with patient or surrogate, discussions with consultants, examination of patient, evaluation of patient's response to treatment, ordering and review of laboratory studies, ordering and review of radiographic studies, pulse oximetry, re-evaluation of patient's condition, review of old charts and ordering and performing treatments and interventions  Comments:      Between 30 and 74 minutes          PPE: Both the patient and I wore a surgical mask throughout the entire patient encounter. I wore protective goggles.     Diagnosis  Final diagnoses:   Hypokalemia   Sepsis, due to unspecified organism, unspecified whether acute organ dysfunction present (CMS/[facility])   Acute renal failure, unspecified acute renal failure type (CMS/[facility])        Capo Soto MD  03/18/21 1826       Capo Soto MD  03/18/21 1854      Electronically signed by Capo Soto MD at 03/18/21 1854            Vital Signs (last day)     Date/Time   Temp   Temp src   Pulse   Resp   BP   Patient Position   SpO2    03/19/21 1330   --   --   93   --   127/65   --   98    03/19/21 1300   --   --   77   --   113/71   --   97    03/19/21 1230   --   --   77   --   112/71   --   96    03/19/21 1200   --   --   76   --   113/70   --   97    03/19/21 1130   --   --   77   --   114/71   --   97    03/19/21 1100   98.3 (36.8)   Oral   --   --   114/74   --   --    03/19/21 1059   --   --   80   --   --   --   97    03/19/21 1030   --   --   87   --   120/78   --   99    03/19/21 1000   --   --   84   --   106/73   --   96    03/19/21 0930   --   --   80   --   107/77   --   97    03/19/21 0900   --   --   --   --   112/68   --   --    03/19/21 0859   --   --   79   --   --   --   98    03/19/21 0839   97.8 (36.6)   Oral   --   --   --   --   --    03/19/21 0830   --   --   83   --   116/74   --   98    03/19/21 0800   --   --   78   --   116/68   --    98    03/19/21 0730   --   --   85   --   108/66   --   98    03/19/21 0700   --   --   79   --   114/70   --   97    03/19/21 0645   --   --   76   --   --   --   98    03/19/21 0630   --   --   80   --   113/71   --   98    03/19/21 0615   --   --   84   --   --   --   99    03/19/21 0600   --   --   80   --   113/69   --   97    03/19/21 0545   --   --   80   --   --   --   98    03/19/21 0530   --   --   79   --   112/73   --   99    03/19/21 0515   --   --   85   --   --   --   98    03/19/21 0500   --   --   89   --   115/71   --   96    03/19/21 0430   --   --   86   --   112/73   --   97    03/19/21 0415   --   --   85   --   --   --   97    03/19/21 0400   --   --   85   --   102/72   --   97    03/19/21 0345   --   --   87   --   --   --   98    03/19/21 0342   98.4 (36.9)   Oral   --   --   --   --   --    03/19/21 0330   --   --   86   --   115/69   --   98    03/19/21 0315   --   --   91   --   --   --   98    03/19/21 0300   --   --   --   --   107/65   --   --    03/19/21 0245   --   --   85   --   --   --   98    03/19/21 0241   --   --   --   --   112/69   --   --    03/19/21 0230   --   --   90   --   --   --   98    03/19/21 0215   --   --   91   --   --   --   100    03/19/21 0200   --   --   93   --   --   --   99    03/19/21 0145   --   --   92   --   --   --   97    03/19/21 0139   --   --   --   --   113/74   --   --    03/19/21 0115   --   --   95   16   115/70   --   98    03/19/21 0100   98.7 (37.1)   Oral   98   16   120/74   --   99    03/19/21 0045   --   --   93   16   113/68   --   100    03/19/21 0030   --   --   98   16   106/69   --   100    03/19/21 0025   --   --   97   16   102/60   --   100    03/19/21 0020   98.6 (37)   Oral   98   16   103/69   --   100    03/18/21 2323   98.7 (37.1)   Oral   96   16   110/68   Lying   98    03/18/21 2300   --   --   98   --   109/72   --   98    03/18/21 2245   --   --   90   --   108/65   --   98    03/18/21 2230   --   --   96   --    107/76   --   97    03/18/21 2215   --   --   95   --   106/67   --   97    03/18/21 2200   --   --   101   --   --   --   98    03/18/21 2145   --   --   101   --   --   --   97    03/18/21 2140   99.1 (37.3)   Oral   --   --   --   --   --    03/18/21 2136   --   --   --   --   110/71   --   99    03/18/21 2048   --   --   101   --   --   --   98    03/18/21 1930   --   --   105   --   106/66   --   97    03/18/21 18:38:34   --   --   114   16   98/64   Lying   99    03/18/21 1800   --   --   --   --   93/55   --   --    03/18/21 17:04:14   --   --   --   --   92/58   Lying   --    03/18/21 1617   --   --   --   --   (!) 76/50   --   --    03/18/21 1616   97.8 (36.6)   Tympanic   --   --   --   --   --    03/18/21 1614   --   --   (!) 146   20   --   --   100                   Lines, Drains & Airways    Active LDAs     Name:   Placement date:   Placement time:   Site:   Days:    Peripheral IV 03/18/21 1625 Right Antecubital   03/18/21    1625    Antecubital   less than 1    Peripheral IV 03/18/21 1807 Left Antecubital   03/18/21    1807    Antecubital   less than 1    Urethral Catheter Non-latex 16 Fr.   03/19/21    0003     less than 1    Ureteral Drain/Stent Right ureter 6 Fr.   03/19/21    0002    Right ureter   less than 1             Facility-Administered Medications as of 3/19/2021   Medication Dose Route Frequency Provider Last Rate Last Admin   • cefTRIAXone (ROCEPHIN) IVPB 2 g  2 g Intravenous Q24H Froylan Castillo MD   Stopped at 03/19/21 0500   • lactated ringers infusion  9 mL/hr Intravenous Continuous PRN Remy Bowens MD   Stopped at 03/19/21 0152   • levoFLOXacin (LEVAQUIN) 750 mg/150 mL D5W (premix) (LEVAQUIN) 750 mg  750 mg Intravenous Q48H Froylan Castillo MD   Stopped at 03/19/21 0613   • [COMPLETED] piperacillin-tazobactam (ZOSYN) 3.375 g in iso-osmotic dextrose 50 ml (premix)  3.375 g Intravenous Once Joanne Lantigua APRN   Stopped at 03/18/21 2006   • [COMPLETED] potassium chloride  (K-DUR,KLOR-CON) ER tablet 40 mEq  40 mEq Oral Once Joanne Lantigua APRN   40 mEq at 03/18/21 1847   • [COMPLETED] potassium chloride 10 mEq in 100 mL IVPB  10 mEq Intravenous Once Froylan Castillo MD   Stopped at 03/18/21 2315   • [COMPLETED] potassium chloride 10 mEq in 100 mL IVPB  10 mEq Intravenous Once Froylan Castillo MD   Stopped at 03/19/21 0150   • [COMPLETED] potassium chloride 10 mEq in 100 mL IVPB  10 mEq Intravenous Once Remy Bowens MD   Stopped at 03/19/21 0409   • [COMPLETED] potassium chloride 10 mEq in 100 mL IVPB  10 mEq Intravenous Once Froylan Castillo MD   Stopped at 03/19/21 0300   • [COMPLETED] sodium chloride 0.9 % bolus 2,163 mL  30 mL/kg Intravenous Once Joanne Lantigua APRN   Stopped at 03/18/21 1834   • sodium chloride 0.9 % flush 10 mL  10 mL Intravenous PRN Remy Bowens MD       • [COMPLETED] vancomycin 1500 mg/500 mL 0.9% NS IVPB ([facility])  20 mg/kg Intravenous Once Joanne Lantigua APRN   Stopped at 03/18/21 2218     Orders (last 24 hrs)      Start     Ordered    03/20/21 0600  Hemoglobin A1c  Morning Draw,   Status:  Canceled      03/19/21 0902 03/20/21 0600  Lipid Panel  Morning Draw,   Status:  Canceled      03/19/21 0902 03/19/21 2100  atorvastatin (LIPITOR) tablet 80 mg  Nightly,   Status:  Discontinued      03/19/21 0902 03/19/21 1251  Diet Regular  Diet Effective Now      03/19/21 1250    03/19/21 1205  POC Glucose Once  Once      03/19/21 1137    03/19/21 1200  Intake and Output  Every 4 Hours,   Status:  Canceled      03/19/21 0902 03/19/21 1200  POC Glucose Q6H  Every 6 Hours,   Status:  Canceled     Comments: May Discontinue After 2 Consecutive Readings Less Than 140Notify Provider if 2 Readings Greater Than 140      03/19/21 0902    03/19/21 1000  sodium chloride 0.9 % flush 10 mL  Every 12 Hours Scheduled,   Status:  Discontinued      03/19/21 0902    03/19/21 1000  niCARdipine (CARDENE) 25 mg in 250 mL NS infusion kit  Titrated,   Status:  Discontinued       03/19/21 0902    03/19/21 0910  Inpatient Admission  Once      03/19/21 0909    03/19/21 0902  Reason For No Antithrombotic On Admission  Once     Comments: Ct findings    03/19/21 0902    03/19/21 0900  Vital Signs  Per Order Details,   Status:  Canceled     Comments: For ICU Admission: Vital Signs Every 2 Hours  For Telemetry Unit Admission: Vital Signs Every 4 Hours  Keep Systolic Blood Pressure Less Than 220, Diastolic Blood Pressure Less Than 110    03/19/21 0902    03/19/21 0900  Pulse Oximetry, Continuous  Continuous,   Status:  Canceled      03/19/21 0902    03/19/21 0900  Cardiac Monitoring  Continuous,   Status:  Canceled      03/19/21 0902    03/19/21 0900  Notify Provider  Until Discontinued,   Status:  Canceled      03/19/21 0902    03/19/21 0900  Turn Patient  Now Then Every 2 Hours,   Status:  Canceled      03/19/21 0902    03/19/21 0900  Nursing Dysphagia Screening (Complete Prior to Giving Anything By Mouth)  Once,   Status:  Canceled      03/19/21 0902    03/19/21 0900  RN to Place Order SLP Consult - Eval & Treat Choosing Reason of RN Dysphagia Screen Failed  Per Order Details,   Status:  Canceled     Comments: RN to Place Order SLP Consult - Eval & Treat Choosing Reason of RN Dysphagia Screen Failed    03/19/21 0902    03/19/21 0900  Nurse to Call MD or Nutrition Services for Diet if Patient Passes Dysphagia Screen  Once,   Status:  Canceled      03/19/21 0902    03/19/21 0900  NPO Diet  Diet Effective Now,   Status:  Canceled     Comments: Strict NPO Until Nursing Dysphagia Screen Passed    03/19/21 0902    03/19/21 0900  Neuro Checks  Per Order Details,   Status:  Canceled     Comments: For ICU Admission: Neuro Checks to Include All Stroke Deficits Every Hour x10 Hours Then Every 2 Hours  For Telemetry Unit Admission: Neuro Checks to Include All Stroke Deficits Every 4 Hours    03/19/21 0902    03/19/21 0900  NIHSS Assessment  Every Shift,   Status:  Canceled     Comments: Turn off all  sedation medications prior to performing assessment. Assessment to be performed upon admission, transfer to another unit, discharge, and with neurological decline. If NIHSS change is greater than or equal to 4 and/or neurological decline is noted notify physician.    03/19/21 0902 03/19/21 0900  Provide Stroke Education Material  Prior to Discharge,   Status:  Canceled     Comments: Educate patient PRN and daily during hospitalization.    03/19/21 0902 03/19/21 0900  Notify Stroke Coordinator  Once,   Status:  Canceled     Provider:  (Not yet assigned)    03/19/21 0902 03/19/21 0900  Inpatient Rehab Admission Consult  Once,   Status:  Canceled     Provider:  (Not yet assigned)    03/19/21 0902 03/19/21 0900  OT Consult: Eval & Treat  Once,   Status:  Canceled      03/19/21 0902    03/19/21 0900  PT Consult: Eval & Treat As Tolerated  Once,   Status:  Canceled      03/19/21 0902 03/19/21 0900  SLP Consult: Eval & Treat Communication Disorder  Once     Comments: Per stroke protocol.    03/19/21 0902 03/19/21 0900  Inpatient Case Management  Consult  Once,   Status:  Canceled     Provider:  (Not yet assigned)    03/19/21 0902 03/19/21 0900  Inpatient Diabetes Educator Consult  Once,   Status:  Canceled     Provider:  (Not yet assigned)    03/19/21 0902 03/19/21 0900  Assessed for Rehabilitation Services  Once      03/19/21 0902 03/19/21 0900  Reason for Not Administering IV Thrombolytic  Once      03/19/21 0902 03/19/21 0900  Insert Peripheral IV  Once,   Status:  Canceled      03/19/21 0902 03/19/21 0900  Saline Lock & Maintain IV Access  Continuous,   Status:  Canceled      03/19/21 0902 03/19/21 0900  Place Sequential Compression Device  Once,   Status:  Canceled      03/19/21 0902 03/19/21 0900  Maintain Sequential Compression Device  Continuous,   Status:  Canceled      03/19/21 0902 03/19/21 0859  sodium chloride 0.9 % flush 10 mL  As Needed,   Status:   Discontinued      03/19/21 0902    03/19/21 0859  Order CT Head Without Contrast for Neurological Decline  As Needed,   Status:  Canceled      03/19/21 0902    03/19/21 0614  POC Glucose Once  Once      03/19/21 0610    03/19/21 0330  levoFLOXacin (LEVAQUIN) 750 mg/150 mL D5W (premix) (LEVAQUIN) 750 mg  Every 48 Hours      03/19/21 0131    03/19/21 0302  Troponin  Once      03/19/21 0301    03/19/21 0300  cefTRIAXone (ROCEPHIN) 2 g in sodium chloride 0.9 % 100 mL IVPB  Every 24 Hours,   Status:  Discontinued      03/19/21 0131    03/19/21 0300  cefTRIAXone (ROCEPHIN) IVPB 2 g  Every 24 Hours      03/19/21 0201    03/19/21 0252  CBC (No Diff)  Once      03/19/21 0252    03/19/21 0252  Basic Metabolic Panel  Once      03/19/21 0252    03/19/21 0153  Diet Regular  Diet Effective Now,   Status:  Canceled      03/19/21 0152    03/19/21 0100  potassium chloride 10 mEq in 100 mL IVPB  Once      03/18/21 2217    03/19/21 0036  Continue Indwelling Urinary Catheter  Once      03/19/21 0036    03/19/21 0036  Assess Need for Indwelling Urinary Catheter - Follow Removal Protocol  Continuous     Comments: Indwelling Urinary Catheter Removal Criteria  Discontinue Indwelling Urinary Catheter Unless One of the Following is Present:  Urinary Retention or Obstruction  Chronic Urinary Catheter Use  End of Life  Critical Illness with Strict I/O   Tract or Abdominal Surgery  Stage 3/4 Sacral / Perineal Wound  Required Activity Restriction: Trauma  Required Activity Restriction: Spine Surgery  If Patient is Being Followed by Urology Contact Them PRIOR to Removal  Do Not Remove Indwelling Urinary Catheter Order is Present with a CLINICAL REASON to Maintain the Catheter. Provider is Required to Include a Clinical Reason to Maintain a Urinary Catheter    Patient Admitted With Indwelling Urinary Catheter (Not Placed at [facility])  Assess for Continued Need & Document Medical Necessity  If Infection is Suspected, Contact the  Provider        03/19/21 0036 03/19/21 0036  Urinary Catheter Care  Every Shift      03/19/21 0036 03/19/21 0009  Pulse Oximetry, Continuous  Continuous,   Status:  Canceled      03/19/21 0008 03/19/21 0009  POC Glucose Once  Once,   Status:  Canceled     Comments: Post op glucose check on all diabetic patients...Notify Anesthesia if blood sugar is less than 80 mg/dL or greater than 220 mg/dL.      03/19/21 0008 03/19/21 0009  Vital signs every 5 minutes for 15 minutes, every 15 minutes thereafter.  Once,   Status:  Canceled      03/19/21 0008 03/19/21 0009  Call Anesthesiologist for additional IV Fluid bolus for Hypotension/Tachycardia  Until Discontinued,   Status:  Canceled      03/19/21 0008 03/19/21 0009  Notify Anesthesia of Any Acute Changes in Patient Condition  Until Discontinued,   Status:  Canceled      03/19/21 0008 03/19/21 0009  Notify Anesthesia for Unrelieved Pain  Until Discontinued,   Status:  Canceled      03/19/21 0008 03/19/21 0009  Once DC criteria to floor met, follow surgeon's orders.  Until Discontinued,   Status:  Canceled      03/19/21 0008 03/19/21 0009  Discharge patient from PACU when discharge criteria is met.  Until Discontinued,   Status:  Canceled      03/19/21 0008 03/19/21 0008  HYDROcodone-acetaminophen (NORCO) 7.5-325 MG per tablet 1 tablet  Once As Needed,   Status:  Discontinued      03/19/21 0008 03/19/21 0008  HYDROmorphone (DILAUDID) injection 0.5 mg  Every 5 Minutes PRN,   Status:  Discontinued      03/19/21 0008 03/19/21 0008  oxyCODONE-acetaminophen (PERCOCET) 7.5-325 MG per tablet 1 tablet  Once As Needed,   Status:  Discontinued      03/19/21 0008 03/19/21 0008  fentaNYL citrate (PF) (SUBLIMAZE) injection 50 mcg  Every 5 Minutes PRN,   Status:  Discontinued      03/19/21 0008 03/19/21 0008  naloxone (NARCAN) injection 0.2 mg  As Needed,   Status:  Discontinued      03/19/21 0008 03/19/21 0008  flumazenil (ROMAZICON)  injection 0.2 mg  As Needed,   Status:  Discontinued      03/19/21 0008 03/19/21 0008  ondansetron (ZOFRAN) injection 4 mg  Once As Needed,   Status:  Discontinued      03/19/21 0008 03/19/21 0008  promethazine (PHENERGAN) suppository 25 mg  Once As Needed,   Status:  Discontinued      03/19/21 0008 03/19/21 0008  promethazine (PHENERGAN) tablet 25 mg  Once As Needed,   Status:  Discontinued      03/19/21 0008 03/19/21 0008  labetalol (NORMODYNE,TRANDATE) injection 5 mg  Every 5 Minutes PRN,   Status:  Discontinued      03/19/21 0008 03/19/21 0008  ePHEDrine injection 5 mg  Once As Needed,   Status:  Discontinued      03/19/21 0008 03/19/21 0008  diphenhydrAMINE (BENADRYL) injection 12.5 mg  Every 15 Minutes PRN,   Status:  Discontinued      03/19/21 0008 03/19/21 0008  diphenhydrAMINE (BENADRYL) capsule 25 mg  Every 30 Minutes PRN,   Status:  Discontinued      03/19/21 0008 03/19/21 0000  potassium chloride 10 mEq in 100 mL IVPB  Once      03/18/21 2217 03/18/21 2357  Urine Culture - Urine, Kidney, Right  RELEASE UPON ORDERING      03/18/21 2357 03/18/21 2333  sodium chloride 0.9 % flush 10 mL  Every 12 Hours Scheduled,   Status:  Discontinued      03/18/21 2331 03/18/21 2332  Oxygen Therapy- Nasal Cannula; Titrate for SPO2: equal to or greater than, 90%  Continuous,   Status:  Canceled      03/18/21 2331 03/18/21 2332  Pulse Oximetry, Continuous  Continuous,   Status:  Canceled      03/18/21 2331 03/18/21 2332  Insert Peripheral IV  Once,   Status:  Canceled      03/18/21 2331 03/18/21 2332  Saline Lock & Maintain IV Access  Continuous,   Status:  Canceled      03/18/21 2331 03/18/21 2331  midazolam (VERSED) injection 1 mg  Every 10 Minutes PRN,   Status:  Discontinued      03/18/21 2331 03/18/21 2331  midazolam (VERSED) injection 2 mg  Every 10 Minutes PRN,   Status:  Discontinued      03/18/21 2331    03/18/21 2331  Vital Signs - Per Anesthesia Protocol  As Needed,    Status:  Canceled      03/18/21 2331    03/18/21 2331  sodium chloride 0.9 % flush 10 mL  As Needed,   Status:  Discontinued      03/18/21 2331    03/18/21 2331  lidocaine PF 1% (XYLOCAINE) injection 0.5 mL  Once As Needed,   Status:  Discontinued      03/18/21 2331    03/18/21 2331  lactated ringers infusion  Continuous PRN      03/18/21 2331    03/18/21 2316  sterile water irrigation solution  As Needed,   Status:  Discontinued      03/18/21 2316    03/18/21 2315  potassium chloride 10 mEq in 100 mL IVPB  Once      03/18/21 2217 03/18/21 2315  iothalamate (CONRAY) injection  As Needed,   Status:  Discontinued      03/18/21 2315 03/18/21 2205  POC Glucose Once  Once      03/18/21 2202 03/18/21 2200  potassium chloride 10 mEq in 100 mL IVPB  Once      03/18/21 2217 03/18/21 2102  Code Status and Medical Interventions:  Continuous      03/18/21 2101    03/18/21 2102  Place Sequential Compression Device  Once      03/18/21 2101    03/18/21 2102  Maintain Sequential Compression Device  Continuous      03/18/21 2101    03/18/21 2100  Pharmacy Consult  Continuous PRN,   Status:  Discontinued      03/18/21 2101 03/18/21 2011  Timed Lactic Acid, Reflex  PROCEDURE ONCE      03/18/21 1736    03/18/21 2009  XR Pyelogram Retrograde  1 Time Imaging      03/18/21 2008    03/18/21 2003  Obtain Informed Consent  Once      03/18/21 2003 03/18/21 1955  Initiate Observation Status  Once      03/18/21 1955 03/18/21 1917  Critical Care  Once     Comments: This order was created via procedure documentation    03/18/21 1916 03/18/21 1916  Pulmonology (on-call MD unless specified)  Once     Specialty:  Pulmonary Disease  Provider:  (Not yet assigned)    03/18/21 1915 03/18/21 1906  COVID PRE-OP / PRE-PROCEDURE SCREENING ORDER (NO ISOLATION) - Swab, Nasopharynx  Once      03/18/21 1905    03/18/21 1906  COVID-19,[facility] CEPHEID, NP SWAB IN TRANSPORT MEDIA 8-12 HR TAT - Swab, Nasopharynx  PROCEDURE ONCE       03/18/21 1905    03/18/21 1905  [facility] (on-call MD unless specified) Details  Once     Specialty:  Hospitalist  Provider:  (Not yet assigned)    03/18/21 1904    03/18/21 1858  Urology (on-call MD unless specified)  Once     Specialty:  Urology  Provider:  (Not yet assigned)    03/18/21 1901    03/18/21 1839  Urinalysis, Microscopic Only - Urine, Clean Catch  Once      03/18/21 1838    03/18/21 1823  Critical Care  Once     Comments: This order was created via procedure documentation    03/18/21 1822    03/18/21 1802  Bladder scan  Once      03/18/21 1801    03/18/21 1751  piperacillin-tazobactam (ZOSYN) 3.375 g in iso-osmotic dextrose 50 ml (premix)  Once      03/18/21 1749    03/18/21 1751  vancomycin 1500 mg/500 mL 0.9% NS IVPB ([facility])  Once      03/18/21 1749    03/18/21 1748  potassium chloride (K-DUR,KLOR-CON) ER tablet 40 mEq  Once      03/18/21 1746    03/18/21 1747  CT Abdomen Pelvis Without Contrast  1 Time Imaging      03/18/21 1746    03/18/21 1708  Manual Differential  Once      03/18/21 1707    03/18/21 1649  POCT Occult Blood Stool  Once      03/18/21 1649    03/18/21 1637  sodium chloride 0.9 % bolus 2,163 mL  Once      03/18/21 1635    03/18/21 1636  Cardiac Monitoring  Once,   Status:  Canceled      03/18/21 1635    03/18/21 1636  Pulse Oximetry, Continuous  Continuous,   Status:  Canceled      03/18/21 1635    03/18/21 1635  CBC & Differential  Once      03/18/21 1635    03/18/21 1635  COVID PRE-OP / PRE-PROCEDURE SCREENING ORDER (NO ISOLATION) - Swab, Nasopharynx  Once,   Status:  Canceled      03/18/21 1635    03/18/21 1635  Comprehensive Metabolic Panel  Once      03/18/21 1635    03/18/21 1635  Protime-INR  Once      03/18/21 1635    03/18/21 1635  Lipase  Once      03/18/21 1635    03/18/21 1635  Urinalysis With Microscopic If Indicated (No Culture) - Urine, Clean Catch  Once      03/18/21 1635    03/18/21 1635  BNP  Once      03/18/21 1635    03/18/21 1635  Troponin  Once      03/18/21 1635     03/18/21 1635  Blood Culture - Blood, Arm, Left  Once      03/18/21 1635    03/18/21 1635  Blood Culture - Blood, Arm, Left  Once      03/18/21 1635    03/18/21 1635  Lactic Acid, Plasma  Once      03/18/21 1635    03/18/21 1635  Procalcitonin  Once      03/18/21 1635    03/18/21 1635  TSH  Once      03/18/21 1635    03/18/21 1635  T4, Free  Once      03/18/21 1635    03/18/21 1635  Magnesium  Once      03/18/21 1635    03/18/21 1635  ECG 12 Lead  Once      03/18/21 1635    03/18/21 1635  XR Chest 1 View  1 Time Imaging      03/18/21 1635    03/18/21 1635  Insert 2nd peripheral IV  Once      03/18/21 1635    03/18/21 1635  Insert peripheral IV  Once      03/18/21 1635    03/18/21 1635  CBC Auto Differential  PROCEDURE ONCE      03/18/21 1635    03/18/21 1635  COVID-19,[facility], NP/OP SWAB IN UTM/VTM/SALINE TRANSPORT MEDIA,24 HR TAT - Swab, Nasopharynx  PROCEDURE ONCE,   Status:  Canceled      03/18/21 1635    03/18/21 1634  sodium chloride 0.9 % flush 10 mL  As Needed      03/18/21 1635    Unscheduled  Oxygen Therapy- Nasal Cannula; Titrate for SPO2: per policy  Continuous PRN      03/19/21 0008                   Operative/Procedure Notes       Remy Bowens MD at 03/19/21 0008        Urology Operative Note    3/18/2021 - 3/19/2021    Mehnaz Denney  60 y.o.  1960  female  [id number]      Surgeon(s) and Role:  Remy Bowens MD - Primary     Pre-operative Diagnosis: 17 mm right UPJ stone, sepsis    Post-operative Diagnosis: Same    Complications: None    Procedures:  1. Cystoscopy  2. Retrograde pyelogram  3. Right ureteral Stent placement  4. Fluoroscopy with Interpretation     Indications   Mehnaz Denney is a 60 y.o. female who was found to have a 17 mm right UPJ stone and was admitted to ICU for sepsis, lactic acidosis and leukocytosis.    During the informed consent process, the procedure was discussed in detail including the risks of bleeding, infection, ureteral injury,  failure and need for secondary procedures    Findings   Pollick catheter used to aspirate right renal pelvis for very purulent urine, gentle retrograde pyelogram showed minimal hydronephrosis.  Stent placed    Fluoroscopy with interpretation: Retrograde pyelogram after aspiration of renal pelvis showed decompressed calyces.  Stent placed with partial curl into the upper pole    Description of procedure:  The patient was properly identified in the preoperative holding area and taken to the operating room were general anesthesia was induced. The patient was placed in the cystolithotomy position and prepped and draped in a sterile fashion. The patient was given antibiotics intravenously before the start of the surgery. After ensuring that all of the required equipment was ready and available a surgical timeout was performed.     A 21 Hungarian rigid cystoscope was inserted into the bladder under direct visualization.   A Sensorwire was passed up the ureter under fluoroscopic guidance.  Pollick catheter was placed over the sensor wire and used to aspirate urine out of the right renal pelvis which was very purulent.  This was sent for culture.  Wire was replaced and a 6Fr x 24 cm stent was placed under direct and fluoroscopic visualization with curl in the renal pelvis as well as the bladder.  Carvalho was placed    There were no apparent complications. The patient woke up in the operating room and was taken to the recovery room in stable condition.     I was present and scrubbed for the entire procedure.     Specimens: Right renal pelvis aspirated urine for culture    Estimated Blood Loss: minimal      Plan   -Return to ICU  -Follow cultures  -Okay to remove Carvalho once clinically improved  -She will need to follow-up for definitive management of stones         Remy Bowens MD  [facility]  Former[facility] [facility], [address]  [place] [id number] [phone]                 Electronically signed by Remy Bowens MD at  "21 0014          Physician Progress Notes       Zoe Johnson MD at 21 1142            PROGRESS NOTE  Patient Name: Mehnaz Denney  Age/Sex: 60 y.o. female  : 1960  MRN: [id number]    Date of Admission: 3/18/2021  Date of Encounter Visit: 21   LOS: 0 days   Patient Care Team:  Efrem Cantrell MD as PCP - General (Internal Medicine)  Mabel Soto MD as Consulting Physician (Obstetrics and Gynecology)    Chief Complaint: Severe sepsis, nephrolithiasis with UTI, status post stenting, acute renal failure    Hospital course: Patient is doing a lot better at this point, her renal function is almost back to normal, her white count is up but expected to start improving since she is doing clinically better, she is on wide spectrum antibiotic awaiting the final culture results on the urine.  She is awake alert on room air, denies any chest pain or shortness of breath or chest tightness or abdominal pain or nausea or vomiting or diarrhea.        REVIEW OF SYSTEMS:   CONSTITUTIONAL: no fever or chills  CARDIOVASCULAR: No chest pain, chest pressure or chest discomfort. No palpitations or edema.   RESPIRATORY: No shortness of breath, cough or sputum.   GASTROINTESTINAL: No anorexia, nausea, vomiting or diarrhea. No abdominal pain or blood.   HEMATOLOGIC: No bleeding or bruising.     Ventilator/Non-Invasive Ventilation Settings (From admission, onward) Comment    None            Vital Signs  Temp:  [97.8 °F (36.6 °C)-99.1 °F (37.3 °C)] 98.3 °F (36.8 °C)  Heart Rate:  [] 79  Resp:  [16-24] 16  BP: ()/(50-76) 112/68  SpO2:  [96 %-100 %] 98 %  on  Flow (L/min):  [2-4] 2 Device (Oxygen Therapy): room air    Intake/Output Summary (Last 24 hours) at 3/19/2021 1142  Last data filed at 3/19/2021 0800  Gross per 24 hour   Intake 6996 ml   Output 725 ml   Net 6271 ml     Flowsheet Rows      First Filed Value   Admission Height  180.3 cm (71\") Documented at 2021 1704   Admission Weight  " 72.1 kg (159 lb) Documented at 03/18/2021 1704        Body mass index is 22.11 kg/m².      03/18/21  1704 03/18/21  2140   Weight: 72.1 kg (159 lb) 71.9 kg (158 lb 8.2 oz)       Physical Exam:  GEN:  No acute distress, alert, cooperative, well developed   EYES:   Sclerae clear. No icterus. PERRL. Normal EOM  ENT:   External ears/nose normal, no oral lesions, no thrush, mucous membranes moist  NECK:  Supple, midline trachea, no JVD  LUNGS: Normal chest on inspection, CTAB, no wheezes. No rhonchi. No crackles. Respirations regular, even and unlabored.   CV:  Regular rhythm and rate. Normal S1/S2. No murmurs, gallops, or rubs noted.  ABD:  Soft, n no longer tender to palpation and nondistended. Normal bowel sounds. No guarding  EXT:  Moves all extremities well. No cyanosis. No redness. No edema.   Skin: Dry, intact, no bleeding    Results Review:    Results From Last 14 Days   Lab Units 03/18/21  2306 03/18/21  1711 03/11/21  0901   LACTATE mmol/L 1.0 4.3*  --    TRIGLYCERIDES mg/dL  --   --  59     Results from last 7 days   Lab Units 03/19/21  0546 03/18/21  1658   SODIUM mmol/L 139 136   POTASSIUM mmol/L 4.3 2.7*   CHLORIDE mmol/L 110* 99   CO2 mmol/L 21.9* 20.5*   BUN mg/dL 28* 38*   CREATININE mg/dL 1.16* 2.10*   CALCIUM mg/dL 9.1 9.9   AST (SGOT) U/L  --  42*   ALT (SGPT) U/L  --  29   ANION GAP mmol/L 7.1 16.5*   ALBUMIN g/dL  --  2.90*     Results from last 7 days   Lab Units 03/19/21  0546 03/18/21  1658   TROPONIN T ng/mL 0.092* 0.360*     Results from last 7 days   Lab Units 03/18/21  1658   TSH uIU/mL 6.380*     Results from last 7 days   Lab Units 03/18/21  1658   PROBNP pg/mL 3,659.0*     Results from last 7 days   Lab Units 03/19/21  0546 03/18/21  1659   WBC 10*3/mm3 22.69* 13.41*   HEMOGLOBIN g/dL 9.6* 12.8   HEMATOCRIT % 28.4* 39.1   PLATELETS 10*3/mm3 64* 80*   MCV fL 71.2* 70.3*     Results from last 7 days   Lab Units 03/18/21  1658   INR  1.11*     Results from last 7 days   Lab Units  03/18/21  1658   MAGNESIUM mg/dL 1.8           Invalid input(s): LDLCALC          Glucose   Date/Time Value Ref Range Status   03/19/2021 0610 117 70 - 130 mg/dL Final   03/18/2021 2202 123 70 - 130 mg/dL Final     Results from last 7 days   Lab Units 03/18/21  2306 03/18/21  1711 03/18/21  1658   PROCALCITONIN ng/mL  --   --  190.76*   LACTATE mmol/L 1.0 4.3*  --          Results from last 7 days   Lab Units 03/18/21  1829   NITRITE UA  Negative   WBC UA /HPF Too Numerous to Count*   BACTERIA UA /HPF Unable to determine due to loaded field*   SQUAM EPITHEL UA /HPF Unable to determine due to loaded field*     Results from last 7 days   Lab Units 03/18/21  1907   COVID19  Not Detected               Imaging:   Imaging Results (All)     Procedure Component Value Units Date/Time    XR Pyelogram Retrograde [[id number]] Collected: 03/19/21 0038     Updated: 03/19/21 0038    Narrative:        Patient: CHRISTIAN FRANCES  Time Out: 00:37  Exam(s): FILM OTHER     EXAM:    XR Bone Survey, Complete    CLINICAL HISTORY:     RT STENT INSERTION  Reason for exam: RT STENT INSERTION.    TECHNIQUE:    Multiple views of the bones of the axial and appendicular skeleton.    COMPARISON:    No relevant prior studies available.    FINDINGS:    Bones joints:  No acute findings.  No lytic or blastic lesions.    Soft tissues:  Unremarkable.    Tubes, lines and devices:  Right ureteral stent noted which appears to   be in appropriate position.  Contrast is seen within the right renal   collecting system.  Partially visualized enteric tube is noted.    Other findings:  Calcification seen within the right hemipelvis, which   is nonspecific.    IMPRESSION:         Right ureteral stent noted which appears to be in appropriate position.    Contrast is seen within the right renal collecting system.      Impression:          Electronically signed by Elan Sawant MD on 03-19-21 at 0037    CT Abdomen Pelvis Without Contrast [[id number]] Collected:  03/18/21 1905     Updated: 03/18/21 1950    Narrative:      CT ABDOMEN PELVIS WO CONTRAST-     Radiation dose reduction techniques were utilized, including automated  exposure control and exposure modulation based on body size.     CLINICAL: Vague abdominal pain, question sepsis.     COMPARISON: None.     FINDINGS: There is right-sided hydronephrosis and located just below the  right UPJ within the proximal ureter there is a 17 x 12 mm obstructing  calculus. There is a nominal amount of perinephric fat stranding along  the inferior pole of the right kidney. The right kidney appears enlarged  compared to the left. Within the right kidney there are 2 mm size  calculi. The right renal contour is normal. Diameter of the right ureter  is normal below the calculus. The bladder is collapsed. A 6 mm  left-sided renal calculus, the left kidney is otherwise normal, no  left-sided hydronephrosis.     Gross enlargement of the uterus with multiple fibroids, the largest is  calcified measuring 6.6 cm.     The liver, gallbladder, pancreas, spleen and adrenal glands are  satisfactory in appearance, the diameter of the aorta is normal.  Retroaortic left renal vein noted, anatomic variant. No large or small  bowel abnormality seen. The appendix is normal.     CONCLUSION: Sizable, 17 x 12 mm calculus within the proximal right  ureter just below the UPJ causing hydronephrosis. Grossly in the arch  uterus with several fibroids noted.     Findings of this report called to Joanne Lantigua in the emergency room at  the time of completion, 7 PM.        This report was finalized on 3/18/2021 7:47 PM by Dr. Garth Mcfadden M.D.       XR Chest 1 View [[id number]] Collected: 03/18/21 1711     Updated: 03/18/21 1722    Narrative:      XR CHEST 1 VW-  03/18/2021     HISTORY: Shortness of breath.     Heart size is within normal limits. Lungs appear free of acute  infiltrates. Bones and soft tissues are unremarkable.       Impression:      No acute  process.     This report was finalized on 3/18/2021 5:19 PM by Dr. Edwardo Mera M.D.             I reviewed the patient's new clinical results.  I personally viewed and interpreted the patient's imaging results:        Medication Review:   cefTRIAXone, 2 g, Intravenous, Q24H  levoFLOXacin, 750 mg, Intravenous, Q48H        lactated ringers, 9 mL/hr, Last Rate: Stopped (03/19/21 0152)  Pharmacy Consult,         ASSESSMENT:   1. Sepsis   2. Hypokalemia  3. Urolithiasis  4. Acute kidney injury  5. Urinary tract infection, probable pyelonephritis.  6. Non-ST elevation MI type II    PLAN:  Patient is currently on antibiotic with Zosyn and vancomycin, need to follow-up on the culture results and de-escalate on the antibiotic as necessary, renal function has significantly improved with initial supportive measures, lactic acid has normalized, patient was taken to the OR yesterday and underwent successful placement of right ureteral stent.  Patient is hemodynamically stable, off the pressors, and is cleared to transfer out of the ICU, she will be monitored for the next 24 hours and hopefully home in a day or 2 depending on her follow-up labs and overall clinical picture  Discussed with the patient in detail her questions were answered  May need to resume her blood pressure medication as her blood pressure started to trend up patient needs to be on Norvasc.  Patient had abnormal TSH, consider repeating her thyroid function test as an outpatient when more stable  The anemia is likely dilutional  Patient has elevated troponin likely due to the sepsis and renal failure    Summary of recommendation:  1. Transfer to a stepdown unit  2. Continue with the antibiotic and de-escalate depending on the culture results  3. Repeat TSH and thyroid function test as an outpatient  4. Follow-up on renal function and CBC  5. Follow-up of the troponin level    Discussed with patient in details, notes reviewed    Disposition: Transfer to  "stepdown unit, hopefully home tomorrow    Zoe Johnson MD  03/19/21  11:42 EDT      Dictated utilizing Dragon dictation    Electronically signed by Zoe Johnson MD at 03/19/21 1200     Jr Rodriguez Jr., MD at 03/19/21 0628             LOS: 0 days   Patient Care Team:  Efrem Cantrell MD as PCP - General (Internal Medicine)  Mabel Soto MD as Consulting Physician (Obstetrics and Gynecology)      Subjective   Interval History: pain much improved.    Objective     ROS   12 POINT NEG ROS PERTINENT IN HPI      Vital Signs  Temp:  [97.8 °F (36.6 °C)-99.1 °F (37.3 °C)] 98.4 °F (36.9 °C)  Heart Rate:  [] 84  Resp:  [16-24] 16  BP: ()/(50-76) 113/69      Intake/Output Summary (Last 24 hours) at 3/19/2021 0628  Last data filed at 3/19/2021 0444  Gross per 24 hour   Intake 6936 ml   Output 725 ml   Net 6211 ml       Flowsheet Rows      First Filed Value   Admission Height  180.3 cm (71\") Documented at 03/18/2021 1704   Admission Weight  72.1 kg (159 lb) Documented at 03/18/2021 1704          Physical Exam:     General salvatore: alert, cooperative, oriented  Skin:  Skin color, texture, turgor normal, no rashes        Results Review:     I reviewed the patient's new clinical results.  Lab Results (all)     Procedure Component Value Units Date/Time    POC Glucose Once [[id number]]  (Normal) Collected: 03/19/21 0610    Specimen: Blood Updated: 03/19/21 0613     Glucose 117 mg/dL     CBC (No Diff) [[id number]]  (Abnormal) Collected: 03/19/21 0546    Specimen: Blood Updated: 03/19/21 0611     WBC 22.69 10*3/mm3      RBC 3.99 10*6/mm3      Hemoglobin 9.6 g/dL      Hematocrit 28.4 %      MCV 71.2 fL      MCH 24.1 pg      MCHC 33.8 g/dL      RDW 15.0 %      RDW-SD 37.8 fl      MPV --     Comment: Unable to calculate        Platelets 64 10*3/mm3     Basic Metabolic Panel [[id number]] Collected: 03/19/21 0546    Specimen: Blood Updated: 03/19/21 0555    Troponin [[id number]] Collected: 03/19/21 0546    Specimen: " Blood Updated: 03/19/21 0555    Urine Culture - Urine, Kidney, Right [[id number]] Collected: 03/18/21 2356    Specimen: Urine from Kidney, Right Updated: 03/19/21 0025    Timed Lactic Acid, Reflex [[id number]]  (Normal) Collected: 03/18/21 2306    Specimen: Blood Updated: 03/18/21 2340     Lactate 1.0 mmol/L     POC Glucose Once [[id number]]  (Normal) Collected: 03/18/21 2202    Specimen: Blood Updated: 03/18/21 2204     Glucose 123 mg/dL     COVID PRE-OP / PRE-PROCEDURE SCREENING ORDER (NO ISOLATION) - Swab, Nasopharynx [[id number]]  (Normal) Collected: 03/18/21 1907    Specimen: Swab from Nasopharynx Updated: 03/18/21 1959    Narrative:      The following orders were created for panel order COVID PRE-OP / PRE-PROCEDURE SCREENING ORDER (NO ISOLATION) - Swab, Nasopharynx.  Procedure                               Abnormality         Status                     ---------                               -----------         ------                     COVID-19,[facility]...[[id number]]  Normal              Final result                 Please view results for these tests on the individual orders.    COVID-19,[facility] CEPHEID, NP SWAB IN TRANSPORT MEDIA 8-12 HR TAT - Swab, Nasopharynx [[id number]]  (Normal) Collected: 03/18/21 1907    Specimen: Swab from Nasopharynx Updated: 03/18/21 1959     COVID19 Not Detected    Narrative:      Fact sheet for providers: https://www.fda.gov/media/611232/download     Fact sheet for patients: https://www.fda.gov/media/988585/download    Urinalysis, Microscopic Only - Urine, Clean Catch [[id number]]  (Abnormal) Collected: 03/18/21 1829    Specimen: Urine, Clean Catch Updated: 03/18/21 1927     RBC, UA       Unable to determine due to loaded field     /HPF     WBC, UA Too Numerous to Count /HPF      Bacteria, UA       Unable to determine due to loaded field     /HPF     Squamous Epithelial Cells, UA       Unable to determine due to loaded field     /HPF     Hyaline Casts, UA       Unable  "to determine due to loaded field     /LPF     Methodology Manual Light Microscopy    Urinalysis With Microscopic If Indicated (No Culture) - Urine, Clean Catch [[id number]]  (Abnormal) Collected: 03/18/21 1829    Specimen: Urine, Clean Catch Updated: 03/18/21 1914     Color, UA Dark Yellow     Appearance, UA Turbid     pH, UA 5.5     Specific Gravity, UA 1.013     Glucose, UA Negative     Ketones, UA Negative     Bilirubin, UA Negative     Blood, UA Large (3+)     Protein,  mg/dL (2+)     Leuk Esterase, UA Large (3+)     Nitrite, UA Negative     Urobilinogen, UA 1.0 E.U./dL    Procalcitonin [[id number]]  (Abnormal) Collected: 03/18/21 1658    Specimen: Blood Updated: 03/18/21 1841     Procalcitonin 190.76 ng/mL     Narrative:      As a Marker for Sepsis (Non-Neonates):   1. <0.5 ng/mL represents a low risk of severe sepsis and/or septic shock.  1. >2 ng/mL represents a high risk of severe sepsis and/or septic shock.    As a Marker for Lower Respiratory Tract Infections that require antibiotic therapy:  PCT on Admission     Antibiotic Therapy             6-12 Hrs later  > 0.5                Strongly Recommended            >0.25 - <0.5         Recommended  0.1 - 0.25           Discouraged                   Remeasure/reassess PCT  <0.1                 Strongly Discouraged          Remeasure/reassess PCT      As 28 day mortality risk marker: \"Change in Procalcitonin Result\" (> 80 % or <=80 %) if Day 0 (or Day 1) and Day 4 values are available. Refer to http://www.[facility]-pct-calculator.com/   Change in PCT <=80 %   A decrease of PCT levels below or equal to 80 % defines a positive change in PCT test result representing a higher risk for 28-day all-cause mortality of patients diagnosed with severe sepsis or septic shock.  Change in PCT > 80 %   A decrease of PCT levels of more than 80 % defines a negative change in PCT result representing a lower risk for 28-day all-cause mortality of patients diagnosed with severe " sepsis or septic shock.                Results may be falsely decreased if patient taking Biotin.     Blood Culture - Blood, Arm, Left [[id number]] Collected: 03/18/21 1807    Specimen: Blood from Arm, Left Updated: 03/18/21 1819    Troponin [[id number]]  (Abnormal) Collected: 03/18/21 1658    Specimen: Blood Updated: 03/18/21 1753     Troponin T 0.360 ng/mL     Narrative:      Troponin T Reference Range:  <= 0.03 ng/mL-   Negative for AMI  >0.03 ng/mL-     Abnormal for myocardial necrosis.  Clinicians would have to utilize clinical acumen, EKG, Troponin and serial changes to determine if it is an Acute Myocardial Infarction or myocardial injury due to an underlying chronic condition.       Results may be falsely decreased if patient taking Biotin.      T4, Free [[id number]]  (Normal) Collected: 03/18/21 1658    Specimen: Blood Updated: 03/18/21 1743     Free T4 1.47 ng/dL     Narrative:      Results may be falsely increased if patient taking Biotin.      BNP [[id number]]  (Abnormal) Collected: 03/18/21 1658    Specimen: Blood Updated: 03/18/21 1743     proBNP 3,659.0 pg/mL     Narrative:      Among patients with dyspnea, NT-proBNP is highly sensitive for the detection of acute congestive heart failure. In addition NT-proBNP of <300 pg/ml effectively rules out acute congestive heart failure with 99% negative predictive value.    Results may be falsely decreased if patient taking Biotin.      TSH [[id number]]  (Abnormal) Collected: 03/18/21 1658    Specimen: Blood Updated: 03/18/21 1743     TSH 6.380 uIU/mL     CBC & Differential [[id number]]  (Abnormal) Collected: 03/18/21 1659    Specimen: Blood Updated: 03/18/21 1738    Narrative:      The following orders were created for panel order CBC & Differential.  Procedure                               Abnormality         Status                     ---------                               -----------         ------                     CBC Auto Differential[[id number]]         Abnormal            Final result                 Please view results for these tests on the individual orders.    CBC Auto Differential [[id number]]  (Abnormal) Collected: 03/18/21 1659    Specimen: Blood Updated: 03/18/21 1738     WBC 13.41 10*3/mm3      RBC 5.56 10*6/mm3      Hemoglobin 12.8 g/dL      Hematocrit 39.1 %      MCV 70.3 fL      MCH 23.0 pg      MCHC 32.7 g/dL      RDW 15.1 %      RDW-SD 37.1 fl      MPV --     Comment: Unable to calculate        Platelets 80 10*3/mm3     Manual Differential [[id number]]  (Abnormal) Collected: 03/18/21 1659    Specimen: Blood Updated: 03/18/21 1738     Neutrophil % 91.9 %      Lymphocyte % 4.0 %      Monocyte % 3.0 %      Basophil % 1.0 %      Neutrophils Absolute 12.32 10*3/mm3      Lymphocytes Absolute 0.54 10*3/mm3      Monocytes Absolute 0.40 10*3/mm3      Basophils Absolute 0.13 10*3/mm3      Anisocytosis Slight/1+     Hypochromia Slight/1+     Microcytes Slight/1+     WBC Morphology Normal     Platelet Morphology Normal    Comprehensive Metabolic Panel [[id number]]  (Abnormal) Collected: 03/18/21 1658    Specimen: Blood Updated: 03/18/21 1737     Glucose 129 mg/dL      BUN 38 mg/dL      Creatinine 2.10 mg/dL      Sodium 136 mmol/L      Potassium 2.7 mmol/L      Chloride 99 mmol/L      CO2 20.5 mmol/L      Calcium 9.9 mg/dL      Total Protein 6.7 g/dL      Albumin 2.90 g/dL      ALT (SGPT) 29 U/L      AST (SGOT) 42 U/L      Alkaline Phosphatase 203 U/L      Total Bilirubin 1.7 mg/dL      eGFR  African Amer 29 mL/min/1.73      Globulin 3.8 gm/dL      A/G Ratio 0.8 g/dL      BUN/Creatinine Ratio 18.1     Anion Gap 16.5 mmol/L     Narrative:      GFR Normal >60  Chronic Kidney Disease <60  Kidney Failure <15      Lipase [[id number]]  (Normal) Collected: 03/18/21 1658    Specimen: Blood Updated: 03/18/21 1737     Lipase 13 U/L     Magnesium [[id number]]  (Normal) Collected: 03/18/21 1658    Specimen: Blood Updated: 03/18/21 1737     Magnesium 1.8 mg/dL     Lactic Acid,  Plasma [[id number]]  (Abnormal) Collected: 03/18/21 1711    Specimen: Blood Updated: 03/18/21 1736     Lactate 4.3 mmol/L     Protime-INR [[id number]]  (Abnormal) Collected: 03/18/21 1658    Specimen: Blood Updated: 03/18/21 1720     Protime 14.1 Seconds      INR 1.11    Blood Culture - Blood, Arm, Left [[id number]] Collected: 03/18/21 1711    Specimen: Blood from Arm, Left Updated: 03/18/21 1715          Imaging Results (All)     Procedure Component Value Units Date/Time    XR Pyelogram Retrograde [[id number]] Collected: 03/19/21 0038     Updated: 03/19/21 0038    Narrative:        Patient: CHRISTIAN FRANCES  Time Out: 00:37  Exam(s): FILM OTHER     EXAM:    XR Bone Survey, Complete    CLINICAL HISTORY:     RT STENT INSERTION  Reason for exam: RT STENT INSERTION.    TECHNIQUE:    Multiple views of the bones of the axial and appendicular skeleton.    COMPARISON:    No relevant prior studies available.    FINDINGS:    Bones joints:  No acute findings.  No lytic or blastic lesions.    Soft tissues:  Unremarkable.    Tubes, lines and devices:  Right ureteral stent noted which appears to   be in appropriate position.  Contrast is seen within the right renal   collecting system.  Partially visualized enteric tube is noted.    Other findings:  Calcification seen within the right hemipelvis, which   is nonspecific.    IMPRESSION:         Right ureteral stent noted which appears to be in appropriate position.    Contrast is seen within the right renal collecting system.      Impression:          Electronically signed by Elan Sawant MD on 03-19-21 at 0037    CT Abdomen Pelvis Without Contrast [[id number]] Collected: 03/18/21 1905     Updated: 03/18/21 1950    Narrative:      CT ABDOMEN PELVIS WO CONTRAST-     Radiation dose reduction techniques were utilized, including automated  exposure control and exposure modulation based on body size.     CLINICAL: Vague abdominal pain, question sepsis.     COMPARISON: None.      FINDINGS: There is right-sided hydronephrosis and located just below the  right UPJ within the proximal ureter there is a 17 x 12 mm obstructing  calculus. There is a nominal amount of perinephric fat stranding along  the inferior pole of the right kidney. The right kidney appears enlarged  compared to the left. Within the right kidney there are 2 mm size  calculi. The right renal contour is normal. Diameter of the right ureter  is normal below the calculus. The bladder is collapsed. A 6 mm  left-sided renal calculus, the left kidney is otherwise normal, no  left-sided hydronephrosis.     Gross enlargement of the uterus with multiple fibroids, the largest is  calcified measuring 6.6 cm.     The liver, gallbladder, pancreas, spleen and adrenal glands are  satisfactory in appearance, the diameter of the aorta is normal.  Retroaortic left renal vein noted, anatomic variant. No large or small  bowel abnormality seen. The appendix is normal.     CONCLUSION: Sizable, 17 x 12 mm calculus within the proximal right  ureter just below the UPJ causing hydronephrosis. Grossly in the arch  uterus with several fibroids noted.     Findings of this report called to Joanne Lantigua in the emergency room at  the time of completion, 7 PM.        This report was finalized on 3/18/2021 7:47 PM by Dr. Garth Mcfadden M.D.       XR Chest 1 View [[id number]] Collected: 03/18/21 1711     Updated: 03/18/21 1722    Narrative:      XR CHEST 1 VW-  03/18/2021     HISTORY: Shortness of breath.     Heart size is within normal limits. Lungs appear free of acute  infiltrates. Bones and soft tissues are unremarkable.       Impression:      No acute process.     This report was finalized on 3/18/2021 5:19 PM by Dr. Edwardo Mera M.D.             Medication Review:   Current Facility-Administered Medications   Medication Dose Route Frequency Provider Last Rate Last Admin   • cefTRIAXone (ROCEPHIN) IVPB 2 g  2 g Intravenous Q24H Froylan Castillo MD    Stopped at 03/19/21 0500   • lactated ringers infusion  9 mL/hr Intravenous Continuous PRN Remy Bowens MD   Stopped at 03/19/21 0152   • levoFLOXacin (LEVAQUIN) 750 mg/150 mL D5W (premix) (LEVAQUIN) 750 mg  750 mg Intravenous Q48H Froylan Castillo MD   Stopped at 03/19/21 0613   • Pharmacy Consult   Does not apply Continuous PRN Remy Bowens MD       • sodium chloride 0.9 % flush 10 mL  10 mL Intravenous PRN Remy Bowens MD           Current Facility-Administered Medications:   •  cefTRIAXone (ROCEPHIN) IVPB 2 g, 2 g, Intravenous, Q24H, Froylan Castillo MD, Stopped at 03/19/21 0500  •  lactated ringers infusion, 9 mL/hr, Intravenous, Continuous PRN, Remy Bowens MD, Stopped at 03/19/21 0152  •  levoFLOXacin (LEVAQUIN) 750 mg/150 mL D5W (premix) (LEVAQUIN) 750 mg, 750 mg, Intravenous, Q48H, Froylan Castillo MD, Stopped at 03/19/21 0613  •  Pharmacy Consult, , Does not apply, Continuous PRN, Remy Bowens MD  •  [COMPLETED] Insert peripheral IV, , , Once **AND** sodium chloride 0.9 % flush 10 mL, 10 mL, Intravenous, PRN, Rmey Bowens MD  Medications Discontinued During This Encounter   Medication Reason   • sterile water irrigation solution Patient Discharge   • iothalamate (CONRAY) injection Patient Discharge   • sodium chloride 0.9 % flush 10 mL Patient Transfer   • sodium chloride 0.9 % flush 10 mL Patient Transfer   • lidocaine PF 1% (XYLOCAINE) injection 0.5 mL Patient Transfer   • midazolam (VERSED) injection 1 mg Patient Transfer   • midazolam (VERSED) injection 2 mg Patient Transfer   • HYDROcodone-acetaminophen (NORCO) 7.5-325 MG per tablet 1 tablet Patient Transfer   • HYDROmorphone (DILAUDID) injection 0.5 mg Patient Transfer   • oxyCODONE-acetaminophen (PERCOCET) 7.5-325 MG per tablet 1 tablet Patient Transfer   • fentaNYL citrate (PF) (SUBLIMAZE) injection 50 mcg Patient Transfer   • naloxone (NARCAN) injection 0.2 mg Patient Transfer   • flumazenil (ROMAZICON) injection 0.2 mg Patient  Transfer   • ondansetron (ZOFRAN) injection 4 mg Patient Transfer   • promethazine (PHENERGAN) suppository 25 mg Patient Transfer   • promethazine (PHENERGAN) tablet 25 mg Patient Transfer   • labetalol (NORMODYNE,TRANDATE) injection 5 mg Patient Transfer   • ePHEDrine injection 5 mg Patient Transfer   • diphenhydrAMINE (BENADRYL) injection 12.5 mg Patient Transfer   • diphenhydrAMINE (BENADRYL) capsule 25 mg Patient Transfer   • cefTRIAXone (ROCEPHIN) 2 g in sodium chloride 0.9 % 100 mL IVPB        Assessment/Plan         Right renal stone        Plan   - safe for transfer from ICU  - plan voiding trial tomorrow    Jr Rodriguez Jr., MD  21  06:28 EDT        Electronically signed by Jr Rodriguez Jr., MD at 21 0629          Consult Notes      Remy Bowens MD at 21 1848      Consult Orders    1. Urology (on-call MD unless specified) [[id number]] ordered by Joanne Lantigua APRN at 21 1901                    FIRST UROLOGY CONSULT      Patient Identification:  NAME:  Mehnaz Denney  Age:  60 y.o.   Sex:  female   :  1960   MRN:  [id number]       Chief complaint/Reason for consult: Sepsis and obstructing stone    History of present illness:  60 y.o. female denies history of stones came to the ER for acute onset flank pain and worsening fatigue.  She was found to have a large right UPJ stone and sepsis.  She was admitted to the ICU.  No fevers but was tachycardic with lactic acidosis, pyuria and leukocytosis.      Past medical history:  Past Medical History:   Diagnosis Date   • History of anemia    • Hyperlipidemia    • Hypertension    • PONV (postoperative nausea and vomiting)    • Post-menopause bleeding    • Uterine fibroid        Past surgical history:  Past Surgical History:   Procedure Laterality Date   •  SECTION      TWINS   • D & C WITH SUCTION     • D & C WITH SUCTION     • LAPAROSCOPIC TUBAL LIGATION         Allergies:  Patient has no  known allergies.    Home medications:  Medications Prior to Admission   Medication Sig Dispense Refill Last Dose   • amLODIPine (NORVASC) 2.5 MG tablet Take 1 tablet by mouth Daily. 90 tablet 3 3/17/2021 at Unknown time   • ferrous sulfate 325 (65 FE) MG tablet Take 1 tablet by mouth Daily With Breakfast. 90 tablet 1 3/17/2021 at Unknown time   • Multiple Vitamin (MULTI-VITAMIN DAILY PO) Take  by mouth Daily.   3/17/2021 at Unknown time        Hospital medications:  potassium chloride, 10 mEq, Intravenous, Once  potassium chloride, 10 mEq, Intravenous, Once  [START ON 3/19/2021] potassium chloride, 10 mEq, Intravenous, Once  [START ON 3/19/2021] potassium chloride, 10 mEq, Intravenous, Once      Pharmacy Consult,       •  iothalamate  •  Pharmacy Consult  •  [COMPLETED] Insert peripheral IV **AND** [MAR Hold] sodium chloride  •  sterile water    Family history:  Family History   Adopted: Yes   Problem Relation Age of Onset   • Hypertension Mother    • Hypertension Father    • No Known Problems Daughter    • No Known Problems Daughter    • Malig Hyperthermia Neg Hx        Social history:  Social History     Tobacco Use   • Smoking status: Never Smoker   • Smokeless tobacco: Never Used   Substance Use Topics   • Alcohol use: Yes     Alcohol/week: 1.0 standard drinks     Types: 1 Glasses of wine per week     Comment: Occ//Caffeine use: 2-3 cups daily   • Drug use: Defer       REVIEW OF SYSTEMS:  Constitutional - letthargy  Eyes/Ears/Nose/Mouth/Throat - Negative for  changes in vision or hearing  Cardiovascular - Negative for  chest pain,  dysrhythmia  Respiratory - Negative for  dyspnea or cough  Gastrointestinal - Negative for  nausea or vomiting  Genitourinary - Negative for  dysuria or hematuria  Hematologic/Lymphatic - Negative for bruising or edema  Skin - Negative for  erythema or rash  Psych - Negative     Objective:  TMax 24 hours:   Temp (24hrs), Av.3 °F (36.8 °C), Min:97.8 °F (36.6 °C), Max:98.7 °F (37.1  °C)      Vitals Ranges:   Temp:  [97.8 °F (36.6 °C)-98.7 °F (37.1 °C)] 98.7 °F (37.1 °C)  Heart Rate:  [] 96  Resp:  [16-24] 16  BP: ()/(50-71) 110/68    Intake/Output Last 3 shifts:  I/O last 3 completed shifts:  In: 4326 [IV Piggyback:4326]  Out: -      Physical Exam:    General Appearance:    Alert, cooperative, NAD   HEENT:    No trauma, pupils reactive, hearing intact   Back:     No CVA tenderness, no masses   Lungs:     Respirations unlabored, no wheezing    Heart:    No cyanosis, No significant edema   Abdomen:     Soft, NDNT, no masses, no guarding   :    No suprapubic distention   Extremities:   No edema, no deformity   Lymphatic:   No neck or groin LAD   Skin:   No bleeding, bruising or rashes   Neuro/Psych:   Orientation intact, mood/affect pleasant, no focal findings       Results review:   I reviewed the patient's new clinical results.    Data review:  Lab Results (last 24 hours)     Procedure Component Value Units Date/Time    Timed Lactic Acid, Reflex [[id number]] Collected: 03/18/21 2306    Specimen: Blood Updated: 03/18/21 2323    POC Glucose Once [[id number]]  (Normal) Collected: 03/18/21 2202    Specimen: Blood Updated: 03/18/21 2204     Glucose 123 mg/dL     COVID PRE-OP / PRE-PROCEDURE SCREENING ORDER (NO ISOLATION) - Swab, Nasopharynx [[id number]]  (Normal) Collected: 03/18/21 1907    Specimen: Swab from Nasopharynx Updated: 03/18/21 1959    Narrative:      The following orders were created for panel order COVID PRE-OP / PRE-PROCEDURE SCREENING ORDER (NO ISOLATION) - Swab, Nasopharynx.  Procedure                               Abnormality         Status                     ---------                               -----------         ------                     COVID-19,[facility]-HOUSE...[[id number]]  Normal              Final result                 Please view results for these tests on the individual orders.    COVID-19,[facility] IN-HOUSE CEPHEID, NP SWAB IN TRANSPORT MEDIA 8-12 HR TAT -  Swab, Nasopharynx [[id number]]  (Normal) Collected: 03/18/21 1907    Specimen: Swab from Nasopharynx Updated: 03/18/21 1959     COVID19 Not Detected    Narrative:      Fact sheet for providers: https://www.fda.gov/media/510574/download     Fact sheet for patients: https://www.fda.gov/media/797649/download    Urinalysis, Microscopic Only - Urine, Clean Catch [[id number]]  (Abnormal) Collected: 03/18/21 1829    Specimen: Urine, Clean Catch Updated: 03/18/21 1927     RBC, UA       Unable to determine due to loaded field     /HPF     WBC, UA Too Numerous to Count /HPF      Bacteria, UA       Unable to determine due to loaded field     /HPF     Squamous Epithelial Cells, UA       Unable to determine due to loaded field     /HPF     Hyaline Casts, UA       Unable to determine due to loaded field     /LPF     Methodology Manual Light Microscopy    Urinalysis With Microscopic If Indicated (No Culture) - Urine, Clean Catch [[id number]]  (Abnormal) Collected: 03/18/21 1829    Specimen: Urine, Clean Catch Updated: 03/18/21 1914     Color, UA Dark Yellow     Appearance, UA Turbid     pH, UA 5.5     Specific Gravity, UA 1.013     Glucose, UA Negative     Ketones, UA Negative     Bilirubin, UA Negative     Blood, UA Large (3+)     Protein,  mg/dL (2+)     Leuk Esterase, UA Large (3+)     Nitrite, UA Negative     Urobilinogen, UA 1.0 E.U./dL    Procalcitonin [[id number]]  (Abnormal) Collected: 03/18/21 1658    Specimen: Blood Updated: 03/18/21 1841     Procalcitonin 190.76 ng/mL     Narrative:      As a Marker for Sepsis (Non-Neonates):   1. <0.5 ng/mL represents a low risk of severe sepsis and/or septic shock.  1. >2 ng/mL represents a high risk of severe sepsis and/or septic shock.    As a Marker for Lower Respiratory Tract Infections that require antibiotic therapy:  PCT on Admission     Antibiotic Therapy             6-12 Hrs later  > 0.5                Strongly Recommended            >0.25 - <0.5         Recommended  0.1 -  "0.25           Discouraged                   Remeasure/reassess PCT  <0.1                 Strongly Discouraged          Remeasure/reassess PCT      As 28 day mortality risk marker: \"Change in Procalcitonin Result\" (> 80 % or <=80 %) if Day 0 (or Day 1) and Day 4 values are available. Refer to [email or web address]   Change in PCT <=80 %   A decrease of PCT levels below or equal to 80 % defines a positive change in PCT test result representing a higher risk for 28-day all-cause mortality of patients diagnosed with severe sepsis or septic shock.  Change in PCT > 80 %   A decrease of PCT levels of more than 80 % defines a negative change in PCT result representing a lower risk for 28-day all-cause mortality of patients diagnosed with severe sepsis or septic shock.                Results may be falsely decreased if patient taking Biotin.     Blood Culture - Blood, Arm, Left [[id number]] Collected: 03/18/21 1807    Specimen: Blood from Arm, Left Updated: 03/18/21 1819    Troponin [[id number]]  (Abnormal) Collected: 03/18/21 1658    Specimen: Blood Updated: 03/18/21 1753     Troponin T 0.360 ng/mL     Narrative:      Troponin T Reference Range:  <= 0.03 ng/mL-   Negative for AMI  >0.03 ng/mL-     Abnormal for myocardial necrosis.  Clinicians would have to utilize clinical acumen, EKG, Troponin and serial changes to determine if it is an Acute Myocardial Infarction or myocardial injury due to an underlying chronic condition.       Results may be falsely decreased if patient taking Biotin.      T4, Free [[id number]]  (Normal) Collected: 03/18/21 1658    Specimen: Blood Updated: 03/18/21 1743     Free T4 1.47 ng/dL     Narrative:      Results may be falsely increased if patient taking Biotin.      BNP [[id number]]  (Abnormal) Collected: 03/18/21 1658    Specimen: Blood Updated: 03/18/21 1743     proBNP 3,659.0 pg/mL     Narrative:      Among patients with dyspnea, NT-proBNP is highly sensitive for the detection " of acute congestive heart failure. In addition NT-proBNP of <300 pg/ml effectively rules out acute congestive heart failure with 99% negative predictive value.    Results may be falsely decreased if patient taking Biotin.      TSH [[id number]]  (Abnormal) Collected: 03/18/21 1658    Specimen: Blood Updated: 03/18/21 1743     TSH 6.380 uIU/mL     CBC & Differential [[id number]]  (Abnormal) Collected: 03/18/21 1659    Specimen: Blood Updated: 03/18/21 1738    Narrative:      The following orders were created for panel order CBC & Differential.  Procedure                               Abnormality         Status                     ---------                               -----------         ------                     CBC Auto Differential[[id number]]        Abnormal            Final result                 Please view results for these tests on the individual orders.    CBC Auto Differential [[id number]]  (Abnormal) Collected: 03/18/21 1659    Specimen: Blood Updated: 03/18/21 1738     WBC 13.41 10*3/mm3      RBC 5.56 10*6/mm3      Hemoglobin 12.8 g/dL      Hematocrit 39.1 %      MCV 70.3 fL      MCH 23.0 pg      MCHC 32.7 g/dL      RDW 15.1 %      RDW-SD 37.1 fl      MPV --     Comment: Unable to calculate        Platelets 80 10*3/mm3     Manual Differential [[id number]]  (Abnormal) Collected: 03/18/21 1659    Specimen: Blood Updated: 03/18/21 1738     Neutrophil % 91.9 %      Lymphocyte % 4.0 %      Monocyte % 3.0 %      Basophil % 1.0 %      Neutrophils Absolute 12.32 10*3/mm3      Lymphocytes Absolute 0.54 10*3/mm3      Monocytes Absolute 0.40 10*3/mm3      Basophils Absolute 0.13 10*3/mm3      Anisocytosis Slight/1+     Hypochromia Slight/1+     Microcytes Slight/1+     WBC Morphology Normal     Platelet Morphology Normal    Comprehensive Metabolic Panel [[id number]]  (Abnormal) Collected: 03/18/21 1658    Specimen: Blood Updated: 03/18/21 1737     Glucose 129 mg/dL      BUN 38 mg/dL      Creatinine 2.10 mg/dL      Sodium  136 mmol/L      Potassium 2.7 mmol/L      Chloride 99 mmol/L      CO2 20.5 mmol/L      Calcium 9.9 mg/dL      Total Protein 6.7 g/dL      Albumin 2.90 g/dL      ALT (SGPT) 29 U/L      AST (SGOT) 42 U/L      Alkaline Phosphatase 203 U/L      Total Bilirubin 1.7 mg/dL      eGFR  African Amer 29 mL/min/1.73      Globulin 3.8 gm/dL      A/G Ratio 0.8 g/dL      BUN/Creatinine Ratio 18.1     Anion Gap 16.5 mmol/L     Narrative:      GFR Normal >60  Chronic Kidney Disease <60  Kidney Failure <15      Lipase [[id number]]  (Normal) Collected: 03/18/21 1658    Specimen: Blood Updated: 03/18/21 1737     Lipase 13 U/L     Magnesium [[id number]]  (Normal) Collected: 03/18/21 1658    Specimen: Blood Updated: 03/18/21 1737     Magnesium 1.8 mg/dL     Lactic Acid, Plasma [[id number]]  (Abnormal) Collected: 03/18/21 1711    Specimen: Blood Updated: 03/18/21 1736     Lactate 4.3 mmol/L     Protime-INR [[id number]]  (Abnormal) Collected: 03/18/21 1658    Specimen: Blood Updated: 03/18/21 1720     Protime 14.1 Seconds      INR 1.11    Blood Culture - Blood, Arm, Left [[id number]] Collected: 03/18/21 1711    Specimen: Blood from Arm, Left Updated: 03/18/21 1715           Imaging:  Imaging Results (Last 24 Hours)     Procedure Component Value Units Date/Time    CT Abdomen Pelvis Without Contrast [[id number]] Collected: 03/18/21 1905     Updated: 03/18/21 1950    Narrative:      CT ABDOMEN PELVIS WO CONTRAST-     Radiation dose reduction techniques were utilized, including automated  exposure control and exposure modulation based on body size.     CLINICAL: Vague abdominal pain, question sepsis.     COMPARISON: None.     FINDINGS: There is right-sided hydronephrosis and located just below the  right UPJ within the proximal ureter there is a 17 x 12 mm obstructing  calculus. There is a nominal amount of perinephric fat stranding along  the inferior pole of the right kidney. The right kidney appears enlarged  compared to the left. Within the  right kidney there are 2 mm size  calculi. The right renal contour is normal. Diameter of the right ureter  is normal below the calculus. The bladder is collapsed. A 6 mm  left-sided renal calculus, the left kidney is otherwise normal, no  left-sided hydronephrosis.     Gross enlargement of the uterus with multiple fibroids, the largest is  calcified measuring 6.6 cm.     The liver, gallbladder, pancreas, spleen and adrenal glands are  satisfactory in appearance, the diameter of the aorta is normal.  Retroaortic left renal vein noted, anatomic variant. No large or small  bowel abnormality seen. The appendix is normal.     CONCLUSION: Sizable, 17 x 12 mm calculus within the proximal right  ureter just below the UPJ causing hydronephrosis. Grossly in the arch  uterus with several fibroids noted.     Findings of this report called to Joanne Lantigua in the emergency room at  the time of completion, 7 PM.        This report was finalized on 3/18/2021 7:47 PM by Dr. Garth Mcfadden M.D.       XR Chest 1 View [[id number]] Collected: 03/18/21 1711     Updated: 03/18/21 1722    Narrative:      XR CHEST 1 VW-  03/18/2021     HISTORY: Shortness of breath.     Heart size is within normal limits. Lungs appear free of acute  infiltrates. Bones and soft tissues are unremarkable.       Impression:      No acute process.     This report was finalized on 3/18/2021 5:19 PM by Dr. Edwardo Mera M.D.                Assessment:       Right renal stone    17 mm right UPJ stone  Sepsis  6 mm left renal stone    Plan:     Last day to 2 PM, Covid negative  Proceed to the OR for stent placement  Got Vanco and Zosyn  Discussed need for secondary procedures for stone management as outpatient  All risks, benefits and alternatives to surgery were discussed with the patient pre-operatively including but not limited to need for multiple procedures, infection, sepsis, bleeding, risk of anesthesia and damage to other organs. The details of the  procedure have been fully reviewed with the patient and informed consent has been obtained.      Remy Bowens MD  [facility]  Formerly [facility], [address]  [place] [id number] [phone]  03/18/21  23:28 EDT       Electronically signed by Remy Bowens MD at 03/18/21 3529

## 2021-03-19 NOTE — H&P
Group: [facility]         Critical care admission note    Patient Identification:  Mehnaz Denney  60 y.o.  female  1960  [id number]                CC: Right flank pain, fatigue    History of Present Illness:  60-year-old -American female who presents for right-sided flank pain.  She has been feeling fatigued since last week Thursday.  The severe fatigue and generalized weakness has worsened gradually over the last week.  Associated with a few episodes of diarrhea recently.  Associated with right-sided flank pain.  Nothing alleviates it, nothing in particular worsens it.  Denies any dysuria, nausea or vomiting.  No vaginal bleeding.  I discussed the case with Joanne Lantigua, nurse practitioner in the emergency room.  No outside records available for review in this hospital system.    Review of Systems   Constitutional: Positive for fatigue. Negative for diaphoresis and fever.   HENT: Negative for ear discharge and sore throat.    Eyes: Negative for pain and visual disturbance.   Respiratory: Negative for cough and shortness of breath.    Cardiovascular: Negative for chest pain and leg swelling.   Gastrointestinal: Negative for abdominal pain and diarrhea.   Endocrine: Negative for cold intolerance and polyuria.   Genitourinary: Positive for flank pain. Negative for dysuria and hematuria.   Musculoskeletal: Negative for joint swelling and myalgias.   Skin: Negative for rash and wound.   Neurological: Positive for weakness. Negative for speech difficulty and numbness.   Hematological: Negative for adenopathy. Does not bruise/bleed easily.   Psychiatric/Behavioral: Negative for agitation and confusion.       Past Medical History:  Past Medical History:   Diagnosis Date   • History of anemia    • Hyperlipidemia    • Hypertension    • PONV (postoperative nausea and vomiting)    • Post-menopause bleeding    • Uterine fibroid        Past Surgical History:  Past Surgical History:   Procedure  "Laterality Date   •  SECTION      TWINS   • D & C WITH SUCTION     • D & C WITH SUCTION     • LAPAROSCOPIC TUBAL LIGATION          Home Meds:  (Not in a hospital admission)      Allergies:  No Known Allergies    Social History:   Social History     Socioeconomic History   • Marital status: Single     Spouse name: Not on file   • Number of children: Not on file   • Years of education: Not on file   • Highest education level: Not on file   Tobacco Use   • Smoking status: Never Smoker   • Smokeless tobacco: Never Used   Substance and Sexual Activity   • Alcohol use: Yes     Alcohol/week: 1.0 standard drinks     Types: 1 Glasses of wine per week     Comment: Occ//Caffeine use: 2-3 cups daily   • Drug use: Defer   • Sexual activity: Defer     Partners: Male     Birth control/protection: Tubal ligation       Family History:  Family History   Adopted: Yes   Problem Relation Age of Onset   • Hypertension Mother    • Hypertension Father    • No Known Problems Daughter    • No Known Problems Daughter    • Malig Hyperthermia Neg Hx        Physical Exam:  /66   Pulse 101   Temp 97.8 °F (36.6 °C) (Tympanic)   Resp 16   Ht 180.3 cm (71\")   Wt 72.1 kg (159 lb)   LMP  (LMP Unknown)   SpO2 98%   BMI 22.18 kg/m²  Body mass index is 22.18 kg/m². 98% 72.1 kg (159 lb)  Physical Exam  Constitutional:       Appearance: Normal appearance.   HENT:      Right Ear: External ear normal.      Left Ear: External ear normal.      Nose: Nose normal.   Eyes:      Conjunctiva/sclera: Conjunctivae normal.      Pupils: Pupils are equal, round, and reactive to light.   Neck:      Thyroid: No thyromegaly.      Vascular: No JVD.      Trachea: No tracheal deviation.   Cardiovascular:      Rate and Rhythm: Normal rate and regular rhythm.      Heart sounds: Normal heart sounds. No murmur heard.     Pulmonary:      Effort: Pulmonary effort is normal.      Breath sounds: Normal breath sounds.   Abdominal:      Comments: " Tenderness to palpation over the right flank and costal spinal angle posteriorly.  Her abdomen is soft with some mild tenderness over the right side on the palpation.  No rebound.  No distention   Musculoskeletal:         General: No deformity. Normal range of motion.      Cervical back: Neck supple. No rigidity.   Skin:     General: Skin is warm.      Findings: No rash.      Comments: No palpable nodules   Neurological:      General: No focal deficit present.      Mental Status: She is alert and oriented to person, place, and time.      Cranial Nerves: No cranial nerve deficit.      Sensory: No sensory deficit.   Psychiatric:         Mood and Affect: Mood normal.         Thought Content: Thought content normal.         LABS:  COVID19   Date Value Ref Range Status   03/18/2021 Not Detected Not Detected - Ref. Range Final       Lab Results   Component Value Date    CALCIUM 9.9 03/18/2021     Results from last 7 days   Lab Units 03/18/21  1659 03/18/21  1658   MAGNESIUM mg/dL  --  1.8   SODIUM mmol/L  --  136   POTASSIUM mmol/L  --  2.7*   CHLORIDE mmol/L  --  99   CO2 mmol/L  --  20.5*   BUN mg/dL  --  38*   CREATININE mg/dL  --  2.10*   GLUCOSE mg/dL  --  129*   CALCIUM mg/dL  --  9.9   WBC 10*3/mm3 13.41*  --    HEMOGLOBIN g/dL 12.8  --    PLATELETS 10*3/mm3 80*  --    ALT (SGPT) U/L  --  29   AST (SGOT) U/L  --  42*   PROBNP pg/mL  --  3,659.0*   PROCALCITONIN ng/mL  --  190.76*     Lab Results   Component Value Date    TROPONINT 0.360 (C) 03/18/2021     Results from last 7 days   Lab Units 03/18/21  1658   TROPONIN T ng/mL 0.360*         Results from last 7 days   Lab Units 03/18/21  1711 03/18/21  1658   PROCALCITONIN ng/mL  --  190.76*   LACTATE mmol/L 4.3*  --              Results from last 7 days   Lab Units 03/18/21  1658   INR  1.11*         Lab Results   Component Value Date    TSH 6.380 (H) 03/18/2021     Estimated Creatinine Clearance: 32.8 mL/min (A) (by C-G formula based on SCr of 2.1 mg/dL  (H)).  Results from last 7 days   Lab Units 03/18/21  1829   NITRITE UA  Negative   WBC UA /HPF Too Numerous to Count*   BACTERIA UA /HPF Unable to determine due to loaded field*   SQUAM EPITHEL UA /HPF Unable to determine due to loaded field*        Imaging: I personally visualized the images of CT scan of the abdomen and pelvis.  Radiologist interpretation is as follows:  CONCLUSION: Sizable, 17 x 12 mm calculus within the proximal right  ureter just below the UPJ causing hydronephrosis. Grossly in the arch  uterus with several fibroids noted.      Assessment:  Sepsis  Hypokalemia  Urolithiasis  Acute kidney injury  Urinary tract infection, probable pyelonephritis.      Recommendations:  Admit to the intensive care unit.  Patient is septic.  Has extremely elevated procalcitonin.  Start double coverage for possible pyelonephritis with Levaquin and Rocephin.  Prefer not to use vancomycin or Zosyn due to potential for worsening renal injury from these antibiotics.  Give large volume IV fluid bolus.  Consult urology.  Patient may need urologic intervention and stone extraction from ureter tonight.  Monitor urine output closely.  Start IV Levophed if patient becomes hypotensive after fluid boluses completed.    Total critical care time 38 minutes, excluding any separately billable procedure time    Froylan Castillo MD  3/18/2021  20:52 EDT      Much of this encounter note is an electronic transcription/translation of spoken language to printed text using Dragon Software.

## 2021-03-19 NOTE — ANESTHESIA POSTPROCEDURE EVALUATION
"Patient: Mehnaz Denney    Procedure Summary     Date: 03/18/21 Room / Location: [facility] OR 01 / [facility] MAIN OR    Anesthesia Start: 2342 Anesthesia Stop: 03/19/21 0022    Procedure: CYSTOSCOPY, RIGHT  URETERAL STENT INSERTION, AND RUSH CATHETER PLACEMENT (Right ) Diagnosis:     Surgeons: Remy Bowens MD Provider: Jean-Paul Kruger MD    Anesthesia Type: general ASA Status: 3 - Emergent          Anesthesia Type: general    Vitals  Vitals Value Taken Time   /68 03/19/21 0045   Temp 37 °C (98.6 °F) 03/19/21 0020   Pulse 93 03/19/21 0045   Resp 16 03/19/21 0045   SpO2 100 % 03/19/21 0045           Post Anesthesia Care and Evaluation    Patient location during evaluation: bedside  Patient participation: complete - patient participated  Level of consciousness: awake and alert  Pain management: adequate  Airway patency: patent  Anesthetic complications: No anesthetic complications  PONV Status: none  Cardiovascular status: acceptable and hemodynamically stable  Respiratory status: acceptable and spontaneous ventilation  Hydration status: acceptable    Comments: /68   Pulse 93   Temp 37 °C (98.6 °F) (Oral)   Resp 16   Ht 180.3 cm (71\")   Wt 72.1 kg (159 lb)   LMP  (LMP Unknown)   SpO2 100%   BMI 22.18 kg/m²         "

## 2021-03-19 NOTE — CONSULTS
[facility] CONSULT      Patient Identification:  NAME:  Mehnaz Denney  Age:  60 y.o.   Sex:  female   :  1960   MRN:  [id number]       Chief complaint/Reason for consult: Sepsis and obstructing stone    History of present illness:  60 y.o. female denies history of stones came to the ER for acute onset flank pain and worsening fatigue.  She was found to have a large right UPJ stone and sepsis.  She was admitted to the ICU.  No fevers but was tachycardic with lactic acidosis, pyuria and leukocytosis.      Past medical history:  Past Medical History:   Diagnosis Date   • History of anemia    • Hyperlipidemia    • Hypertension    • PONV (postoperative nausea and vomiting)    • Post-menopause bleeding    • Uterine fibroid        Past surgical history:  Past Surgical History:   Procedure Laterality Date   •  SECTION      TWINS   • D & C WITH SUCTION     • D & C WITH SUCTION     • LAPAROSCOPIC TUBAL LIGATION         Allergies:  Patient has no known allergies.    Home medications:  Medications Prior to Admission   Medication Sig Dispense Refill Last Dose   • amLODIPine (NORVASC) 2.5 MG tablet Take 1 tablet by mouth Daily. 90 tablet 3 3/17/2021 at Unknown time   • ferrous sulfate 325 (65 FE) MG tablet Take 1 tablet by mouth Daily With Breakfast. 90 tablet 1 3/17/2021 at Unknown time   • Multiple Vitamin (MULTI-VITAMIN DAILY PO) Take  by mouth Daily.   3/17/2021 at Unknown time        Hospital medications:  potassium chloride, 10 mEq, Intravenous, Once  potassium chloride, 10 mEq, Intravenous, Once  [START ON 3/19/2021] potassium chloride, 10 mEq, Intravenous, Once  [START ON 3/19/2021] potassium chloride, 10 mEq, Intravenous, Once      Pharmacy Consult,       •  iothalamate  •  Pharmacy Consult  •  [COMPLETED] Insert peripheral IV **AND** [MAR Hold] sodium chloride  •  sterile water    Family history:  Family History   Adopted: Yes   Problem Relation Age of Onset   • Hypertension  Mother    • Hypertension Father    • No Known Problems Daughter    • No Known Problems Daughter    • Malig Hyperthermia Neg Hx        Social history:  Social History     Tobacco Use   • Smoking status: Never Smoker   • Smokeless tobacco: Never Used   Substance Use Topics   • Alcohol use: Yes     Alcohol/week: 1.0 standard drinks     Types: 1 Glasses of wine per week     Comment: Occ//Caffeine use: 2-3 cups daily   • Drug use: Defer       REVIEW OF SYSTEMS:  Constitutional - letthargy  Eyes/Ears/Nose/Mouth/Throat - Negative for  changes in vision or hearing  Cardiovascular - Negative for  chest pain,  dysrhythmia  Respiratory - Negative for  dyspnea or cough  Gastrointestinal - Negative for  nausea or vomiting  Genitourinary - Negative for  dysuria or hematuria  Hematologic/Lymphatic - Negative for bruising or edema  Skin - Negative for  erythema or rash  Psych - Negative     Objective:  TMax 24 hours:   Temp (24hrs), Av.3 °F (36.8 °C), Min:97.8 °F (36.6 °C), Max:98.7 °F (37.1 °C)      Vitals Ranges:   Temp:  [97.8 °F (36.6 °C)-98.7 °F (37.1 °C)] 98.7 °F (37.1 °C)  Heart Rate:  [] 96  Resp:  [16-24] 16  BP: ()/(50-71) 110/68    Intake/Output Last 3 shifts:  I/O last 3 completed shifts:  In: 4326 [IV Piggyback:4326]  Out: -      Physical Exam:    General Appearance:    Alert, cooperative, NAD   HEENT:    No trauma, pupils reactive, hearing intact   Back:     No CVA tenderness, no masses   Lungs:     Respirations unlabored, no wheezing    Heart:    No cyanosis, No significant edema   Abdomen:     Soft, NDNT, no masses, no guarding   :    No suprapubic distention   Extremities:   No edema, no deformity   Lymphatic:   No neck or groin LAD   Skin:   No bleeding, bruising or rashes   Neuro/Psych:   Orientation intact, mood/affect pleasant, no focal findings       Results review:   I reviewed the patient's new clinical results.    Data review:  Lab Results (last 24 hours)     Procedure Component Value  Units Date/Time    Timed Lactic Acid, Reflex [[id number]] Collected: 03/18/21 2306    Specimen: Blood Updated: 03/18/21 2323    POC Glucose Once [[id number]]  (Normal) Collected: 03/18/21 2202    Specimen: Blood Updated: 03/18/21 2204     Glucose 123 mg/dL     COVID PRE-OP / PRE-PROCEDURE SCREENING ORDER (NO ISOLATION) - Swab, Nasopharynx [[id number]]  (Normal) Collected: 03/18/21 1907    Specimen: Swab from Nasopharynx Updated: 03/18/21 1959    Narrative:      The following orders were created for panel order COVID PRE-OP / PRE-PROCEDURE SCREENING ORDER (NO ISOLATION) - Swab, Nasopharynx.  Procedure                               Abnormality         Status                     ---------                               -----------         ------                     COVID-19,[facility]...[[id number]]  Normal              Final result                 Please view results for these tests on the individual orders.    COVID-19,[facility] CEPHEID, NP SWAB IN TRANSPORT MEDIA 8-12 HR TAT - Swab, Nasopharynx [[id number]]  (Normal) Collected: 03/18/21 1907    Specimen: Swab from Nasopharynx Updated: 03/18/21 1959     COVID19 Not Detected    Narrative:      Fact sheet for providers: https://www.fda.gov/media/947787/download     Fact sheet for patients: https://www.fda.gov/media/696248/download    Urinalysis, Microscopic Only - Urine, Clean Catch [[id number]]  (Abnormal) Collected: 03/18/21 1829    Specimen: Urine, Clean Catch Updated: 03/18/21 1927     RBC, UA       Unable to determine due to loaded field     /HPF     WBC, UA Too Numerous to Count /HPF      Bacteria, UA       Unable to determine due to loaded field     /HPF     Squamous Epithelial Cells, UA       Unable to determine due to loaded field     /HPF     Hyaline Casts, UA       Unable to determine due to loaded field     /LPF     Methodology Manual Light Microscopy    Urinalysis With Microscopic If Indicated (No Culture) - Urine, Clean Catch [[id number]]  (Abnormal)  "Collected: 03/18/21 1829    Specimen: Urine, Clean Catch Updated: 03/18/21 1914     Color, UA Dark Yellow     Appearance, UA Turbid     pH, UA 5.5     Specific Gravity, UA 1.013     Glucose, UA Negative     Ketones, UA Negative     Bilirubin, UA Negative     Blood, UA Large (3+)     Protein,  mg/dL (2+)     Leuk Esterase, UA Large (3+)     Nitrite, UA Negative     Urobilinogen, UA 1.0 E.U./dL    Procalcitonin [[id number]]  (Abnormal) Collected: 03/18/21 1658    Specimen: Blood Updated: 03/18/21 1841     Procalcitonin 190.76 ng/mL     Narrative:      As a Marker for Sepsis (Non-Neonates):   1. <0.5 ng/mL represents a low risk of severe sepsis and/or septic shock.  1. >2 ng/mL represents a high risk of severe sepsis and/or septic shock.    As a Marker for Lower Respiratory Tract Infections that require antibiotic therapy:  PCT on Admission     Antibiotic Therapy             6-12 Hrs later  > 0.5                Strongly Recommended            >0.25 - <0.5         Recommended  0.1 - 0.25           Discouraged                   Remeasure/reassess PCT  <0.1                 Strongly Discouraged          Remeasure/reassess PCT      As 28 day mortality risk marker: \"Change in Procalcitonin Result\" (> 80 % or <=80 %) if Day 0 (or Day 1) and Day 4 values are available. Refer to http://www.[employer]-pct-calculator.com/   Change in PCT <=80 %   A decrease of PCT levels below or equal to 80 % defines a positive change in PCT test result representing a higher risk for 28-day all-cause mortality of patients diagnosed with severe sepsis or septic shock.  Change in PCT > 80 %   A decrease of PCT levels of more than 80 % defines a negative change in PCT result representing a lower risk for 28-day all-cause mortality of patients diagnosed with severe sepsis or septic shock.                Results may be falsely decreased if patient taking Biotin.     Blood Culture - Blood, Arm, Left [[id number]] Collected: 03/18/21 1807    Specimen: " Blood from Arm, Left Updated: 03/18/21 1819    Troponin [[id number]]  (Abnormal) Collected: 03/18/21 1658    Specimen: Blood Updated: 03/18/21 1753     Troponin T 0.360 ng/mL     Narrative:      Troponin T Reference Range:  <= 0.03 ng/mL-   Negative for AMI  >0.03 ng/mL-     Abnormal for myocardial necrosis.  Clinicians would have to utilize clinical acumen, EKG, Troponin and serial changes to determine if it is an Acute Myocardial Infarction or myocardial injury due to an underlying chronic condition.       Results may be falsely decreased if patient taking Biotin.      T4, Free [[id number]]  (Normal) Collected: 03/18/21 1658    Specimen: Blood Updated: 03/18/21 1743     Free T4 1.47 ng/dL     Narrative:      Results may be falsely increased if patient taking Biotin.      BNP [[id number]]  (Abnormal) Collected: 03/18/21 1658    Specimen: Blood Updated: 03/18/21 1743     proBNP 3,659.0 pg/mL     Narrative:      Among patients with dyspnea, NT-proBNP is highly sensitive for the detection of acute congestive heart failure. In addition NT-proBNP of <300 pg/ml effectively rules out acute congestive heart failure with 99% negative predictive value.    Results may be falsely decreased if patient taking Biotin.      TSH [[id number]]  (Abnormal) Collected: 03/18/21 1658    Specimen: Blood Updated: 03/18/21 1743     TSH 6.380 uIU/mL     CBC & Differential [[id number]]  (Abnormal) Collected: 03/18/21 1659    Specimen: Blood Updated: 03/18/21 1738    Narrative:      The following orders were created for panel order CBC & Differential.  Procedure                               Abnormality         Status                     ---------                               -----------         ------                     CBC Auto Differential[[id number]]        Abnormal            Final result                 Please view results for these tests on the individual orders.    CBC Auto Differential [[id number]]  (Abnormal) Collected: 03/18/21 1659     Specimen: Blood Updated: 03/18/21 1738     WBC 13.41 10*3/mm3      RBC 5.56 10*6/mm3      Hemoglobin 12.8 g/dL      Hematocrit 39.1 %      MCV 70.3 fL      MCH 23.0 pg      MCHC 32.7 g/dL      RDW 15.1 %      RDW-SD 37.1 fl      MPV --     Comment: Unable to calculate        Platelets 80 10*3/mm3     Manual Differential [[id number]]  (Abnormal) Collected: 03/18/21 1659    Specimen: Blood Updated: 03/18/21 1738     Neutrophil % 91.9 %      Lymphocyte % 4.0 %      Monocyte % 3.0 %      Basophil % 1.0 %      Neutrophils Absolute 12.32 10*3/mm3      Lymphocytes Absolute 0.54 10*3/mm3      Monocytes Absolute 0.40 10*3/mm3      Basophils Absolute 0.13 10*3/mm3      Anisocytosis Slight/1+     Hypochromia Slight/1+     Microcytes Slight/1+     WBC Morphology Normal     Platelet Morphology Normal    Comprehensive Metabolic Panel [[id number]]  (Abnormal) Collected: 03/18/21 1658    Specimen: Blood Updated: 03/18/21 1737     Glucose 129 mg/dL      BUN 38 mg/dL      Creatinine 2.10 mg/dL      Sodium 136 mmol/L      Potassium 2.7 mmol/L      Chloride 99 mmol/L      CO2 20.5 mmol/L      Calcium 9.9 mg/dL      Total Protein 6.7 g/dL      Albumin 2.90 g/dL      ALT (SGPT) 29 U/L      AST (SGOT) 42 U/L      Alkaline Phosphatase 203 U/L      Total Bilirubin 1.7 mg/dL      eGFR  African Amer 29 mL/min/1.73      Globulin 3.8 gm/dL      A/G Ratio 0.8 g/dL      BUN/Creatinine Ratio 18.1     Anion Gap 16.5 mmol/L     Narrative:      GFR Normal >60  Chronic Kidney Disease <60  Kidney Failure <15      Lipase [[id number]]  (Normal) Collected: 03/18/21 1658    Specimen: Blood Updated: 03/18/21 1737     Lipase 13 U/L     Magnesium [[id number]]  (Normal) Collected: 03/18/21 1658    Specimen: Blood Updated: 03/18/21 1737     Magnesium 1.8 mg/dL     Lactic Acid, Plasma [[id number]]  (Abnormal) Collected: 03/18/21 1711    Specimen: Blood Updated: 03/18/21 1736     Lactate 4.3 mmol/L     Protime-INR [[id number]]  (Abnormal) Collected: 03/18/21 0026     Specimen: Blood Updated: 03/18/21 1720     Protime 14.1 Seconds      INR 1.11    Blood Culture - Blood, Arm, Left [[id number]] Collected: 03/18/21 1711    Specimen: Blood from Arm, Left Updated: 03/18/21 1715           Imaging:  Imaging Results (Last 24 Hours)     Procedure Component Value Units Date/Time    CT Abdomen Pelvis Without Contrast [[id number]] Collected: 03/18/21 1905     Updated: 03/18/21 1950    Narrative:      CT ABDOMEN PELVIS WO CONTRAST-     Radiation dose reduction techniques were utilized, including automated  exposure control and exposure modulation based on body size.     CLINICAL: Vague abdominal pain, question sepsis.     COMPARISON: None.     FINDINGS: There is right-sided hydronephrosis and located just below the  right UPJ within the proximal ureter there is a 17 x 12 mm obstructing  calculus. There is a nominal amount of perinephric fat stranding along  the inferior pole of the right kidney. The right kidney appears enlarged  compared to the left. Within the right kidney there are 2 mm size  calculi. The right renal contour is normal. Diameter of the right ureter  is normal below the calculus. The bladder is collapsed. A 6 mm  left-sided renal calculus, the left kidney is otherwise normal, no  left-sided hydronephrosis.     Gross enlargement of the uterus with multiple fibroids, the largest is  calcified measuring 6.6 cm.     The liver, gallbladder, pancreas, spleen and adrenal glands are  satisfactory in appearance, the diameter of the aorta is normal.  Retroaortic left renal vein noted, anatomic variant. No large or small  bowel abnormality seen. The appendix is normal.     CONCLUSION: Sizable, 17 x 12 mm calculus within the proximal right  ureter just below the UPJ causing hydronephrosis. Grossly in the arch  uterus with several fibroids noted.     Findings of this report called to Joanne Lantigua in the emergency room at  the time of completion, 7 PM.        This report was finalized on  3/18/2021 7:47 PM by Dr. Garth Mcfadden M.D.       XR Chest 1 View [[id number]] Collected: 03/18/21 1711     Updated: 03/18/21 1722    Narrative:      XR CHEST 1 VW-  03/18/2021     HISTORY: Shortness of breath.     Heart size is within normal limits. Lungs appear free of acute  infiltrates. Bones and soft tissues are unremarkable.       Impression:      No acute process.     This report was finalized on 3/18/2021 5:19 PM by Dr. Edwardo Mera M.D.                Assessment:       Right renal stone    17 mm right UPJ stone  Sepsis  6 mm left renal stone    Plan:     Last day to 2 PM, Covid negative  Proceed to the OR for stent placement  Got Vanco and Zosyn  Discussed need for secondary procedures for stone management as outpatient  All risks, benefits and alternatives to surgery were discussed with the patient pre-operatively including but not limited to need for multiple procedures, infection, sepsis, bleeding, risk of anesthesia and damage to other organs. The details of the procedure have been fully reviewed with the patient and informed consent has been obtained.      Remy Bowens MD  [facility]  [facility], [address]  [place] [address]  [phone]  03/18/21  23:28 EDT

## 2021-03-19 NOTE — NURSING NOTE
Pt admitted to ICU. Pt was taken to OR for right ureteral stent placement. Carvalho catheter placed in OR. Cultures obtained. VSS at this time. On room air. AOX4. No complaints of pain. Multiple antibiotics given. K replaced-Awaiting AM labs. Regular diet ordered. SCD in place. Continue to monitor.

## 2021-03-19 NOTE — ANESTHESIA PROCEDURE NOTES
Airway  Urgency: elective    Date/Time: 3/18/2021 11:46 PM    General Information and Staff    Patient location during procedure: OR  Anesthesiologist: Jean-Paul Kruger MD  CRNA: Naif Wyatt CRNA    Indications and Patient Condition  Indications for airway management: airway protection    Preoxygenated: yes  MILS maintained throughout  Mask difficulty assessment: 1 - vent by mask    Final Airway Details  Final airway type: endotracheal airway      Successful airway: ETT    Successful intubation technique: direct laryngoscopy and video laryngoscopy  Blade: CMAC  Blade size: 3  ETT size (mm): 7.0  Cormack-Lehane Classification: grade IIa - partial view of glottis  Placement verified by: chest auscultation   Number of attempts at approach: 1  Assessment: lips, teeth, and gum same as pre-op and atraumatic intubation    Additional Comments  Pt has anterior airway and a couple loose teeth.  Was unable to obtain a view with MAC 3.  Used CMAC and obtained a grade II view with cricoid pressure.

## 2021-03-19 NOTE — PROGRESS NOTES
"   LOS: 0 days   Patient Care Team:  Efrme Cantrell MD as PCP - General (Internal Medicine)  Mabel Soto MD as Consulting Physician (Obstetrics and Gynecology)      Subjective   Interval History: pain much improved.    Objective     ROS   12 POINT NEG ROS PERTINENT IN HPI      Vital Signs  Temp:  [97.8 °F (36.6 °C)-99.1 °F (37.3 °C)] 98.4 °F (36.9 °C)  Heart Rate:  [] 84  Resp:  [16-24] 16  BP: ()/(50-76) 113/69      Intake/Output Summary (Last 24 hours) at 3/19/2021 0628  Last data filed at 3/19/2021 0444  Gross per 24 hour   Intake 6936 ml   Output 725 ml   Net 6211 ml       Flowsheet Rows      First Filed Value   Admission Height  180.3 cm (71\") Documented at 03/18/2021 1704   Admission Weight  72.1 kg (159 lb) Documented at 03/18/2021 1704          Physical Exam:     General salvatore: alert, cooperative, oriented  Skin:  Skin color, texture, turgor normal, no rashes        Results Review:     I reviewed the patient's new clinical results.  Lab Results (all)     Procedure Component Value Units Date/Time    POC Glucose Once [[id number]]  (Normal) Collected: 03/19/21 0610    Specimen: Blood Updated: 03/19/21 0613     Glucose 117 mg/dL     CBC (No Diff) [[id number]]  (Abnormal) Collected: 03/19/21 0546    Specimen: Blood Updated: 03/19/21 0611     WBC 22.69 10*3/mm3      RBC 3.99 10*6/mm3      Hemoglobin 9.6 g/dL      Hematocrit 28.4 %      MCV 71.2 fL      MCH 24.1 pg      MCHC 33.8 g/dL      RDW 15.0 %      RDW-SD 37.8 fl      MPV --     Comment: Unable to calculate        Platelets 64 10*3/mm3     Basic Metabolic Panel [[id number]] Collected: 03/19/21 0546    Specimen: Blood Updated: 03/19/21 0555    Troponin [[id number]] Collected: 03/19/21 0546    Specimen: Blood Updated: 03/19/21 0555    Urine Culture - Urine, Kidney, Right [[id number]] Collected: 03/18/21 8557    Specimen: Urine from Kidney, Right Updated: 03/19/21 0025    Timed Lactic Acid, Reflex [[id number]]  (Normal) Collected: 03/18/21 2306    " Specimen: Blood Updated: 03/18/21 2340     Lactate 1.0 mmol/L     POC Glucose Once [[id number]]  (Normal) Collected: 03/18/21 2202    Specimen: Blood Updated: 03/18/21 2204     Glucose 123 mg/dL     COVID PRE-OP / PRE-PROCEDURE SCREENING ORDER (NO ISOLATION) - Swab, Nasopharynx [[id number]]  (Normal) Collected: 03/18/21 1907    Specimen: Swab from Nasopharynx Updated: 03/18/21 1959    Narrative:      The following orders were created for panel order COVID PRE-OP / PRE-PROCEDURE SCREENING ORDER (NO ISOLATION) - Swab, Nasopharynx.  Procedure                               Abnormality         Status                     ---------                               -----------         ------                     COVID-19,[facility]...[[id number]]  Normal              Final result                 Please view results for these tests on the individual orders.    COVID-19,[facility] CEPHEID, NP SWAB IN TRANSPORT MEDIA 8-12 HR TAT - Swab, Nasopharynx [[id number]]  (Normal) Collected: 03/18/21 1907    Specimen: Swab from Nasopharynx Updated: 03/18/21 1959     COVID19 Not Detected    Narrative:      Fact sheet for providers: https://www.fda.gov/media/189063/download     Fact sheet for patients: https://www.fda.gov/media/783774/download    Urinalysis, Microscopic Only - Urine, Clean Catch [[id number]]  (Abnormal) Collected: 03/18/21 1829    Specimen: Urine, Clean Catch Updated: 03/18/21 1927     RBC, UA       Unable to determine due to loaded field     /HPF     WBC, UA Too Numerous to Count /HPF      Bacteria, UA       Unable to determine due to loaded field     /HPF     Squamous Epithelial Cells, UA       Unable to determine due to loaded field     /HPF     Hyaline Casts, UA       Unable to determine due to loaded field     /LPF     Methodology Manual Light Microscopy    Urinalysis With Microscopic If Indicated (No Culture) - Urine, Clean Catch [[id number]]  (Abnormal) Collected: 03/18/21 1829    Specimen: Urine, Clean Catch  "Updated: 03/18/21 1914     Color, UA Dark Yellow     Appearance, UA Turbid     pH, UA 5.5     Specific Gravity, UA 1.013     Glucose, UA Negative     Ketones, UA Negative     Bilirubin, UA Negative     Blood, UA Large (3+)     Protein,  mg/dL (2+)     Leuk Esterase, UA Large (3+)     Nitrite, UA Negative     Urobilinogen, UA 1.0 E.U./dL    Procalcitonin [[id number]]  (Abnormal) Collected: 03/18/21 1658    Specimen: Blood Updated: 03/18/21 1841     Procalcitonin 190.76 ng/mL     Narrative:      As a Marker for Sepsis (Non-Neonates):   1. <0.5 ng/mL represents a low risk of severe sepsis and/or septic shock.  1. >2 ng/mL represents a high risk of severe sepsis and/or septic shock.    As a Marker for Lower Respiratory Tract Infections that require antibiotic therapy:  PCT on Admission     Antibiotic Therapy             6-12 Hrs later  > 0.5                Strongly Recommended            >0.25 - <0.5         Recommended  0.1 - 0.25           Discouraged                   Remeasure/reassess PCT  <0.1                 Strongly Discouraged          Remeasure/reassess PCT      As 28 day mortality risk marker: \"Change in Procalcitonin Result\" (> 80 % or <=80 %) if Day 0 (or Day 1) and Day 4 values are available. Refer to http://www.[facility]-pct-calculator.com/   Change in PCT <=80 %   A decrease of PCT levels below or equal to 80 % defines a positive change in PCT test result representing a higher risk for 28-day all-cause mortality of patients diagnosed with severe sepsis or septic shock.  Change in PCT > 80 %   A decrease of PCT levels of more than 80 % defines a negative change in PCT result representing a lower risk for 28-day all-cause mortality of patients diagnosed with severe sepsis or septic shock.                Results may be falsely decreased if patient taking Biotin.     Blood Culture - Blood, Arm, Left [[id number]] Collected: 03/18/21 1807    Specimen: Blood from Arm, Left Updated: 03/18/21 1819    Troponin " [[id number]]  (Abnormal) Collected: 03/18/21 1658    Specimen: Blood Updated: 03/18/21 1753     Troponin T 0.360 ng/mL     Narrative:      Troponin T Reference Range:  <= 0.03 ng/mL-   Negative for AMI  >0.03 ng/mL-     Abnormal for myocardial necrosis.  Clinicians would have to utilize clinical acumen, EKG, Troponin and serial changes to determine if it is an Acute Myocardial Infarction or myocardial injury due to an underlying chronic condition.       Results may be falsely decreased if patient taking Biotin.      T4, Free [[id number]]  (Normal) Collected: 03/18/21 1658    Specimen: Blood Updated: 03/18/21 1743     Free T4 1.47 ng/dL     Narrative:      Results may be falsely increased if patient taking Biotin.      BNP [[id number]]  (Abnormal) Collected: 03/18/21 1658    Specimen: Blood Updated: 03/18/21 1743     proBNP 3,659.0 pg/mL     Narrative:      Among patients with dyspnea, NT-proBNP is highly sensitive for the detection of acute congestive heart failure. In addition NT-proBNP of <300 pg/ml effectively rules out acute congestive heart failure with 99% negative predictive value.    Results may be falsely decreased if patient taking Biotin.      TSH [[id number]]  (Abnormal) Collected: 03/18/21 1658    Specimen: Blood Updated: 03/18/21 1743     TSH 6.380 uIU/mL     CBC & Differential [[id number]]  (Abnormal) Collected: 03/18/21 1659    Specimen: Blood Updated: 03/18/21 1738    Narrative:      The following orders were created for panel order CBC & Differential.  Procedure                               Abnormality         Status                     ---------                               -----------         ------                     CBC Auto Differential[[id number]]        Abnormal            Final result                 Please view results for these tests on the individual orders.    CBC Auto Differential [[id number]]  (Abnormal) Collected: 03/18/21 1659    Specimen: Blood Updated: 03/18/21 1738     WBC 13.41  10*3/mm3      RBC 5.56 10*6/mm3      Hemoglobin 12.8 g/dL      Hematocrit 39.1 %      MCV 70.3 fL      MCH 23.0 pg      MCHC 32.7 g/dL      RDW 15.1 %      RDW-SD 37.1 fl      MPV --     Comment: Unable to calculate        Platelets 80 10*3/mm3     Manual Differential [[id number]]  (Abnormal) Collected: 03/18/21 1659    Specimen: Blood Updated: 03/18/21 1738     Neutrophil % 91.9 %      Lymphocyte % 4.0 %      Monocyte % 3.0 %      Basophil % 1.0 %      Neutrophils Absolute 12.32 10*3/mm3      Lymphocytes Absolute 0.54 10*3/mm3      Monocytes Absolute 0.40 10*3/mm3      Basophils Absolute 0.13 10*3/mm3      Anisocytosis Slight/1+     Hypochromia Slight/1+     Microcytes Slight/1+     WBC Morphology Normal     Platelet Morphology Normal    Comprehensive Metabolic Panel [[id number]]  (Abnormal) Collected: 03/18/21 1658    Specimen: Blood Updated: 03/18/21 1737     Glucose 129 mg/dL      BUN 38 mg/dL      Creatinine 2.10 mg/dL      Sodium 136 mmol/L      Potassium 2.7 mmol/L      Chloride 99 mmol/L      CO2 20.5 mmol/L      Calcium 9.9 mg/dL      Total Protein 6.7 g/dL      Albumin 2.90 g/dL      ALT (SGPT) 29 U/L      AST (SGOT) 42 U/L      Alkaline Phosphatase 203 U/L      Total Bilirubin 1.7 mg/dL      eGFR  African Amer 29 mL/min/1.73      Globulin 3.8 gm/dL      A/G Ratio 0.8 g/dL      BUN/Creatinine Ratio 18.1     Anion Gap 16.5 mmol/L     Narrative:      GFR Normal >60  Chronic Kidney Disease <60  Kidney Failure <15      Lipase [[id number]]  (Normal) Collected: 03/18/21 1658    Specimen: Blood Updated: 03/18/21 1737     Lipase 13 U/L     Magnesium [[id number]]  (Normal) Collected: 03/18/21 1658    Specimen: Blood Updated: 03/18/21 1737     Magnesium 1.8 mg/dL     Lactic Acid, Plasma [[id number]]  (Abnormal) Collected: 03/18/21 1711    Specimen: Blood Updated: 03/18/21 1736     Lactate 4.3 mmol/L     Protime-INR [[id number]]  (Abnormal) Collected: 03/18/21 1658    Specimen: Blood Updated: 03/18/21 1720     Protime 14.1  Seconds      INR 1.11    Blood Culture - Blood, Arm, Left [[id number]] Collected: 03/18/21 1711    Specimen: Blood from Arm, Left Updated: 03/18/21 1715          Imaging Results (All)     Procedure Component Value Units Date/Time    XR Pyelogram Retrograde [[id number]] Collected: 03/19/21 0038     Updated: 03/19/21 0038    Narrative:        Patient: CHRISTIAN FRANCES  Time Out: 00:37  Exam(s): FILM OTHER     EXAM:    XR Bone Survey, Complete    CLINICAL HISTORY:     RT STENT INSERTION  Reason for exam: RT STENT INSERTION.    TECHNIQUE:    Multiple views of the bones of the axial and appendicular skeleton.    COMPARISON:    No relevant prior studies available.    FINDINGS:    Bones joints:  No acute findings.  No lytic or blastic lesions.    Soft tissues:  Unremarkable.    Tubes, lines and devices:  Right ureteral stent noted which appears to   be in appropriate position.  Contrast is seen within the right renal   collecting system.  Partially visualized enteric tube is noted.    Other findings:  Calcification seen within the right hemipelvis, which   is nonspecific.    IMPRESSION:         Right ureteral stent noted which appears to be in appropriate position.    Contrast is seen within the right renal collecting system.      Impression:          Electronically signed by Elan Sawant MD on 03-19-21 at 0037    CT Abdomen Pelvis Without Contrast [[id number]] Collected: 03/18/21 1905     Updated: 03/18/21 1950    Narrative:      CT ABDOMEN PELVIS WO CONTRAST-     Radiation dose reduction techniques were utilized, including automated  exposure control and exposure modulation based on body size.     CLINICAL: Vague abdominal pain, question sepsis.     COMPARISON: None.     FINDINGS: There is right-sided hydronephrosis and located just below the  right UPJ within the proximal ureter there is a 17 x 12 mm obstructing  calculus. There is a nominal amount of perinephric fat stranding along  the inferior pole of the right  kidney. The right kidney appears enlarged  compared to the left. Within the right kidney there are 2 mm size  calculi. The right renal contour is normal. Diameter of the right ureter  is normal below the calculus. The bladder is collapsed. A 6 mm  left-sided renal calculus, the left kidney is otherwise normal, no  left-sided hydronephrosis.     Gross enlargement of the uterus with multiple fibroids, the largest is  calcified measuring 6.6 cm.     The liver, gallbladder, pancreas, spleen and adrenal glands are  satisfactory in appearance, the diameter of the aorta is normal.  Retroaortic left renal vein noted, anatomic variant. No large or small  bowel abnormality seen. The appendix is normal.     CONCLUSION: Sizable, 17 x 12 mm calculus within the proximal right  ureter just below the UPJ causing hydronephrosis. Grossly in the arch  uterus with several fibroids noted.     Findings of this report called to oJanne Lantigua in the emergency room at  the time of completion, 7 PM.        This report was finalized on 3/18/2021 7:47 PM by Dr. Garth Mcfadden M.D.       XR Chest 1 View [[id number]] Collected: 03/18/21 1711     Updated: 03/18/21 1722    Narrative:      XR CHEST 1 VW-  03/18/2021     HISTORY: Shortness of breath.     Heart size is within normal limits. Lungs appear free of acute  infiltrates. Bones and soft tissues are unremarkable.       Impression:      No acute process.     This report was finalized on 3/18/2021 5:19 PM by Dr. Edwardo Mera M.D.             Medication Review:   Current Facility-Administered Medications   Medication Dose Route Frequency Provider Last Rate Last Admin   • cefTRIAXone (ROCEPHIN) IVPB 2 g  2 g Intravenous Q24H Froylan Castillo MD   Stopped at 03/19/21 0500   • lactated ringers infusion  9 mL/hr Intravenous Continuous PRN Remy Bowens MD   Stopped at 03/19/21 0152   • levoFLOXacin (LEVAQUIN) 750 mg/150 mL D5W (premix) (LEVAQUIN) 750 mg  750 mg Intravenous Q48H Froylan Castillo,  MD   Stopped at 03/19/21 0613   • Pharmacy Consult   Does not apply Continuous PRN Remy Bowens MD       • sodium chloride 0.9 % flush 10 mL  10 mL Intravenous PRN Remy Bowens MD           Current Facility-Administered Medications:   •  cefTRIAXone (ROCEPHIN) IVPB 2 g, 2 g, Intravenous, Q24H, Froylan Castillo MD, Stopped at 03/19/21 0500  •  lactated ringers infusion, 9 mL/hr, Intravenous, Continuous PRN, Remy Bowens MD, Stopped at 03/19/21 0152  •  levoFLOXacin (LEVAQUIN) 750 mg/150 mL D5W (premix) (LEVAQUIN) 750 mg, 750 mg, Intravenous, Q48H, Froylan Castillo MD, Stopped at 03/19/21 0613  •  Pharmacy Consult, , Does not apply, Continuous PRN, Remy Bowens MD  •  [COMPLETED] Insert peripheral IV, , , Once **AND** sodium chloride 0.9 % flush 10 mL, 10 mL, Intravenous, PRN, Remy Bowens MD  Medications Discontinued During This Encounter   Medication Reason   • sterile water irrigation solution Patient Discharge   • iothalamate (CONRAY) injection Patient Discharge   • sodium chloride 0.9 % flush 10 mL Patient Transfer   • sodium chloride 0.9 % flush 10 mL Patient Transfer   • lidocaine PF 1% (XYLOCAINE) injection 0.5 mL Patient Transfer   • midazolam (VERSED) injection 1 mg Patient Transfer   • midazolam (VERSED) injection 2 mg Patient Transfer   • HYDROcodone-acetaminophen (NORCO) 7.5-325 MG per tablet 1 tablet Patient Transfer   • HYDROmorphone (DILAUDID) injection 0.5 mg Patient Transfer   • oxyCODONE-acetaminophen (PERCOCET) 7.5-325 MG per tablet 1 tablet Patient Transfer   • fentaNYL citrate (PF) (SUBLIMAZE) injection 50 mcg Patient Transfer   • naloxone (NARCAN) injection 0.2 mg Patient Transfer   • flumazenil (ROMAZICON) injection 0.2 mg Patient Transfer   • ondansetron (ZOFRAN) injection 4 mg Patient Transfer   • promethazine (PHENERGAN) suppository 25 mg Patient Transfer   • promethazine (PHENERGAN) tablet 25 mg Patient Transfer   • labetalol (NORMODYNE,TRANDATE) injection 5 mg Patient Transfer      • ePHEDrine injection 5 mg Patient Transfer   • diphenhydrAMINE (BENADRYL) injection 12.5 mg Patient Transfer   • diphenhydrAMINE (BENADRYL) capsule 25 mg Patient Transfer   • cefTRIAXone (ROCEPHIN) 2 g in sodium chloride 0.9 % 100 mL IVPB        Assessment/Plan         Right renal stone        Plan   - safe for transfer from ICU  - plan voiding trial tomorrow    Jr Rodriguez Jr., MD  03/19/21  06:28 EDT

## 2021-03-19 NOTE — OP NOTE
Urology Operative Note    3/18/2021 - 3/19/2021    Mehnaz Denney  60 y.o.  1960  female  [id number]      Surgeon(s) and Role:  Remy Bowens MD - Primary     Pre-operative Diagnosis: 17 mm right UPJ stone, sepsis    Post-operative Diagnosis: Same    Complications: None    Procedures:  1. Cystoscopy  2. Retrograde pyelogram  3. Right ureteral Stent placement  4. Fluoroscopy with Interpretation     Indications   Mehnaz Denney is a 60 y.o. female who was found to have a 17 mm right UPJ stone and was admitted to ICU for sepsis, lactic acidosis and leukocytosis.    During the informed consent process, the procedure was discussed in detail including the risks of bleeding, infection, ureteral injury, failure and need for secondary procedures    Findings   Pollick catheter used to aspirate right renal pelvis for very purulent urine, gentle retrograde pyelogram showed minimal hydronephrosis.  Stent placed    Fluoroscopy with interpretation: Retrograde pyelogram after aspiration of renal pelvis showed decompressed calyces.  Stent placed with partial curl into the upper pole    Description of procedure:  The patient was properly identified in the preoperative holding area and taken to the operating room were general anesthesia was induced. The patient was placed in the cystolithotomy position and prepped and draped in a sterile fashion. The patient was given antibiotics intravenously before the start of the surgery. After ensuring that all of the required equipment was ready and available a surgical timeout was performed.     A 21 Sami rigid cystoscope was inserted into the bladder under direct visualization.   A Sensorwire was passed up the ureter under fluoroscopic guidance.  Pollick catheter was placed over the sensor wire and used to aspirate urine out of the right renal pelvis which was very purulent.  This was sent for culture.  Wire was replaced and a 6Fr x 24 cm stent was placed under direct and  fluoroscopic visualization with curl in the renal pelvis as well as the bladder.  Carvalho was placed    There were no apparent complications. The patient woke up in the operating room and was taken to the recovery room in stable condition.     I was present and scrubbed for the entire procedure.     Specimens: Right renal pelvis aspirated urine for culture    Estimated Blood Loss: minimal      Plan   -Return to ICU  -Follow cultures  -Okay to remove Carvalho once clinically improved  -She will need to follow-up for definitive management of stones         Remy Bowens MD  [facility]  [facility] [facility], [address]  [place] [address] [phone]

## 2021-03-19 NOTE — PROGRESS NOTES
"  PROGRESS NOTE  Patient Name: Mehnaz Denney  Age/Sex: 60 y.o. female  : 1960  MRN: [id number]    Date of Admission: 3/18/2021  Date of Encounter Visit: 21   LOS: 0 days   Patient Care Team:  Efrem Cantrell MD as PCP - General (Internal Medicine)  Mabel Soto MD as Consulting Physician (Obstetrics and Gynecology)    Chief Complaint: Severe sepsis, nephrolithiasis with UTI, status post stenting, acute renal failure    Hospital course: Patient is doing a lot better at this point, her renal function is almost back to normal, her white count is up but expected to start improving since she is doing clinically better, she is on wide spectrum antibiotic awaiting the final culture results on the urine.  She is awake alert on room air, denies any chest pain or shortness of breath or chest tightness or abdominal pain or nausea or vomiting or diarrhea.        REVIEW OF SYSTEMS:   CONSTITUTIONAL: no fever or chills  CARDIOVASCULAR: No chest pain, chest pressure or chest discomfort. No palpitations or edema.   RESPIRATORY: No shortness of breath, cough or sputum.   GASTROINTESTINAL: No anorexia, nausea, vomiting or diarrhea. No abdominal pain or blood.   HEMATOLOGIC: No bleeding or bruising.     Ventilator/Non-Invasive Ventilation Settings (From admission, onward) Comment    None            Vital Signs  Temp:  [97.8 °F (36.6 °C)-99.1 °F (37.3 °C)] 98.3 °F (36.8 °C)  Heart Rate:  [] 79  Resp:  [16-24] 16  BP: ()/(50-76) 112/68  SpO2:  [96 %-100 %] 98 %  on  Flow (L/min):  [2-4] 2 Device (Oxygen Therapy): room air    Intake/Output Summary (Last 24 hours) at 3/19/2021 1142  Last data filed at 3/19/2021 0800  Gross per 24 hour   Intake 6996 ml   Output 725 ml   Net 6271 ml     Flowsheet Rows      First Filed Value   Admission Height  180.3 cm (71\") Documented at 2021 1704   Admission Weight  72.1 kg (159 lb) Documented at 2021 1704        Body mass index is 22.11 kg/m².      " 03/18/21  1704 03/18/21  2140   Weight: 72.1 kg (159 lb) 71.9 kg (158 lb 8.2 oz)       Physical Exam:  GEN:  No acute distress, alert, cooperative, well developed   EYES:   Sclerae clear. No icterus. PERRL. Normal EOM  ENT:   External ears/nose normal, no oral lesions, no thrush, mucous membranes moist  NECK:  Supple, midline trachea, no JVD  LUNGS: Normal chest on inspection, CTAB, no wheezes. No rhonchi. No crackles. Respirations regular, even and unlabored.   CV:  Regular rhythm and rate. Normal S1/S2. No murmurs, gallops, or rubs noted.  ABD:  Soft, n no longer tender to palpation and nondistended. Normal bowel sounds. No guarding  EXT:  Moves all extremities well. No cyanosis. No redness. No edema.   Skin: Dry, intact, no bleeding    Results Review:    Results From Last 14 Days   Lab Units 03/18/21  2306 03/18/21  1711 03/11/21  0901   LACTATE mmol/L 1.0 4.3*  --    TRIGLYCERIDES mg/dL  --   --  59     Results from last 7 days   Lab Units 03/19/21  0546 03/18/21  1658   SODIUM mmol/L 139 136   POTASSIUM mmol/L 4.3 2.7*   CHLORIDE mmol/L 110* 99   CO2 mmol/L 21.9* 20.5*   BUN mg/dL 28* 38*   CREATININE mg/dL 1.16* 2.10*   CALCIUM mg/dL 9.1 9.9   AST (SGOT) U/L  --  42*   ALT (SGPT) U/L  --  29   ANION GAP mmol/L 7.1 16.5*   ALBUMIN g/dL  --  2.90*     Results from last 7 days   Lab Units 03/19/21  0546 03/18/21  1658   TROPONIN T ng/mL 0.092* 0.360*     Results from last 7 days   Lab Units 03/18/21  1658   TSH uIU/mL 6.380*     Results from last 7 days   Lab Units 03/18/21  1658   PROBNP pg/mL 3,659.0*     Results from last 7 days   Lab Units 03/19/21  0546 03/18/21  1659   WBC 10*3/mm3 22.69* 13.41*   HEMOGLOBIN g/dL 9.6* 12.8   HEMATOCRIT % 28.4* 39.1   PLATELETS 10*3/mm3 64* 80*   MCV fL 71.2* 70.3*     Results from last 7 days   Lab Units 03/18/21  1658   INR  1.11*     Results from last 7 days   Lab Units 03/18/21  1658   MAGNESIUM mg/dL 1.8           Invalid input(s): LDLCALC          Glucose   Date/Time  Value Ref Range Status   03/19/2021 0610 117 70 - 130 mg/dL Final   03/18/2021 2202 123 70 - 130 mg/dL Final     Results from last 7 days   Lab Units 03/18/21  2306 03/18/21  1711 03/18/21  1658   PROCALCITONIN ng/mL  --   --  190.76*   LACTATE mmol/L 1.0 4.3*  --          Results from last 7 days   Lab Units 03/18/21  1829   NITRITE UA  Negative   WBC UA /HPF Too Numerous to Count*   BACTERIA UA /HPF Unable to determine due to loaded field*   SQUAM EPITHEL UA /HPF Unable to determine due to loaded field*     Results from last 7 days   Lab Units 03/18/21  1907   COVID19  Not Detected               Imaging:   Imaging Results (All)     Procedure Component Value Units Date/Time    XR Pyelogram Retrograde [[id number]] Collected: 03/19/21 0038     Updated: 03/19/21 0038    Narrative:        Patient: CHRISTIAN FRANCES  Time Out: 00:37  Exam(s): FILM OTHER     EXAM:    XR Bone Survey, Complete    CLINICAL HISTORY:     RT STENT INSERTION  Reason for exam: RT STENT INSERTION.    TECHNIQUE:    Multiple views of the bones of the axial and appendicular skeleton.    COMPARISON:    No relevant prior studies available.    FINDINGS:    Bones joints:  No acute findings.  No lytic or blastic lesions.    Soft tissues:  Unremarkable.    Tubes, lines and devices:  Right ureteral stent noted which appears to   be in appropriate position.  Contrast is seen within the right renal   collecting system.  Partially visualized enteric tube is noted.    Other findings:  Calcification seen within the right hemipelvis, which   is nonspecific.    IMPRESSION:         Right ureteral stent noted which appears to be in appropriate position.    Contrast is seen within the right renal collecting system.      Impression:          Electronically signed by Elan Sawant MD on 03-19-21 at 0037    CT Abdomen Pelvis Without Contrast [[id number]] Collected: 03/18/21 1905     Updated: 03/18/21 1950    Narrative:      CT ABDOMEN PELVIS WO CONTRAST-     Radiation  dose reduction techniques were utilized, including automated  exposure control and exposure modulation based on body size.     CLINICAL: Vague abdominal pain, question sepsis.     COMPARISON: None.     FINDINGS: There is right-sided hydronephrosis and located just below the  right UPJ within the proximal ureter there is a 17 x 12 mm obstructing  calculus. There is a nominal amount of perinephric fat stranding along  the inferior pole of the right kidney. The right kidney appears enlarged  compared to the left. Within the right kidney there are 2 mm size  calculi. The right renal contour is normal. Diameter of the right ureter  is normal below the calculus. The bladder is collapsed. A 6 mm  left-sided renal calculus, the left kidney is otherwise normal, no  left-sided hydronephrosis.     Gross enlargement of the uterus with multiple fibroids, the largest is  calcified measuring 6.6 cm.     The liver, gallbladder, pancreas, spleen and adrenal glands are  satisfactory in appearance, the diameter of the aorta is normal.  Retroaortic left renal vein noted, anatomic variant. No large or small  bowel abnormality seen. The appendix is normal.     CONCLUSION: Sizable, 17 x 12 mm calculus within the proximal right  ureter just below the UPJ causing hydronephrosis. Grossly in the arch  uterus with several fibroids noted.     Findings of this report called to Joanne Lantigua in the emergency room at  the time of completion, 7 PM.        This report was finalized on 3/18/2021 7:47 PM by Dr. Garth Mcfadden M.D.       XR Chest 1 View [[id number]] Collected: 03/18/21 1711     Updated: 03/18/21 1722    Narrative:      XR CHEST 1 VW-  03/18/2021     HISTORY: Shortness of breath.     Heart size is within normal limits. Lungs appear free of acute  infiltrates. Bones and soft tissues are unremarkable.       Impression:      No acute process.     This report was finalized on 3/18/2021 5:19 PM by Dr. Edwadro Mera M.D.             I  reviewed the patient's new clinical results.  I personally viewed and interpreted the patient's imaging results:        Medication Review:   cefTRIAXone, 2 g, Intravenous, Q24H  levoFLOXacin, 750 mg, Intravenous, Q48H        lactated ringers, 9 mL/hr, Last Rate: Stopped (03/19/21 0152)  Pharmacy Consult,         ASSESSMENT:   1. Sepsis   2. Hypokalemia  3. Urolithiasis  4. Acute kidney injury  5. Urinary tract infection, probable pyelonephritis.  6. Non-ST elevation MI type II    PLAN:  Patient is currently on antibiotic with Zosyn and vancomycin, need to follow-up on the culture results and de-escalate on the antibiotic as necessary, renal function has significantly improved with initial supportive measures, lactic acid has normalized, patient was taken to the OR yesterday and underwent successful placement of right ureteral stent.  Patient is hemodynamically stable, off the pressors, and is cleared to transfer out of the ICU, she will be monitored for the next 24 hours and hopefully home in a day or 2 depending on her follow-up labs and overall clinical picture  Discussed with the patient in detail her questions were answered  May need to resume her blood pressure medication as her blood pressure started to trend up patient needs to be on Norvasc.  Patient had abnormal TSH, consider repeating her thyroid function test as an outpatient when more stable  The anemia is likely dilutional  Patient has elevated troponin likely due to the sepsis and renal failure    Summary of recommendation:  1. Transfer to a stepdown unit  2. Continue with the antibiotic and de-escalate depending on the culture results  3. Repeat TSH and thyroid function test as an outpatient  4. Follow-up on renal function and CBC  5. Follow-up of the troponin level    Discussed with patient in details, notes reviewed    Disposition: Transfer to stepdown unit, hopefully home tomorrow    Zoe Johnson MD  03/19/21  11:42 EDT      Dictated utilizing  Eleonora dictation

## 2021-03-19 NOTE — ED NOTES
".  Nursing report ED to floor  Mehnaz Denney  60 y.o.  female    HPI (triage note):   Chief Complaint   Patient presents with   • Diarrhea   • Fatigue       Admitting doctor:   Froylan Castillo MD    Admitting diagnosis:   The primary encounter diagnosis was Right renal stone. Diagnoses of Hypokalemia, Sepsis, due to unspecified organism, unspecified whether acute organ dysfunction present (CMS/HCC), Acute renal failure, unspecified acute renal failure type (CMS/HCC), and Hydronephrosis with urinary obstruction due to ureteral calculus were also pertinent to this visit.    Code status:   Current Code Status     Date Active Code Status Order ID Comments User Context       Not on file    Advance Care Planning Activity          Allergies:   Patient has no known allergies.    Weight:       03/18/21  1704   Weight: 72.1 kg (159 lb)       Most recent vitals:   Vitals:    03/18/21 1704 03/18/21 1800 03/18/21 1838 03/18/21 1930   BP: 92/58 93/55 98/64 106/66   BP Location: Left arm  Left arm    Patient Position: Lying  Lying    Pulse:   114 105   Resp:   16    Temp:       TempSrc:       SpO2:   99% 97%   Weight: 72.1 kg (159 lb)      Height: 180.3 cm (71\")          Active LDAs/IV Access:   Lines, Drains & Airways    Active LDAs     Name:   Placement date:   Placement time:   Site:   Days:    Peripheral IV 03/18/21 1625 Right Antecubital   03/18/21    1625    Antecubital   less than 1    Peripheral IV 03/18/21 1807 Left Antecubital   03/18/21    1807    Antecubital   less than 1                Labs (abnormal labs have a star):   Labs Reviewed   COMPREHENSIVE METABOLIC PANEL - Abnormal; Notable for the following components:       Result Value    Glucose 129 (*)     BUN 38 (*)     Creatinine 2.10 (*)     Potassium 2.7 (*)     CO2 20.5 (*)     Albumin 2.90 (*)     AST (SGOT) 42 (*)     Alkaline Phosphatase 203 (*)     Total Bilirubin 1.7 (*)     eGFR   Amer 29 (*)     Anion Gap 16.5 (*)     All other components within " normal limits    Narrative:     GFR Normal >60  Chronic Kidney Disease <60  Kidney Failure <15     PROTIME-INR - Abnormal; Notable for the following components:    INR 1.11 (*)     All other components within normal limits   URINALYSIS W/ MICROSCOPIC IF INDICATED (NO CULTURE) - Abnormal; Notable for the following components:    Color, UA Dark Yellow (*)     Appearance, UA Turbid (*)     Blood, UA Large (3+) (*)     Protein,  mg/dL (2+) (*)     Leuk Esterase, UA Large (3+) (*)     All other components within normal limits   BNP (IN-HOUSE) - Abnormal; Notable for the following components:    proBNP 3,659.0 (*)     All other components within normal limits    Narrative:     Among patients with dyspnea, NT-proBNP is highly sensitive for the detection of acute congestive heart failure. In addition NT-proBNP of <300 pg/ml effectively rules out acute congestive heart failure with 99% negative predictive value.    Results may be falsely decreased if patient taking Biotin.     TROPONIN (IN-HOUSE) - Abnormal; Notable for the following components:    Troponin T 0.360 (*)     All other components within normal limits    Narrative:     Troponin T Reference Range:  <= 0.03 ng/mL-   Negative for AMI  >0.03 ng/mL-     Abnormal for myocardial necrosis.  Clinicians would have to utilize clinical acumen, EKG, Troponin and serial changes to determine if it is an Acute Myocardial Infarction or myocardial injury due to an underlying chronic condition.       Results may be falsely decreased if patient taking Biotin.     LACTIC ACID, PLASMA - Abnormal; Notable for the following components:    Lactate 4.3 (*)     All other components within normal limits   PROCALCITONIN - Abnormal; Notable for the following components:    Procalcitonin 190.76 (*)     All other components within normal limits    Narrative:     As a Marker for Sepsis (Non-Neonates):   1. <0.5 ng/mL represents a low risk of severe sepsis and/or septic shock.  1. >2 ng/mL  "represents a high risk of severe sepsis and/or septic shock.    As a Marker for Lower Respiratory Tract Infections that require antibiotic therapy:  PCT on Admission     Antibiotic Therapy             6-12 Hrs later  > 0.5                Strongly Recommended            >0.25 - <0.5         Recommended  0.1 - 0.25           Discouraged                   Remeasure/reassess PCT  <0.1                 Strongly Discouraged          Remeasure/reassess PCT      As 28 day mortality risk marker: \"Change in Procalcitonin Result\" (> 80 % or <=80 %) if Day 0 (or Day 1) and Day 4 values are available. Refer to [email or web address]   Change in PCT <=80 %   A decrease of PCT levels below or equal to 80 % defines a positive change in PCT test result representing a higher risk for 28-day all-cause mortality of patients diagnosed with severe sepsis or septic shock.  Change in PCT > 80 %   A decrease of PCT levels of more than 80 % defines a negative change in PCT result representing a lower risk for 28-day all-cause mortality of patients diagnosed with severe sepsis or septic shock.                Results may be falsely decreased if patient taking Biotin.    TSH - Abnormal; Notable for the following components:    TSH 6.380 (*)     All other components within normal limits   CBC WITH AUTO DIFFERENTIAL - Abnormal; Notable for the following components:    WBC 13.41 (*)     RBC 5.56 (*)     MCV 70.3 (*)     MCH 23.0 (*)     Platelets 80 (*)     All other components within normal limits   MANUAL DIFFERENTIAL - Abnormal; Notable for the following components:    Neutrophil % 91.9 (*)     Lymphocyte % 4.0 (*)     Monocyte % 3.0 (*)     Neutrophils Absolute 12.32 (*)     Lymphocytes Absolute 0.54 (*)     All other components within normal limits   URINALYSIS, MICROSCOPIC ONLY - Abnormal; Notable for the following components:    RBC, UA Unable to determine due to loaded field (*)     WBC, UA Too Numerous to Count (*)     " Bacteria, UA Unable to determine due to loaded field (*)     Squamous Epithelial Cells, UA Unable to determine due to loaded field (*)     All other components within normal limits   COVID-19,[facility] IN-HOUSE CEPHEID, NP SWAB IN TRANSPORT MEDIA 8-12 HR TAT - Normal    Narrative:     Fact sheet for providers: https://www.fda.gov/media/277739/download     Fact sheet for patients: https://www.fda.gov/media/064943/download   LIPASE - Normal   T4, FREE - Normal    Narrative:     Results may be falsely increased if patient taking Biotin.     MAGNESIUM - Normal   COVID PRE-OP / PRE-PROCEDURE SCREENING ORDER (NO ISOLATION)    Narrative:     The following orders were created for panel order COVID PRE-OP / PRE-PROCEDURE SCREENING ORDER (NO ISOLATION) - Swab, Nasopharynx.  Procedure                               Abnormality         Status                     ---------                               -----------         ------                     COVID-19,[facility] IN-HOUSE...[[id number]]  Normal              Final result                 Please view results for these tests on the individual orders.   COVID PRE-OP / PRE-PROCEDURE SCREENING ORDER (NO ISOLATION)    Narrative:     The following orders were created for panel order COVID PRE-OP / PRE-PROCEDURE SCREENING ORDER (NO ISOLATION) - Swab, Nasopharynx.  Procedure                               Abnormality         Status                     ---------                               -----------         ------                     COVID-19,[facility]TIM[facility] PANTHER,...[[id number]]                                                   Please view results for these tests on the individual orders.   BLOOD CULTURE   BLOOD CULTURE   COVID-19,[facility]UC[facility] IN-HOUSE,NP/OP SWAB IN UTM/VTM/SALINE TRANSPORT MEDIA,24 HR TAT   LACTIC ACID, REFLEX   POCT OCCULT BLOOD STOOL (ED ONLY)   CBC AND DIFFERENTIAL    Narrative:     The following orders were created for panel order CBC & Differential.  Procedure                                Abnormality         Status                     ---------                               -----------         ------                     CBC Auto Differential[[id number]]        Abnormal            Final result                 Please view results for these tests on the individual orders.       EKG:   ECG 12 Lead   Preliminary Result   HEART RATE= 118  bpm   RR Interval= 508  ms   AR Interval= 176  ms   P Horizontal Axis= 37  deg   P Front Axis= 64  deg   QRSD Interval= 97  ms   QT Interval= 295  ms   QRS Axis= -54  deg   T Wave Axis= 24  deg   - ABNORMAL ECG -   Sinus tachycardia   Left anterior fascicular block   ST elevation suggests acute pericarditis   Electronically Signed By:    Date and Time of Study: 2021-03-18 17:18:39          Meds given in ED:   Medications   sodium chloride 0.9 % flush 10 mL (has no administration in time range)   vancomycin 1500 mg/500 mL 0.9% NS IVPB ([facility]) (1,500 mg Intravenous New Bag 3/18/21 2004)   sodium chloride 0.9 % bolus 2,163 mL (0 mL Intravenous Stopped 3/18/21 1834)   potassium chloride (K-DUR,KLOR-CON) ER tablet 40 mEq (40 mEq Oral Given 3/18/21 1847)   piperacillin-tazobactam (ZOSYN) 3.375 g in iso-osmotic dextrose 50 ml (premix) (0 g Intravenous Stopped 3/18/21 2006)       Imaging results:  XR Chest 1 View    Result Date: 3/18/2021  No acute process.  This report was finalized on 3/18/2021 5:19 PM by Dr. Edwardo Mera M.D.        Ambulatory status:   -     Social issues:   Social History     Socioeconomic History   • Marital status: Single     Spouse name: Not on file   • Number of children: Not on file   • Years of education: Not on file   • Highest education level: Not on file   Tobacco Use   • Smoking status: Never Smoker   • Smokeless tobacco: Never Used   Substance and Sexual Activity   • Alcohol use: Yes     Alcohol/week: 1.0 standard drinks     Types: 1 Glasses of wine per week     Comment: Occ//Caffeine use: 2-3 cups daily   • Drug use: Defer    • Sexual activity: Defer     Partners: Male     Birth control/protection: Tubal ligation    Nursing report ED to [facility]       Shanika Avila RN  03/18/21 0397

## 2021-03-20 LAB
ALBUMIN SERPL-MCNC: 2.5 G/DL (ref 3.5–5.2)
ALBUMIN/GLOB SERPL: 0.7 G/DL
ALP SERPL-CCNC: 146 U/L (ref 39–117)
ALT SERPL W P-5'-P-CCNC: 22 U/L (ref 1–33)
ANION GAP SERPL CALCULATED.3IONS-SCNC: 8.6 MMOL/L (ref 5–15)
AST SERPL-CCNC: 28 U/L (ref 1–32)
BACTERIA SPEC AEROBE CULT: ABNORMAL
BILIRUB SERPL-MCNC: 0.6 MG/DL (ref 0–1.2)
BUN SERPL-MCNC: 22 MG/DL (ref 6–20)
BUN/CREAT SERPL: 23.7 (ref 7–25)
CALCIUM SPEC-SCNC: 10.1 MG/DL (ref 8.6–10.5)
CHLORIDE SERPL-SCNC: 110 MMOL/L (ref 98–107)
CO2 SERPL-SCNC: 21.4 MMOL/L (ref 22–29)
CREAT SERPL-MCNC: 0.93 MG/DL (ref 0.57–1)
DEPRECATED RDW RBC AUTO: 36.8 FL (ref 37–54)
ERYTHROCYTE [DISTWIDTH] IN BLOOD BY AUTOMATED COUNT: 14.9 % (ref 12.3–15.4)
GFR SERPL CREATININE-BSD FRML MDRD: 75 ML/MIN/1.73
GLOBULIN UR ELPH-MCNC: 3.7 GM/DL
GLUCOSE SERPL-MCNC: 85 MG/DL (ref 65–99)
GRAM STN SPEC: ABNORMAL
HCT VFR BLD AUTO: 29.2 % (ref 34–46.6)
HGB BLD-MCNC: 10 G/DL (ref 12–15.9)
ISOLATED FROM: ABNORMAL
LYMPHOCYTES # BLD MANUAL: 0.67 10*3/MM3 (ref 0.7–3.1)
LYMPHOCYTES NFR BLD MANUAL: 1 % (ref 5–12)
LYMPHOCYTES NFR BLD MANUAL: 3.1 % (ref 19.6–45.3)
MCH RBC QN AUTO: 24 PG (ref 26.6–33)
MCHC RBC AUTO-ENTMCNC: 34.2 G/DL (ref 31.5–35.7)
MCV RBC AUTO: 70.2 FL (ref 79–97)
MONOCYTES # BLD AUTO: 0.22 10*3/MM3 (ref 0.1–0.9)
NEUTROPHILS # BLD AUTO: 20.68 10*3/MM3 (ref 1.7–7)
NEUTROPHILS NFR BLD MANUAL: 95.9 % (ref 42.7–76)
PLAT MORPH BLD: NORMAL
PLATELET # BLD AUTO: 105 10*3/MM3 (ref 140–450)
PMV BLD AUTO: 11.8 FL (ref 6–12)
POTASSIUM SERPL-SCNC: 3.6 MMOL/L (ref 3.5–5.2)
PROT SERPL-MCNC: 6.2 G/DL (ref 6–8.5)
RBC # BLD AUTO: 4.16 10*6/MM3 (ref 3.77–5.28)
RBC MORPH BLD: NORMAL
SODIUM SERPL-SCNC: 140 MMOL/L (ref 136–145)
WBC # BLD AUTO: 21.56 10*3/MM3 (ref 3.4–10.8)
WBC MORPH BLD: NORMAL

## 2021-03-20 PROCEDURE — 25010000002 LEVOFLOXACIN PER 250 MG: Performed by: INTERNAL MEDICINE

## 2021-03-20 PROCEDURE — 85025 COMPLETE CBC W/AUTO DIFF WBC: CPT | Performed by: INTERNAL MEDICINE

## 2021-03-20 PROCEDURE — 80053 COMPREHEN METABOLIC PANEL: CPT | Performed by: INTERNAL MEDICINE

## 2021-03-20 PROCEDURE — 25010000003 CEFTRIAXONE PER 250 MG: Performed by: INTERNAL MEDICINE

## 2021-03-20 PROCEDURE — 85007 BL SMEAR W/DIFF WBC COUNT: CPT | Performed by: INTERNAL MEDICINE

## 2021-03-20 RX ADMIN — LEVOFLOXACIN 750 MG: 750 INJECTION, SOLUTION INTRAVENOUS at 03:40

## 2021-03-20 RX ADMIN — CEFTRIAXONE SODIUM 2 G: 2 INJECTION, SOLUTION INTRAVENOUS at 03:39

## 2021-03-20 NOTE — PROGRESS NOTES
S/p Right ureteral stent 3/18  VSSA  Cr 0.93  WBC 21  Hgb 10  UCS Prelim Ecoli on Rocephin and LVQ    NAD  Pain is managed  Discussed VT once ambulatory  Awaiting move out of ICU    Plan:  Will follow  VT once ambulatory  PT

## 2021-03-20 NOTE — PLAN OF CARE
Goal Outcome Evaluation:     Progress: improving  Outcome Summary: ambulated in hallway and [facility] well

## 2021-03-20 NOTE — PLAN OF CARE
Goal Outcome Evaluation:     Progress: improving  Outcome Summary: transfer from icu, vss, no c/o pain jono diet fair , f/c patent with clear yellow urine

## 2021-03-21 LAB
ANION GAP SERPL CALCULATED.3IONS-SCNC: 7.8 MMOL/L (ref 5–15)
BACTERIA SPEC AEROBE CULT: ABNORMAL
BACTERIA SPEC AEROBE CULT: ABNORMAL
BUN SERPL-MCNC: 13 MG/DL (ref 6–20)
BUN/CREAT SERPL: 16.5 (ref 7–25)
CALCIUM SPEC-SCNC: 9.2 MG/DL (ref 8.6–10.5)
CHLORIDE SERPL-SCNC: 108 MMOL/L (ref 98–107)
CO2 SERPL-SCNC: 23.2 MMOL/L (ref 22–29)
CREAT SERPL-MCNC: 0.79 MG/DL (ref 0.57–1)
DEPRECATED RDW RBC AUTO: 38.4 FL (ref 37–54)
ERYTHROCYTE [DISTWIDTH] IN BLOOD BY AUTOMATED COUNT: 15.3 % (ref 12.3–15.4)
GFR SERPL CREATININE-BSD FRML MDRD: 90 ML/MIN/1.73
GLUCOSE SERPL-MCNC: 76 MG/DL (ref 65–99)
GRAM STN SPEC: ABNORMAL
HCT VFR BLD AUTO: 29.6 % (ref 34–46.6)
HGB BLD-MCNC: 9.7 G/DL (ref 12–15.9)
ISOLATED FROM: ABNORMAL
MAGNESIUM SERPL-MCNC: 1.2 MG/DL (ref 1.6–2.6)
MCH RBC QN AUTO: 23.2 PG (ref 26.6–33)
MCHC RBC AUTO-ENTMCNC: 32.8 G/DL (ref 31.5–35.7)
MCV RBC AUTO: 70.8 FL (ref 79–97)
PHOSPHATE SERPL-MCNC: 3.8 MG/DL (ref 2.5–4.5)
PLATELET # BLD AUTO: 154 10*3/MM3 (ref 140–450)
PMV BLD AUTO: 11.2 FL (ref 6–12)
POTASSIUM SERPL-SCNC: 3.3 MMOL/L (ref 3.5–5.2)
RBC # BLD AUTO: 4.18 10*6/MM3 (ref 3.77–5.28)
SODIUM SERPL-SCNC: 139 MMOL/L (ref 136–145)
WBC # BLD AUTO: 9.42 10*3/MM3 (ref 3.4–10.8)

## 2021-03-21 PROCEDURE — 25010000002 MAGNESIUM SULFATE 2 GM/50ML SOLUTION: Performed by: INTERNAL MEDICINE

## 2021-03-21 PROCEDURE — 25010000002 LEVOFLOXACIN PER 250 MG: Performed by: INTERNAL MEDICINE

## 2021-03-21 PROCEDURE — 80048 BASIC METABOLIC PNL TOTAL CA: CPT | Performed by: INTERNAL MEDICINE

## 2021-03-21 PROCEDURE — 97110 THERAPEUTIC EXERCISES: CPT

## 2021-03-21 PROCEDURE — 83735 ASSAY OF MAGNESIUM: CPT | Performed by: INTERNAL MEDICINE

## 2021-03-21 PROCEDURE — 25010000003 CEFTRIAXONE PER 250 MG: Performed by: INTERNAL MEDICINE

## 2021-03-21 PROCEDURE — 84100 ASSAY OF PHOSPHORUS: CPT | Performed by: INTERNAL MEDICINE

## 2021-03-21 PROCEDURE — 97161 PT EVAL LOW COMPLEX 20 MIN: CPT

## 2021-03-21 PROCEDURE — 85027 COMPLETE CBC AUTOMATED: CPT | Performed by: INTERNAL MEDICINE

## 2021-03-21 RX ORDER — MAGNESIUM SULFATE HEPTAHYDRATE 40 MG/ML
2 INJECTION, SOLUTION INTRAVENOUS AS NEEDED
Status: DISCONTINUED | OUTPATIENT
Start: 2021-03-21 | End: 2021-03-22 | Stop reason: HOSPADM

## 2021-03-21 RX ORDER — MAGNESIUM SULFATE HEPTAHYDRATE 40 MG/ML
4 INJECTION, SOLUTION INTRAVENOUS AS NEEDED
Status: DISCONTINUED | OUTPATIENT
Start: 2021-03-21 | End: 2021-03-22 | Stop reason: HOSPADM

## 2021-03-21 RX ORDER — POTASSIUM CHLORIDE 750 MG/1
40 TABLET, FILM COATED, EXTENDED RELEASE ORAL AS NEEDED
Status: DISCONTINUED | OUTPATIENT
Start: 2021-03-21 | End: 2021-03-22 | Stop reason: HOSPADM

## 2021-03-21 RX ORDER — POTASSIUM CHLORIDE 1.5 G/1.77G
40 POWDER, FOR SOLUTION ORAL AS NEEDED
Status: DISCONTINUED | OUTPATIENT
Start: 2021-03-21 | End: 2021-03-22 | Stop reason: HOSPADM

## 2021-03-21 RX ADMIN — MAGNESIUM SULFATE HEPTAHYDRATE 2 G: 2 INJECTION, SOLUTION INTRAVENOUS at 15:50

## 2021-03-21 RX ADMIN — LEVOFLOXACIN 750 MG: 750 INJECTION, SOLUTION INTRAVENOUS at 04:10

## 2021-03-21 RX ADMIN — POTASSIUM CHLORIDE 40 MEQ: 1.5 POWDER, FOR SOLUTION ORAL at 18:13

## 2021-03-21 RX ADMIN — POTASSIUM CHLORIDE 40 MEQ: 1.5 POWDER, FOR SOLUTION ORAL at 12:11

## 2021-03-21 RX ADMIN — MAGNESIUM SULFATE HEPTAHYDRATE 2 G: 2 INJECTION, SOLUTION INTRAVENOUS at 10:43

## 2021-03-21 RX ADMIN — MAGNESIUM SULFATE HEPTAHYDRATE 2 G: 2 INJECTION, SOLUTION INTRAVENOUS at 08:24

## 2021-03-21 RX ADMIN — CEFTRIAXONE SODIUM 2 G: 2 INJECTION, SOLUTION INTRAVENOUS at 03:38

## 2021-03-21 NOTE — PROGRESS NOTES
S/p Right ureteral stent 3/18  Transferred from ICU to floor  Sitting up in bed  NAD  A&O x 3  Ambulated yesterday  Still with posey, VT this am    [facility] Prelim Ecoli on Rocephin and LVQ     Plan:  D/C posey  Plan outpatient treatment of stones, has stent  Continue abx

## 2021-03-21 NOTE — PLAN OF CARE
Goal Outcome Evaluation:  Plan of Care Reviewed With: patient  Progress: improving  Outcome Summary: vss, no c/o pain, posey to BSD with good urine output, iv anitbiotic given, tolerating regular diet, encourage to increase fluid intake, ambulated in hallway, continue to monitor the pt.

## 2021-03-21 NOTE — PLAN OF CARE
Goal Outcome Evaluation:  Plan of Care Reviewed With: patient     Outcome Summary: Pt is a 59 yo female admitted with generalized weakness, fatigue and diarrhea. Pt reports IND at baseline and typically lives alone but plans to d/c home with 24/7 assist from daughters for a while. Pt required SBA for bed mobility, cga for transfers and ambulation. No overt balance deficits noted but pt appears guarded during gait and demonstrates decreased activity tolerance. Encouraged pt to ambulate in hallway 3x/day with nursing staff and to perform LE exercises. PT will see pt 3x/week to address endurance and strength deficits. Anticipate dc home with assist.    Patient was intermittently wearing a face mask during this therapy encounter. Therapist used appropriate personal protective equipment including eye protection, mask, and gloves.  Mask used was standard procedure mask. Appropriate PPE was worn during the entire therapy session. Hand hygiene was completed before and after therapy session. Patient is not in enhanced droplet precautions.

## 2021-03-21 NOTE — THERAPY EVALUATION
Patient Name: Mehnaz Denney  : 1960    MRN: [id number]                              Today's Date: 3/21/2021       Admit Date: 3/18/2021    Visit Dx:     ICD-10-CM ICD-9-CM   1. Right renal stone  N20.0 592.0   2. Hypokalemia  E87.6 276.8   3. Sepsis, due to unspecified organism, unspecified whether acute organ dysfunction present (CMS/[facility])  A41.9 038.9     995.91   4. Acute renal failure, unspecified acute renal failure type (CMS/[facility])  N17.9 584.9   5. Hydronephrosis with urinary obstruction due to ureteral calculus  N13.2 592.1     591   6. Right ureteral stone  N20.1 592.1     Patient Active Problem List   Diagnosis   • Iron deficiency anemia due to chronic blood loss   • Essential hypertension   • Uterine fibroid   • Hypercholesterolemia   • Postmenopausal bleeding   • Intramural leiomyoma of uterus   • PMB (postmenopausal bleeding)   • Right renal stone     Past Medical History:   Diagnosis Date   • History of anemia    • Hyperlipidemia    • Hypertension    • PONV (postoperative nausea and vomiting)    • Post-menopause bleeding    • Uterine fibroid      Past Surgical History:   Procedure Laterality Date   •  SECTION      TWINS   • CYSTOSCOPY W/ URETERAL STENT PLACEMENT Right 3/18/2021    Procedure: CYSTOSCOPY, RIGHT  URETERAL STENT INSERTION, AND RUSH CATHETER PLACEMENT;  Surgeon: Remy Bowens MD;  Location: [facility];  Service: Urology;  Laterality: Right;   • D & C WITH SUCTION     • D & C WITH SUCTION     • LAPAROSCOPIC TUBAL LIGATION       General Information     Row Name 21 1005          Physical Therapy Time and Intention    Document Type  evaluation  -CF     Mode of Treatment  individual therapy;physical therapy  -CF     Row Name 21 1005          General Information    Patient Profile Reviewed  yes  -CF     Prior Level of Function  independent: no AD, denies falls  -CF     Existing Precautions/Restrictions  fall  -CF     Barriers to Rehab  none  identified  -CF     Row Name 03/21/21 1005          Living Environment    Lives With  alone plans to have 24/7 assist from daughters at d/c  -CF     Row Name 03/21/21 1005          Home Main Entrance    Number of Stairs, Main Entrance  none  -CF     Row Name 03/21/21 1005          Stairs Within Home, Primary    Number of Stairs, Within Home, Primary  none  -CF     Row Name 03/21/21 1005          Cognition    Orientation Status (Cognition)  oriented x 4  -CF     Row Name 03/21/21 1005          Safety Issues, Functional Mobility    Impairments Affecting Function (Mobility)  endurance/activity tolerance;strength  -CF       User Key  (r) = Recorded By, (t) = Taken By, (c) = Cosigned By    Initials Name Provider Type    CF Ludy Roque, ELIOT Physical Therapist        Mobility     Row Name 03/21/21 1006          Bed Mobility    Bed Mobility  supine-sit;sit-supine  -CF     Supine-Sit [place] (Bed Mobility)  standby assist  -CF     Sit-Supine [place] (Bed Mobility)  standby assist  -CF     [facility] Name 03/21/21 1006          Sit-Stand Transfer    Sit-Stand [place] (Transfers)  contact guard  -[facility] Name 03/21/21 1006          Gait/Stairs (Locomotion)    [place] Level (Gait)  contact guard;standby assist  -CF     Distance in Feet (Gait)  200'  -CF     Deviations/Abnormal Patterns (Gait)  monica decreased;gait speed decreased  -CF     Bilateral Gait Deviations  forward flexed posture  -CF     Comment (Gait/Stairs)  slow pace, decreased arm swing, appears guarded and cautious. no overt LOB noted.  -CF       User Key  (r) = Recorded By, (t) = Taken By, (c) = Cosigned By    Initials Name Provider Type    CF Ludy Roque, ELIOT Physical Therapist        Obj/Interventions     Row Name 03/21/21 1007          Range of Motion Comprehensive    General Range of Motion  bilateral lower extremity ROM WFL  -CF     [facility] Name 03/21/21 1007          Strength Comprehensive (MMT)    Comment, General Manual Muscle  Testing (MMT) Assessment  BLE WFL grossly 3/5 at least  -     Row Name 03/21/21 1007          Motor Skills    Therapeutic Exercise  other (see comments) B AP,, QS, GS, SLR, heel slides x5. encouraged pt to performed 2-3 times/day  -     Row Name 03/21/21 1007          Balance    Balance Assessment  standing dynamic balance  -     Dynamic Standing Balance  WFL cga for safety  -     Row Name 03/21/21 1007          Sensory Assessment (Somatosensory)    Sensory Assessment (Somatosensory)  sensation intact  -CF       User Key  (r) = Recorded By, (t) = Taken By, (c) = Cosigned By    Initials Name Provider Type     Ludy Roque, PT Physical Therapist        Goals/Plan     Row Name 03/21/21 1010          Bed Mobility Goal 1 (PT)    Activity/Assistive Device (Bed Mobility Goal 1, PT)  bed mobility activities, all  -CF     [place] Level/Cues Needed (Bed Mobility Goal 1, PT)  modified independence  -CF     Time Frame (Bed Mobility Goal 1, PT)  1 week  -[facility] Name 03/21/21 1010          Transfer Goal 1 (PT)    Activity/Assistive Device (Transfer Goal 1, PT)  sit-to-stand/stand-to-sit;bed-to-chair/chair-to-bed  -CF     [place] Level/Cues Needed (Transfer Goal 1, PT)  modified independence  -CF     Time Frame (Transfer Goal 1, PT)  1 week  -[facility] Name 03/21/21 1010          Gait Training Goal 1 (PT)    Activity/Assistive Device (Gait Training Goal 1, PT)  gait (walking locomotion)  -CF     [place] Level (Gait Training Goal 1, PT)  standby assist  -CF     Distance (Gait Training Goal 1, PT)  300'  -CF     Time Frame (Gait Training Goal 1, PT)  1 week  -CF       User Key  (r) = Recorded By, (t) = Taken By, (c) = Cosigned By    Initials Name Provider Type     Ludy Roque PT Physical Therapist        Clinical Impression     Row Name 03/21/21 1007          Pain    Additional Documentation  Pain Scale: Numbers Pre/Post-Treatment (Group)  -     Row Name 03/21/21 1007          Pain  Scale: Numbers Pre/Post-Treatment    Pretreatment Pain Rating  0/10 - no pain  -CF     Posttreatment Pain Rating  0/10 - no pain  -CF     Row Name 03/21/21 1007          Plan of Care Review    Plan of Care Reviewed With  patient  -CF     Outcome Summary  Pt is a 59 yo female admitted with generalized weakness, fatigue and diarrhea. Pt reports IND at baseline and typically lives alone but plans to d/c home with 24/7 assist from daughters for a while. Pt required SBA for bed mobility, cga for transfers and ambulation. No overt balance deficits noted but pt appears guarded during gait and demonstrates decreased activity tolerance. Encouraged pt to ambulate in hallway 3x/day with nursing staff and to perform LE exercises. PT will see pt 3x/week to address endurance and strength deficits. Anticipate dc home with assist.  -CF     Row Name 03/21/21 1007          Therapy Assessment/Plan (PT)    Rehab Potential (PT)  good, to achieve stated therapy goals  -CF     Criteria for Skilled Interventions Met (PT)  yes  -CF     Predicted Duration of Therapy Intervention (PT)  1 week  -CF     Row Name 03/21/21 1007          Vital Signs    O2 Delivery Pre Treatment  room air  -CF     Row Name 03/21/21 1007          Positioning and Restraints    Pre-Treatment Position  in bed  -CF     Post Treatment Position  bed declined chair  -CF     In Bed  fowlers;call light within reach;encouraged to call for assist;exit alarm on;SCD pump applied  -CF       User Key  (r) = Recorded By, (t) = Taken By, (c) = Cosigned By    Initials Name Provider Type    CF Ludy Roque, PT Physical Therapist        Outcome Measures     Row Name 03/21/21 1011          How much help from another person do you currently need...    Turning from your back to your side while in flat bed without using bedrails?  4  -CF     Moving from lying on back to sitting on the side of a flat bed without bedrails?  4  -CF     Moving to and from a bed to a chair (including a  wheelchair)?  3  -CF     Standing up from a chair using your arms (e.g., wheelchair, bedside chair)?  3  -CF     Climbing 3-5 steps with a railing?  3  -CF     To walk in hospital room?  3  -CF     AM-PAC 6 Clicks Score (PT)  20  -CF     Row Name 03/21/21 1011          Functional Assessment    Outcome Measure Options  AM-PAC 6 Clicks Basic Mobility (PT)  -CF       User Key  (r) = Recorded By, (t) = Taken By, (c) = Cosigned By    Initials Name Provider Type    CF Ludy Roque, PT Physical Therapist        Physical Therapy Education                 Title: PT OT SLP Therapies (Done)     Topic: Physical Therapy (Done)     Point: Mobility training (Done)     Learning Progress Summary           Patient Acceptance, E, VU,NR by CF at 3/21/2021 1011                   Point: Home exercise program (Done)     Learning Progress Summary           Patient Acceptance, E, VU,NR by CF at 3/21/2021 1011                   Point: Body mechanics (Done)     Learning Progress Summary           Patient Acceptance, E, VU,NR by CF at 3/21/2021 1011                   Point: Precautions (Done)     Learning Progress Summary           Patient Acceptance, E, VU,NR by CF at 3/21/2021 1011                               User Key     Initials Effective Dates Name Provider Type Discipline    CF 09/02/20 -  Ludy Roque, ELIOT Physical Therapist PT              PT Recommendation and Plan  Planned Therapy Interventions (PT): balance training, bed mobility training, gait training, home exercise program, stair training, patient/family education, strengthening  Plan of Care Reviewed With: patient  Outcome Summary: Pt is a 59 yo female admitted with generalized weakness, fatigue and diarrhea. Pt reports IND at baseline and typically lives alone but plans to d/c home with 24/7 assist from daughters for a while. Pt required SBA for bed mobility, cga for transfers and ambulation. No overt balance deficits noted but pt appears guarded during gait and  demonstrates decreased activity tolerance. Encouraged pt to ambulate in hallway 3x/day with nursing staff and to perform LE exercises. PT will see pt 3x/week to address endurance and strength deficits. Anticipate dc home with assist.     Time Calculation:   PT Charges     Row Name 03/21/21 1004             Time Calculation    Start Time  0941  -CF      Stop Time  0956  -      Time Calculation (min)  15 min  -CF      PT Received On  03/21/21  -CF      PT - Next Appointment  03/22/21  -      PT Goal Re-Cert Due Date  03/29/21  -        User Key  (r) = Recorded By, (t) = Taken By, (c) = Cosigned By    Initials Name Provider Type    CF Ludy Roque, PT Physical Therapist        Therapy Charges for Today     Code Description Service Date Service Provider Modifiers Qty    38587196966 HC PT EVAL LOW COMPLEXITY 2 3/21/2021 Ludy Roque, PT GP 1    67568680685 HC PT THER PROC EA 15 MIN 3/21/2021 Ludy Roque, PT GP 1          PT G-Codes  Outcome Measure Options: AM-PAC 6 Clicks Basic Mobility (PT)  AM-PAC 6 Clicks Score (PT): 20    Ludy Rouqe PT  3/21/2021

## 2021-03-21 NOTE — PROGRESS NOTES
[facility]  [phone]  Rashi Gutierrez MD    Subjective:  LOS: 2    Feels better, wants to go home tomorrow. Now ambulating.    Objective   Vital Signs past 24hrs    Temp range: Temp (24hrs), Av °F (36.7 °C), Min:97.3 °F (36.3 °C), Max:98.4 °F (36.9 °C)    BP range: BP: (117-134)/(75-87) 117/76  Pulse range: Heart Rate:  [83-98] 91  Resp rate range: Resp:  [18] 18    Device (Oxygen Therapy): room air   Oxygen range:SpO2:  [98 %-99 %] 98 %      73.5 kg (162 lb 0.6 oz); Body mass index is 23.93 kg/m².    Intake/Output Summary (Last 24 hours) at 3/21/2021 1307  Last data filed at 3/21/2021 1300  Gross per 24 hour   Intake 1800 ml   Output 2650 ml   Net -850 ml       Physical Exam  HENT:      Head: Normocephalic.   Eyes:      Pupils: Pupils are equal, round, and reactive to light.   Cardiovascular:      Rate and Rhythm: Normal rate and regular rhythm.      Heart sounds: Normal heart sounds. No murmur heard.     Pulmonary:      Effort: Pulmonary effort is normal.      Breath sounds: Normal breath sounds.   Abdominal:      General: Bowel sounds are normal.      Palpations: Abdomen is soft. There is no mass.      Tenderness: There is no abdominal tenderness.   Musculoskeletal:      Left lower leg: No edema.   Neurological:      Mental Status: She is alert.       Results Review:    I have reviewed the laboratory and imaging data since the last note by [facility] physician.  My annotations are noted in assessment and plan.    Medication Review:  I have reviewed the current MAR.  My annotations are noted in assessment and plan.    lactated ringers, 9 mL/hr, Last Rate: Stopped (21 0152)      Plan   PCCM Problems  1. Sepsis   2. Hypokalemia  3. Urolithiasis - stent 3/16  4. Acute kidney injury  5. Urinary tract infection, probable pyelonephritis.  6. Non-ST elevation MI type II        Plan of Treatment    Sepsis resolving. On abx. Urine E.coli pan sensitive. Plan Levaquin on discharge.    Carvalho with DC.    Home  tomorrow if ok with urology.    Rashi Gutierrez MD  03/21/21  13:07 EDT    While in the room and during my examination of the patient I wore gloves, gown, mask, eye protection and followed enhanced droplet/contact isolation protocol and precautions.  I washed my hands before and after this patient encounter.    Part of this note may be an electronic transcription/translation of spoken language to printed text using the Dragon Dictation System.

## 2021-03-22 ENCOUNTER — READMISSION MANAGEMENT (OUTPATIENT)
Dept: CALL CENTER | Facility: HOSPITAL | Age: 61
End: 2021-03-22

## 2021-03-22 VITALS
TEMPERATURE: 97.5 F | HEIGHT: 69 IN | WEIGHT: 162.04 LBS | DIASTOLIC BLOOD PRESSURE: 85 MMHG | HEART RATE: 86 BPM | SYSTOLIC BLOOD PRESSURE: 128 MMHG | RESPIRATION RATE: 18 BRPM | OXYGEN SATURATION: 99 % | BODY MASS INDEX: 24 KG/M2

## 2021-03-22 LAB
ANION GAP SERPL CALCULATED.3IONS-SCNC: 4.9 MMOL/L (ref 5–15)
BUN SERPL-MCNC: 6 MG/DL (ref 6–20)
BUN/CREAT SERPL: 9 (ref 7–25)
CALCIUM SPEC-SCNC: 9.4 MG/DL (ref 8.6–10.5)
CHLORIDE SERPL-SCNC: 108 MMOL/L (ref 98–107)
CO2 SERPL-SCNC: 27.1 MMOL/L (ref 22–29)
CREAT SERPL-MCNC: 0.67 MG/DL (ref 0.57–1)
DEPRECATED RDW RBC AUTO: 38.2 FL (ref 37–54)
ERYTHROCYTE [DISTWIDTH] IN BLOOD BY AUTOMATED COUNT: 14.8 % (ref 12.3–15.4)
GFR SERPL CREATININE-BSD FRML MDRD: 109 ML/MIN/1.73
GLUCOSE SERPL-MCNC: 78 MG/DL (ref 65–99)
HCT VFR BLD AUTO: 31.1 % (ref 34–46.6)
HGB BLD-MCNC: 10 G/DL (ref 12–15.9)
MAGNESIUM SERPL-MCNC: 2 MG/DL (ref 1.6–2.6)
MCH RBC QN AUTO: 23.1 PG (ref 26.6–33)
MCHC RBC AUTO-ENTMCNC: 32.2 G/DL (ref 31.5–35.7)
MCV RBC AUTO: 72 FL (ref 79–97)
PHOSPHATE SERPL-MCNC: 2.9 MG/DL (ref 2.5–4.5)
PLATELET # BLD AUTO: 205 10*3/MM3 (ref 140–450)
PMV BLD AUTO: 11.1 FL (ref 6–12)
POTASSIUM SERPL-SCNC: 3.9 MMOL/L (ref 3.5–5.2)
RBC # BLD AUTO: 4.32 10*6/MM3 (ref 3.77–5.28)
SODIUM SERPL-SCNC: 140 MMOL/L (ref 136–145)
WBC # BLD AUTO: 7.96 10*3/MM3 (ref 3.4–10.8)

## 2021-03-22 PROCEDURE — 84100 ASSAY OF PHOSPHORUS: CPT | Performed by: INTERNAL MEDICINE

## 2021-03-22 PROCEDURE — 25010000003 CEFTRIAXONE PER 250 MG: Performed by: INTERNAL MEDICINE

## 2021-03-22 PROCEDURE — 85027 COMPLETE CBC AUTOMATED: CPT | Performed by: INTERNAL MEDICINE

## 2021-03-22 PROCEDURE — 83735 ASSAY OF MAGNESIUM: CPT | Performed by: INTERNAL MEDICINE

## 2021-03-22 PROCEDURE — 80048 BASIC METABOLIC PNL TOTAL CA: CPT | Performed by: INTERNAL MEDICINE

## 2021-03-22 RX ORDER — LEVOFLOXACIN 500 MG/1
500 TABLET, FILM COATED ORAL DAILY
Qty: 7 TABLET | Refills: 0 | Status: SHIPPED | OUTPATIENT
Start: 2021-03-22 | End: 2021-06-17 | Stop reason: ALTCHOICE

## 2021-03-22 RX ADMIN — CEFTRIAXONE SODIUM 2 G: 2 INJECTION, SOLUTION INTRAVENOUS at 04:39

## 2021-03-22 NOTE — PROGRESS NOTES
"  [facility] DAILY PROGRESS NOTE    Patient Identification  Name: Mehnaz Denney  Age: 60 y.o.  Sex: female  :  1960  MRN: [id number]    Date: 3/22/2021             Subjective:  Interval History:   Doing very well  Feels ''100%'' better    Objective:    Scheduled Meds:cefTRIAXone, 2 g, Intravenous, Q24H      Continuous Infusions:lactated ringers, 9 mL/hr, Last Rate: Stopped (21 0152)      PRN Meds:lactated ringers  •  magnesium sulfate **OR** magnesium sulfate **OR** magnesium sulfate  •  melatonin  •  potassium chloride  •  potassium chloride  •  [COMPLETED] Insert peripheral IV **AND** sodium chloride    Vital signs in last 24 hours:  Temp:  [97.5 °F (36.4 °C)-98.5 °F (36.9 °C)] 97.5 °F (36.4 °C)  Heart Rate:  [] 82  Resp:  [18] 18  BP: (117-127)/(75-81) 124/81    Intake/Output:    Intake/Output Summary (Last 24 hours) at 3/22/2021 0655  Last data filed at 3/22/2021 0625  Gross per 24 hour   Intake 1020 ml   Output 3200 ml   Net -2180 ml       Exam:  /81 (BP Location: Right arm, Patient Position: Lying)   Pulse 82   Temp 97.5 °F (36.4 °C) (Oral)   Resp 18   Ht 175.3 cm (69\")   Wt 73.5 kg (162 lb 0.6 oz)   LMP  (LMP Unknown)   SpO2 97%   BMI 23.93 kg/m²     General Appearance:    Alert, cooperative, no distress, appears stated age   Back:     Symmetric, no CVA tenderness   Lungs:     Clear to auscultation bilaterally, respirations unlabored   Heart:    Regular rate and rhythm, strong peripheral pulses   Abdomen:    Soft   Genitalia:   Not examined   Extremities:   Extremities normal, atraumatic, no cyanosis or edema   Skin:   Skin color, texture, turgor normal, no rashes or lesions        Data Review:  All labs (24hrs):   No results found for this or any previous visit (from the past 24 hour(s)).       Assessment:    Right renal stone    Right UPJ stone, obstructing  UTI/Sepsis    Plan:    Plan outpatient treatment of stones, has stent  Continue abx  Okay for Discharge home, " today  Follow-up with Dr. Remy Bowens ([facility]) after Discharge - Schedulers messaged.      Nito Felix MD  3/22/2021  06:55 EDT

## 2021-03-22 NOTE — DISCHARGE SUMMARY
PHYSICIAN DISCHARGE SUMMARY                                                                        [facility]    Patient Identification:  Name: Christian Denney  Age: 60 y.o.  Sex: female  :  1960  MRN: [id number]  Primary Care Physician: Efrem Cantrell MD    Admit date: 3/18/2021  Discharge date and time: No discharge date for patient encounter.   Discharged Condition: stable    Discharge Diagnoses:  Right renal stone  1.  Sepsis   2. Hypokalemia  3. Urolithiasis - stent 3/16  4. Acute kidney injury  5. Urinary tract infection, probable pyelonephritis.  Non-ST elevation MI type II  E coli pansesitive    Hospital Course: Christian Denney presented to [facility]    Patient admitted with severe sepsis because from UTI pyelonephritis.  Urology was consulted they placed stent and recommended further outpatient management.  Cultures from the urine grew E. coli pansensitive.  Patient was switched to oral antibiotics at discharge Levaquin for further 7 more days.  Patient will follow up with urology as per the recommendations.  She has Carvalho at discharge further managed by Dr. Zamorano urology.  All records reviewed discussed with patient.  Electrolytes were replaced prior to discharge.  Patient stable for discharge  Consults:   IP CONSULT TO UROLOGY  IP CONSULT TO HOSPITALIST  IP CONSULT TO PULMONOLOGY    Significant Diagnostic Studies:   Imaging Results (All)     Procedure Component Value Units Date/Time    XR Pyelogram Retrograde [[id number]] Collected: 21     Updated: 21    Narrative:        Patient: CHRISTIAN DENNEY  Time Out: 00:37  Exam(s): FILM OTHER     EXAM:    XR Bone Survey, Complete    CLINICAL HISTORY:     RT STENT INSERTION  Reason for exam: RT STENT INSERTION.    TECHNIQUE:    Multiple views of the bones of the axial and appendicular skeleton.    COMPARISON:    No relevant prior studies available.    FINDINGS:    Bones joints:  No  acute findings.  No lytic or blastic lesions.    Soft tissues:  Unremarkable.    Tubes, lines and devices:  Right ureteral stent noted which appears to   be in appropriate position.  Contrast is seen within the right renal   collecting system.  Partially visualized enteric tube is noted.    Other findings:  Calcification seen within the right hemipelvis, which   is nonspecific.    IMPRESSION:         Right ureteral stent noted which appears to be in appropriate position.    Contrast is seen within the right renal collecting system.      Impression:          Electronically signed by Elan Sawant MD on 03-19-21 at 0037    CT Abdomen Pelvis Without Contrast [[id number]] Collected: 03/18/21 1905     Updated: 03/18/21 1950    Narrative:      CT ABDOMEN PELVIS WO CONTRAST-     Radiation dose reduction techniques were utilized, including automated  exposure control and exposure modulation based on body size.     CLINICAL: Vague abdominal pain, question sepsis.     COMPARISON: None.     FINDINGS: There is right-sided hydronephrosis and located just below the  right UPJ within the proximal ureter there is a 17 x 12 mm obstructing  calculus. There is a nominal amount of perinephric fat stranding along  the inferior pole of the right kidney. The right kidney appears enlarged  compared to the left. Within the right kidney there are 2 mm size  calculi. The right renal contour is normal. Diameter of the right ureter  is normal below the calculus. The bladder is collapsed. A 6 mm  left-sided renal calculus, the left kidney is otherwise normal, no  left-sided hydronephrosis.     Gross enlargement of the uterus with multiple fibroids, the largest is  calcified measuring 6.6 cm.     The liver, gallbladder, pancreas, spleen and adrenal glands are  satisfactory in appearance, the diameter of the aorta is normal.  Retroaortic left renal vein noted, anatomic variant. No large or small  bowel abnormality seen. The appendix is  normal.     CONCLUSION: Sizable, 17 x 12 mm calculus within the proximal right  ureter just below the UPJ causing hydronephrosis. Grossly in the arch  uterus with several fibroids noted.     Findings of this report called to Joanne Lantigua in the emergency room at  the time of completion, 7 PM.        This report was finalized on 3/18/2021 7:47 PM by Dr. Garth Mcfadden M.D.       XR Chest 1 View [[id number]] Collected: 03/18/21 1711     Updated: 03/18/21 1722    Narrative:      XR CHEST 1 VW-  03/18/2021     HISTORY: Shortness of breath.     Heart size is within normal limits. Lungs appear free of acute  infiltrates. Bones and soft tissues are unremarkable.       Impression:      No acute process.     This report was finalized on 3/18/2021 5:19 PM by Dr. Edwardo Mera M.D.             Discharge Exam:  Alert and oriented x 4, in NAD  Supple neck, midline trach  RRR, no m/r/g, no edema  Clear bilaterally, no wheezing, nonlabored  No clubbing or cyanosis     Disposition:  Home    Patient Instructions:   Follow-up Information     Efrem Cantrell MD .    Specialty: Internal Medicine  Contact information:  [id number] [place] [address]  [phone]                 Discharge Orders  DenneyMehnaz (MRN [id number])    levoFLOXacin (LEVAQUIN) tablet [[id number]] (Medications)     Order Class Sig Dispense Dispense As Written Refill    Normal Take 1 tablet by mouth Daily. 7 tablet  0    Exception code Route Start Date End Date Order Date     Oral 03/22/21 03/22/21 0945                    Discharge Medications      New Medications      Instructions Start Date   levoFLOXacin 500 MG tablet  Commonly known as: Levaquin   500 mg, Oral, Daily         Continue These Medications      Instructions Start Date   amLODIPine 2.5 MG tablet  Commonly known as: NORVASC   2.5 mg, Oral, Daily      ferrous sulfate 325 (65 FE) MG tablet   325 mg, Oral, Daily With Breakfast      multivitamin tablet tablet  Commonly known as: THERAGRAN    Oral, Daily             Total time spent discharging patient including evaluation, medication reconciliation, arranging follow up, and post hospitalization instructions and education total time exceeds 30 minutes.    Signed:  Lula Bloom MD  3/22/2021  09:45 EDT

## 2021-03-22 NOTE — PLAN OF CARE
Goal Outcome Evaluation:        Outcome Summary: Vss, afebrile, denies pain, good urine output, received IV antibiotic otherwise saline locked, ambulating in guillory, will continue to monitor.

## 2021-03-22 NOTE — OUTREACH NOTE
Prep Survey      Responses   [facility] facility patient discharged from?  [place]   Is LACE score < 7 ?  Yes   Emergency Room discharge w/ pulse ox?  No   Eligibility  [facility]   Date of Admission  03/18/21   Date of Discharge  03/22/21   Discharge Disposition  Home or Self Care   Discharge diagnosis  Cystoscopy with ureteral stent insertion   Does the patient have one of the following disease processes/diagnoses(primary or secondary)?  Other   Does the patient have Home health ordered?  No   Is there a DME ordered?  No   Prep survey completed?  Yes          Mildred Borges RN

## 2021-03-22 NOTE — PAYOR COMM NOTE
"Mehnaz Denney (60 y.o. Female)     DC SUMMARY FOR [id number]    [employer]CT [employer]  P# [phone]  F# [phone]      Date of Birth Social Security Number Address Home Phone MRN    1960  [address] [place] [address] [phone] [id number]    Faith Marital Status          Amish Single       Admission Date Admission Type Admitting Provider Attending Provider Department, Room/Bed    3/18/21 Emergency Froylan Castillo MD  [facility] [address] [place], P682/1    Discharge Date Discharge Disposition Discharge Destination        3/22/2021 Home or Self Care              Attending Provider: (none)   Allergies: No Known Allergies    Isolation: None   Infection: None   Code Status: CPR    Ht: 175.3 cm (69\")   Wt: 73.5 kg (162 lb 0.6 oz)    Admission Cmt: None   Principal Problem: None                Active Insurance as of 3/18/2021     Primary Coverage     Payor Plan Insurance Group Employer/Plan Group    [employer] BLUE [employer]OSS [employer] BLUE [employer]OSS BLUE [employer] PPO [id number]     Payor Plan Address Payor Plan Phone Number Payor Plan Fax Number Effective Dates    [address] [phone]  2019 - None Entered    [facility] [address]       Subscriber Name Subscriber Birth Date Member ID       MEHNAZ DENNEY 1960 [id number]                 Emergency Contacts      (Rel.) Home Phone Work Phone Mobile Phone    KIRSTIE DENNEY (Daughter) [phone] -- [phone]    AYANNA DENNEY (Daughter) [phone] -- [phone]               Discharge Summary      Lula Bloom MD at 21 0945            PHYSICIAN DISCHARGE SUMMARY                                                                        [facility]    Patient Identification:  Name: Mehnaz Denney  Age: 60 y.o.  Sex: female  :  1960  MRN: [id number]  Primary Care Physician: Efrem Cantrell MD    Admit date: 3/18/2021  Discharge date and time: No discharge date " for patient encounter.   Discharged Condition: stable    Discharge Diagnoses:  Right renal stone  1.  Sepsis   2. Hypokalemia  3. Urolithiasis - stent 3/16  4. Acute kidney injury  5. Urinary tract infection, probable pyelonephritis.  Non-ST elevation MI type II  E coli pansesitive    Hospital Course: Christian Denney presented to [facility]    Patient admitted with severe sepsis because from UTI pyelonephritis.  Urology was consulted they placed stent and recommended further outpatient management.  Cultures from the urine grew E. coli pansensitive.  Patient was switched to oral antibiotics at discharge Levaquin for further 7 more days.  Patient will follow up with urology as per the recommendations.  She has Carvalho at discharge further managed by Dr. Zamorano urology.  All records reviewed discussed with patient.  Electrolytes were replaced prior to discharge.  Patient stable for discharge  Consults:   IP CONSULT TO UROLOGY  IP CONSULT TO HOSPITALIST  IP CONSULT TO PULMONOLOGY    Significant Diagnostic Studies:   Imaging Results (All)     Procedure Component Value Units Date/Time    XR Pyelogram Retrograde [[id number]] Collected: 03/19/21 0038     Updated: 03/19/21 0038    Narrative:        Patient: CHRISTIAN DENNEY  Time Out: 00:37  Exam(s): FILM OTHER     EXAM:    XR Bone Survey, Complete    CLINICAL HISTORY:     RT STENT INSERTION  Reason for exam: RT STENT INSERTION.    TECHNIQUE:    Multiple views of the bones of the axial and appendicular skeleton.    COMPARISON:    No relevant prior studies available.    FINDINGS:    Bones joints:  No acute findings.  No lytic or blastic lesions.    Soft tissues:  Unremarkable.    Tubes, lines and devices:  Right ureteral stent noted which appears to   be in appropriate position.  Contrast is seen within the right renal   collecting system.  Partially visualized enteric tube is noted.    Other findings:  Calcification seen within the right hemipelvis,  which   is nonspecific.    IMPRESSION:         Right ureteral stent noted which appears to be in appropriate position.    Contrast is seen within the right renal collecting system.      Impression:          Electronically signed by Elan Sawant MD on 03-19-21 at 0037    CT Abdomen Pelvis Without Contrast [[id number]] Collected: 03/18/21 1905     Updated: 03/18/21 1950    Narrative:      CT ABDOMEN PELVIS WO CONTRAST-     Radiation dose reduction techniques were utilized, including automated  exposure control and exposure modulation based on body size.     CLINICAL: Vague abdominal pain, question sepsis.     COMPARISON: None.     FINDINGS: There is right-sided hydronephrosis and located just below the  right UPJ within the proximal ureter there is a 17 x 12 mm obstructing  calculus. There is a nominal amount of perinephric fat stranding along  the inferior pole of the right kidney. The right kidney appears enlarged  compared to the left. Within the right kidney there are 2 mm size  calculi. The right renal contour is normal. Diameter of the right ureter  is normal below the calculus. The bladder is collapsed. A 6 mm  left-sided renal calculus, the left kidney is otherwise normal, no  left-sided hydronephrosis.     Gross enlargement of the uterus with multiple fibroids, the largest is  calcified measuring 6.6 cm.     The liver, gallbladder, pancreas, spleen and adrenal glands are  satisfactory in appearance, the diameter of the aorta is normal.  Retroaortic left renal vein noted, anatomic variant. No large or small  bowel abnormality seen. The appendix is normal.     CONCLUSION: Sizable, 17 x 12 mm calculus within the proximal right  ureter just below the UPJ causing hydronephrosis. Grossly in the arch  uterus with several fibroids noted.     Findings of this report called to Joanne Lantigua in the emergency room at  the time of completion, 7 PM.        This report was finalized on 3/18/2021 7:47 PM by Dr. Liang  ZAKIA Mcfadden       XR Chest 1 View [[id number]] Collected: 03/18/21 1711     Updated: 03/18/21 1722    Narrative:      XR CHEST 1 VW-  03/18/2021     HISTORY: Shortness of breath.     Heart size is within normal limits. Lungs appear free of acute  infiltrates. Bones and soft tissues are unremarkable.       Impression:      No acute process.     This report was finalized on 3/18/2021 5:19 PM by Dr. Edwardo Mera M.D.             Discharge Exam:  Alert and oriented x 4, in NAD  Supple neck, midline trach  RRR, no m/r/g, no edema  Clear bilaterally, no wheezing, nonlabored  No clubbing or cyanosis     Disposition:  Home    Patient Instructions:   Follow-up Information     Efrem Cantrell MD .    Specialty: Internal Medicine  Contact information:  [address] [place] [address]  [phone]                 Discharge Orders  Mehnaz Denney (MRN [id number])    levoFLOXacin (LEVAQUIN) tablet [[id number]] (Medications)     Order Class Sig Dispense Dispense As Written Refill    Normal Take 1 tablet by mouth Daily. 7 tablet  0    Exception code Route Start Date End Date Order Date     Oral 03/22/21 03/22/21 0945                    Discharge Medications      New Medications      Instructions Start Date   levoFLOXacin 500 MG tablet  Commonly known as: Levaquin   500 mg, Oral, Daily         Continue These Medications      Instructions Start Date   amLODIPine 2.5 MG tablet  Commonly known as: NORVASC   2.5 mg, Oral, Daily      ferrous sulfate 325 (65 FE) MG tablet   325 mg, Oral, Daily With Breakfast      multivitamin tablet tablet  Commonly known as: THERAGRAN   Oral, Daily             Total time spent discharging patient including evaluation, medication reconciliation, arranging follow up, and post hospitalization instructions and education total time exceeds 30 minutes.    Signed:  Lula Bloom MD  3/22/2021  09:45 EDT    Electronically signed by Lula Bloom MD at 03/22/21 0560

## 2021-03-22 NOTE — PROGRESS NOTES
Discharge Planning Assessment  [facility]     Patient Name: Mehnaz Denney  MRN: [id number]  Today's Date: 3/22/2021    Admit Date: 3/18/2021    Discharge Needs Assessment     Row Name 03/22/21 0926       Living Environment    Lives With  alone    Current Living Arrangements  home/apartment/condo    Primary Care Provided by  self    Provides Primary Care For  no one    Family Caregiver if Needed  child(jose), adult    Quality of Family Relationships  helpful;involved;supportive    Able to Return to Prior Arrangements  yes       Resource/Environmental Concerns    Resource/Environmental Concerns  none       Transition Planning    Patient/Family Anticipates Transition to  home    Transportation Anticipated  family or friend will provide       Discharge Needs Assessment    Equipment Currently Used at Home  none    Equipment Needed After Discharge  none    Provided Post Acute Provider List?  N/A    N/A Provider List Comment  Denies needs. Plan is home        Discharge Plan     Row Name 03/22/21 0926       Plan    Plan  Home no needs    Patient/Family in Agreement with Plan  yes    Plan Comments  Introduced self and role of CCP. Facesheet verified. Patient lives alone in a first floor apartment. There are no steps to enter and there are no steps inside. Prior to admission, patient was independent with ADL's, worked full time and drove. Uses no DMEs. Has never used a HH agency nor been to a rehab facility. DC plans is to return home. Stated her daughters will be staying with her and assisting as needed. Daughters will also provide transportation at DC. Denies any needs/equipment. Poss DC today.        Continued Care and Services - Admitted Since 3/18/2021    Coordination has not been started for this encounter.         Demographic Summary     Row Name 03/22/21 0924       General Information    Admission Type  inpatient    Arrived From  home    Reason for Consult  discharge planning     Used During This  Interaction  no        Functional Status     Row Name 03/22/21 0925       Functional Status    Usual Activity Tolerance  good    Current Activity Tolerance  good       Functional Status, IADL    Medications  independent    Meal Preparation  independent    Housekeeping  independent    Laundry  independent    Shopping  independent       Mental Status    General Appearance WDL  WDL        Psychosocial     Row Name 03/22/21 0925       Values/Beliefs    Spiritual, Cultural Beliefs, Voodoo Practices, Values that Affect Care  no       Behavior WDL    Behavior WDL  WDL       Emotion Mood WDL    Emotion/Mood/Affect WDL  WDL       Speech WDL    Speech WDL  WDL       Perceptual State WDL    Perceptual State WDL  WDL       Intellectual Performance WDL    Level of Consciousness  Alert       Coping/Stress    Sources of Support  adult child(jose)    Reaction to Health Status  adjusting    Understanding of Condition and Treatment  adequate understanding of treatment       Developmental Stage (Eriksson's)    Developmental Stage  Stage 7 (35-65 years/Middle Adulthood) Generativity vs. Stagnation        Abuse/Neglect     Row Name 03/22/21 0925       Personal Safety    Feels Unsafe at Home or Work/School  no    Feels Threatened by Someone  no    Does Anyone Try to Keep You From Having Contact with Others or Doing Things Outside Your Home?  no    Physical Signs of Abuse Present  no        Jennifer Prater, RN

## 2021-03-22 NOTE — PROGRESS NOTES
Case Management Discharge Note      Final Note: Discharged home. Jennifer Prater RN    Provided Post Acute Provider List?: N/A  N/A Provider List Comment: Denies needs. Plan is home    Transportation Services  Private: Car    Final Discharge Disposition Code: 01 - home or self-care

## 2021-03-23 ENCOUNTER — TRANSITIONAL CARE MANAGEMENT TELEPHONE ENCOUNTER (OUTPATIENT)
Dept: CALL CENTER | Facility: HOSPITAL | Age: 61
End: 2021-03-23

## 2021-03-23 NOTE — OUTREACH NOTE
Call Center TCM Note      Responses   [facility] patient discharged from?  [place]   Does the patient have one of the following disease processes/diagnoses(primary or secondary)?  Other   TCM attempt successful?  Yes [Samreen]   Call start time  1224   Call end time  1229   Discharge diagnosis  Cystoscopy with ureteral stent insertion   Person spoke with today (if not patient) and relationship  Samreen-daughter and patient    Meds reviewed with patient/caregiver?  Yes   Is the patient having any side effects they believe may be caused by any medication additions or changes?  Yes   Side effects comments   salty taste in mouth    Does the patient have all medications ordered at discharge?  Yes   Is the patient taking all medications as directed (includes completed medication regime)?  Yes   Does the patient have a primary care provider?   Yes   Comments regarding PCP  Hospital d/c f/u appt is on 3/25/21 at 1:15 pm    Has the patient kept scheduled appointments due by today?  N/A   Psychosocial issues?  No   Did the patient receive a copy of their discharge instructions?  Yes   Nursing interventions  Reviewed instructions with patient   What is the patient's perception of their health status since discharge?  Improving   Is the patient/caregiver able to teach back signs and symptoms related to disease process for when to call PCP?  Yes   Is the patient/caregiver able to teach back signs and symptoms related to disease process for when to call 911?  Yes   Is the patient/caregiver able to teach back the hierarchy of who to call/visit for symptoms/problems? PCP, Specialist, Home health nurse, Urgent Care, ED, 911  Yes   If the patient is a current smoker, are they able to teach back resources for cessation?  Not a smoker   TCM call completed?  Yes          Martine Cruz RN    3/23/2021, 12:29 EDT

## 2021-03-24 ENCOUNTER — TRANSCRIBE ORDERS (OUTPATIENT)
Dept: LAB | Facility: HOSPITAL | Age: 61
End: 2021-03-24

## 2021-03-24 ENCOUNTER — BULK ORDERING (OUTPATIENT)
Dept: CASE MANAGEMENT | Facility: OTHER | Age: 61
End: 2021-03-24

## 2021-03-24 DIAGNOSIS — Z01.818 OTHER SPECIFIED PRE-OPERATIVE EXAMINATION: Primary | ICD-10-CM

## 2021-03-24 DIAGNOSIS — Z23 IMMUNIZATION DUE: ICD-10-CM

## 2021-03-25 ENCOUNTER — OFFICE VISIT (OUTPATIENT)
Dept: FAMILY MEDICINE CLINIC | Facility: CLINIC | Age: 61
End: 2021-03-25

## 2021-03-25 VITALS
DIASTOLIC BLOOD PRESSURE: 80 MMHG | HEART RATE: 90 BPM | SYSTOLIC BLOOD PRESSURE: 150 MMHG | OXYGEN SATURATION: 98 % | HEIGHT: 69 IN | RESPIRATION RATE: 18 BRPM | BODY MASS INDEX: 23.34 KG/M2 | WEIGHT: 157.6 LBS | TEMPERATURE: 97.1 F

## 2021-03-25 DIAGNOSIS — D50.8 IRON DEFICIENCY ANEMIA SECONDARY TO INADEQUATE DIETARY IRON INTAKE: ICD-10-CM

## 2021-03-25 DIAGNOSIS — I10 ESSENTIAL HYPERTENSION: Primary | ICD-10-CM

## 2021-03-25 DIAGNOSIS — D50.0 IRON DEFICIENCY ANEMIA DUE TO CHRONIC BLOOD LOSS: ICD-10-CM

## 2021-03-25 DIAGNOSIS — E78.00 HYPERCHOLESTEROLEMIA: ICD-10-CM

## 2021-03-25 PROCEDURE — 99214 OFFICE O/P EST MOD 30 MIN: CPT | Performed by: INTERNAL MEDICINE

## 2021-03-25 RX ORDER — ROSUVASTATIN CALCIUM 10 MG/1
10 TABLET, COATED ORAL DAILY
Qty: 90 TABLET | Refills: 3 | Status: SHIPPED | OUTPATIENT
Start: 2021-03-25 | End: 2022-03-21

## 2021-03-25 NOTE — PROGRESS NOTES
Subjective   Mehnaz Denney is a 60 y.o. female. Patient is here today for   Chief Complaint   Patient presents with   • Hyperlipidemia     fu labs   • Flank Pain     discharged bhe 3/22/21          Vitals:    03/25/21 1315   BP: 150/80   Pulse: 90   Resp: 18   Temp: 97.1 °F (36.2 °C)   SpO2: 98%     Body mass index is 23.26 kg/m².      Past Medical History:   Diagnosis Date   • History of anemia    • Hyperlipidemia    • Hypertension    • PONV (postoperative nausea and vomiting)    • Post-menopause bleeding    • Uterine fibroid       No Known Allergies   Social History     Socioeconomic History   • Marital status: Single     Spouse name: Not on file   • Number of children: Not on file   • Years of education: Not on file   • Highest education level: Not on file   Tobacco Use   • Smoking status: Never Smoker   • Smokeless tobacco: Never Used   Substance and Sexual Activity   • Alcohol use: Yes     Alcohol/week: 1.0 standard drinks     Types: 1 Glasses of wine per week     Comment: Occ//Caffeine use: 2-3 cups daily   • Drug use: Defer   • Sexual activity: Defer     Partners: Male     Birth control/protection: Tubal ligation        Current Outpatient Medications:   •  amLODIPine (NORVASC) 2.5 MG tablet, Take 1 tablet by mouth Daily., Disp: 90 tablet, Rfl: 3  •  ferrous sulfate 325 (65 FE) MG tablet, Take 1 tablet by mouth Daily With Breakfast., Disp: 90 tablet, Rfl: 1  •  levoFLOXacin (Levaquin) 500 MG tablet, Take 1 tablet by mouth Daily., Disp: 7 tablet, Rfl: 0  •  Multiple Vitamin (MULTI-VITAMIN DAILY PO), Take  by mouth Daily., Disp: , Rfl:   •  rosuvastatin (Crestor) 10 MG tablet, Take 1 tablet by mouth Daily., Disp: 90 tablet, Rfl: 3     Objective     She is here today to follow-up on labs done 2 weeks ago, however she was in the hospital last week with aloe nephritis.  She had a stent placed, she has a Carvalho catheter in place, she is going to follow-up with urology in the next week or so.    She tells me that  she has not had any fevers, chills, diarrhea, chest pain etc.    She actually feels pretty well.       Review of Systems   Constitutional: Negative.    HENT: Negative.    Respiratory: Negative.    Cardiovascular: Negative.    Musculoskeletal: Negative.    Psychiatric/Behavioral: Negative.        Physical Exam  Vitals and nursing note reviewed.   Constitutional:       Appearance: Normal appearance.      Comments: Pleasant, neatly groomed, in no distress.   Cardiovascular:      Rate and Rhythm: Regular rhythm.      Heart sounds: Normal heart sounds. No murmur heard.   No gallop.    Pulmonary:      Effort: No respiratory distress.      Breath sounds: Normal breath sounds. No wheezing or rales.   Neurological:      Mental Status: She is alert and oriented to person, place, and time.   Psychiatric:         Mood and Affect: Mood normal.         Behavior: Behavior normal.         Thought Content: Thought content normal.         Judgment: Judgment normal.           Problems Addressed this Visit        Cardiac and Vasculature    Essential hypertension - Primary    Hypercholesterolemia    Relevant Medications    rosuvastatin (Crestor) 10 MG tablet       Hematology and Neoplasia    Iron deficiency anemia due to chronic blood loss    Iron deficiency anemia secondary to inadequate dietary iron intake      Diagnoses       Codes Comments    Essential hypertension    -  Primary ICD-10-CM: I10  ICD-9-CM: 401.9     Iron deficiency anemia due to chronic blood loss     ICD-10-CM: D50.0  ICD-9-CM: 280.0     Iron deficiency anemia secondary to inadequate dietary iron intake     ICD-10-CM: D50.8  ICD-9-CM: 280.1     Hypercholesterolemia     ICD-10-CM: E78.00  ICD-9-CM: 272.0             PLAN  She and I reviewed her labs.  Her cholesterol is too high.  Would like to see her LDL cholesterol less than 130.    I would like to see it less than 100 if she were smoker.  Her chart indicates that she does not smoke but she smells like cigarettes  today.    Like to have her LDL cholesterol far less than 130 in any case.    I am to start her on rosuvastatin 10 mg 1 p.o. nightly.    I like to have her back in about 3 months to recheck a PT and INR.    She should continue taking her supplemental daily iron.          No follow-ups on file.

## 2021-03-31 ENCOUNTER — APPOINTMENT (OUTPATIENT)
Dept: PREADMISSION TESTING | Facility: HOSPITAL | Age: 61
End: 2021-03-31

## 2021-04-09 ENCOUNTER — LAB (OUTPATIENT)
Dept: LAB | Facility: HOSPITAL | Age: 61
End: 2021-04-09

## 2021-04-09 DIAGNOSIS — Z01.818 OTHER SPECIFIED PRE-OPERATIVE EXAMINATION: ICD-10-CM

## 2021-04-09 PROCEDURE — C9803 HOPD COVID-19 SPEC COLLECT: HCPCS

## 2021-04-09 PROCEDURE — U0004 COV-19 TEST NON-CDC HGH THRU: HCPCS

## 2021-04-10 LAB — SARS-COV-2 ORF1AB RESP QL NAA+PROBE: NOT DETECTED

## 2021-04-12 ENCOUNTER — ANESTHESIA EVENT (OUTPATIENT)
Dept: PERIOP | Facility: HOSPITAL | Age: 61
End: 2021-04-12

## 2021-04-12 ENCOUNTER — ANESTHESIA (OUTPATIENT)
Dept: PERIOP | Facility: HOSPITAL | Age: 61
End: 2021-04-12

## 2021-04-12 ENCOUNTER — HOSPITAL ENCOUNTER (OUTPATIENT)
Facility: HOSPITAL | Age: 61
Setting detail: HOSPITAL OUTPATIENT SURGERY
Discharge: HOME | End: 2021-04-12
Attending: UROLOGY | Admitting: UROLOGY

## 2021-04-12 VITALS
HEART RATE: 87 BPM | BODY MASS INDEX: 22.01 KG/M2 | HEIGHT: 69 IN | SYSTOLIC BLOOD PRESSURE: 132 MMHG | OXYGEN SATURATION: 98 % | RESPIRATION RATE: 16 BRPM | WEIGHT: 148.59 LBS | DIASTOLIC BLOOD PRESSURE: 79 MMHG | TEMPERATURE: 97.7 F

## 2021-04-12 DIAGNOSIS — N20.0 RIGHT RENAL STONE: Primary | ICD-10-CM

## 2021-04-12 PROCEDURE — 25010000002 FENTANYL CITRATE (PF) 100 MCG/2ML SOLUTION: Performed by: NURSE ANESTHETIST, CERTIFIED REGISTERED

## 2021-04-12 PROCEDURE — 25010000002 DEXAMETHASONE PER 1 MG: Performed by: NURSE ANESTHETIST, CERTIFIED REGISTERED

## 2021-04-12 PROCEDURE — 25010000002 MIDAZOLAM PER 1 MG: Performed by: ANESTHESIOLOGY

## 2021-04-12 PROCEDURE — 25010000002 KETOROLAC TROMETHAMINE PER 15 MG: Performed by: NURSE ANESTHETIST, CERTIFIED REGISTERED

## 2021-04-12 PROCEDURE — 25010000002 ONDANSETRON PER 1 MG: Performed by: NURSE ANESTHETIST, CERTIFIED REGISTERED

## 2021-04-12 PROCEDURE — 25010000002 PROPOFOL 10 MG/ML EMULSION: Performed by: NURSE ANESTHETIST, CERTIFIED REGISTERED

## 2021-04-12 RX ORDER — LIDOCAINE HYDROCHLORIDE 10 MG/ML
0.5 INJECTION, SOLUTION EPIDURAL; INFILTRATION; INTRACAUDAL; PERINEURAL ONCE AS NEEDED
Status: DISCONTINUED | OUTPATIENT
Start: 2021-04-12 | End: 2021-04-12 | Stop reason: HOSPADM

## 2021-04-12 RX ORDER — LABETALOL HYDROCHLORIDE 5 MG/ML
5 INJECTION, SOLUTION INTRAVENOUS
Status: DISCONTINUED | OUTPATIENT
Start: 2021-04-12 | End: 2021-04-12 | Stop reason: HOSPADM

## 2021-04-12 RX ORDER — SODIUM CHLORIDE, SODIUM LACTATE, POTASSIUM CHLORIDE, CALCIUM CHLORIDE 600; 310; 30; 20 MG/100ML; MG/100ML; MG/100ML; MG/100ML
9 INJECTION, SOLUTION INTRAVENOUS CONTINUOUS
Status: DISCONTINUED | OUTPATIENT
Start: 2021-04-12 | End: 2021-04-12 | Stop reason: HOSPADM

## 2021-04-12 RX ORDER — HYDROMORPHONE HYDROCHLORIDE 1 MG/ML
0.5 INJECTION, SOLUTION INTRAMUSCULAR; INTRAVENOUS; SUBCUTANEOUS
Status: DISCONTINUED | OUTPATIENT
Start: 2021-04-12 | End: 2021-04-12 | Stop reason: HOSPADM

## 2021-04-12 RX ORDER — EPHEDRINE SULFATE 50 MG/ML
5 INJECTION, SOLUTION INTRAVENOUS ONCE AS NEEDED
Status: DISCONTINUED | OUTPATIENT
Start: 2021-04-12 | End: 2021-04-12 | Stop reason: HOSPADM

## 2021-04-12 RX ORDER — PROMETHAZINE HYDROCHLORIDE 25 MG/1
25 SUPPOSITORY RECTAL ONCE AS NEEDED
Status: DISCONTINUED | OUTPATIENT
Start: 2021-04-12 | End: 2021-04-12 | Stop reason: HOSPADM

## 2021-04-12 RX ORDER — SCOLOPAMINE TRANSDERMAL SYSTEM 1 MG/1
1 PATCH, EXTENDED RELEASE TRANSDERMAL ONCE
Status: DISCONTINUED | OUTPATIENT
Start: 2021-04-12 | End: 2021-04-12 | Stop reason: HOSPADM

## 2021-04-12 RX ORDER — NALOXONE HCL 0.4 MG/ML
0.2 VIAL (ML) INJECTION AS NEEDED
Status: DISCONTINUED | OUTPATIENT
Start: 2021-04-12 | End: 2021-04-12 | Stop reason: HOSPADM

## 2021-04-12 RX ORDER — LIDOCAINE HYDROCHLORIDE 20 MG/ML
INJECTION, SOLUTION INFILTRATION; PERINEURAL AS NEEDED
Status: DISCONTINUED | OUTPATIENT
Start: 2021-04-12 | End: 2021-04-12 | Stop reason: SURG

## 2021-04-12 RX ORDER — MIDAZOLAM HYDROCHLORIDE 1 MG/ML
1 INJECTION INTRAMUSCULAR; INTRAVENOUS
Status: DISCONTINUED | OUTPATIENT
Start: 2021-04-12 | End: 2021-04-12 | Stop reason: HOSPADM

## 2021-04-12 RX ORDER — OXYCODONE HYDROCHLORIDE AND ACETAMINOPHEN 5; 325 MG/1; MG/1
1 TABLET ORAL EVERY 6 HOURS PRN
Qty: 20 TABLET | Refills: 0 | Status: SHIPPED | OUTPATIENT
Start: 2021-04-12 | End: 2021-04-22

## 2021-04-12 RX ORDER — IPRATROPIUM BROMIDE AND ALBUTEROL SULFATE 2.5; .5 MG/3ML; MG/3ML
3 SOLUTION RESPIRATORY (INHALATION) ONCE AS NEEDED
Status: DISCONTINUED | OUTPATIENT
Start: 2021-04-12 | End: 2021-04-12 | Stop reason: HOSPADM

## 2021-04-12 RX ORDER — FENTANYL CITRATE 50 UG/ML
INJECTION, SOLUTION INTRAMUSCULAR; INTRAVENOUS AS NEEDED
Status: DISCONTINUED | OUTPATIENT
Start: 2021-04-12 | End: 2021-04-12 | Stop reason: SURG

## 2021-04-12 RX ORDER — ONDANSETRON 2 MG/ML
INJECTION INTRAMUSCULAR; INTRAVENOUS AS NEEDED
Status: DISCONTINUED | OUTPATIENT
Start: 2021-04-12 | End: 2021-04-12 | Stop reason: SURG

## 2021-04-12 RX ORDER — CEFAZOLIN SODIUM 2 G/100ML
2 INJECTION, SOLUTION INTRAVENOUS
Status: DISCONTINUED | OUTPATIENT
Start: 2021-04-13 | End: 2021-04-12 | Stop reason: HOSPADM

## 2021-04-12 RX ORDER — PROMETHAZINE HYDROCHLORIDE 25 MG/1
25 TABLET ORAL ONCE AS NEEDED
Status: DISCONTINUED | OUTPATIENT
Start: 2021-04-12 | End: 2021-04-12 | Stop reason: HOSPADM

## 2021-04-12 RX ORDER — DEXAMETHASONE SODIUM PHOSPHATE 10 MG/ML
INJECTION INTRAMUSCULAR; INTRAVENOUS AS NEEDED
Status: DISCONTINUED | OUTPATIENT
Start: 2021-04-12 | End: 2021-04-12 | Stop reason: SURG

## 2021-04-12 RX ORDER — DIPHENHYDRAMINE HYDROCHLORIDE 50 MG/ML
12.5 INJECTION INTRAMUSCULAR; INTRAVENOUS
Status: DISCONTINUED | OUTPATIENT
Start: 2021-04-12 | End: 2021-04-12 | Stop reason: HOSPADM

## 2021-04-12 RX ORDER — ONDANSETRON 2 MG/ML
4 INJECTION INTRAMUSCULAR; INTRAVENOUS ONCE AS NEEDED
Status: DISCONTINUED | OUTPATIENT
Start: 2021-04-12 | End: 2021-04-12 | Stop reason: HOSPADM

## 2021-04-12 RX ORDER — FENTANYL CITRATE 50 UG/ML
50 INJECTION, SOLUTION INTRAMUSCULAR; INTRAVENOUS
Status: DISCONTINUED | OUTPATIENT
Start: 2021-04-12 | End: 2021-04-12 | Stop reason: HOSPADM

## 2021-04-12 RX ORDER — SODIUM CHLORIDE 0.9 % (FLUSH) 0.9 %
3-10 SYRINGE (ML) INJECTION AS NEEDED
Status: DISCONTINUED | OUTPATIENT
Start: 2021-04-12 | End: 2021-04-12 | Stop reason: HOSPADM

## 2021-04-12 RX ORDER — SODIUM CHLORIDE 0.9 % (FLUSH) 0.9 %
3 SYRINGE (ML) INJECTION EVERY 12 HOURS SCHEDULED
Status: DISCONTINUED | OUTPATIENT
Start: 2021-04-12 | End: 2021-04-12 | Stop reason: HOSPADM

## 2021-04-12 RX ORDER — SODIUM CHLORIDE, SODIUM LACTATE, POTASSIUM CHLORIDE, CALCIUM CHLORIDE 600; 310; 30; 20 MG/100ML; MG/100ML; MG/100ML; MG/100ML
100 INJECTION, SOLUTION INTRAVENOUS CONTINUOUS
Status: DISCONTINUED | OUTPATIENT
Start: 2021-04-12 | End: 2021-04-12 | Stop reason: HOSPADM

## 2021-04-12 RX ORDER — FAMOTIDINE 10 MG/ML
20 INJECTION, SOLUTION INTRAVENOUS ONCE
Status: COMPLETED | OUTPATIENT
Start: 2021-04-12 | End: 2021-04-12

## 2021-04-12 RX ORDER — OXYCODONE AND ACETAMINOPHEN 7.5; 325 MG/1; MG/1
1 TABLET ORAL ONCE AS NEEDED
Status: DISCONTINUED | OUTPATIENT
Start: 2021-04-12 | End: 2021-04-12 | Stop reason: HOSPADM

## 2021-04-12 RX ORDER — ONDANSETRON 4 MG/1
4 TABLET, FILM COATED ORAL EVERY 8 HOURS PRN
Qty: 10 TABLET | Refills: 0 | Status: SHIPPED | OUTPATIENT
Start: 2021-04-12 | End: 2021-05-12

## 2021-04-12 RX ORDER — DIPHENHYDRAMINE HCL 25 MG
25 CAPSULE ORAL
Status: DISCONTINUED | OUTPATIENT
Start: 2021-04-12 | End: 2021-04-12 | Stop reason: HOSPADM

## 2021-04-12 RX ORDER — PHENAZOPYRIDINE HYDROCHLORIDE 100 MG/1
100 TABLET, FILM COATED ORAL 3 TIMES DAILY PRN
Qty: 10 TABLET | Refills: 1 | Status: SHIPPED | OUTPATIENT
Start: 2021-04-12 | End: 2021-05-12

## 2021-04-12 RX ORDER — PROPOFOL 10 MG/ML
VIAL (ML) INTRAVENOUS AS NEEDED
Status: DISCONTINUED | OUTPATIENT
Start: 2021-04-12 | End: 2021-04-12 | Stop reason: SURG

## 2021-04-12 RX ORDER — KETOROLAC TROMETHAMINE 30 MG/ML
INJECTION, SOLUTION INTRAMUSCULAR; INTRAVENOUS AS NEEDED
Status: DISCONTINUED | OUTPATIENT
Start: 2021-04-12 | End: 2021-04-12 | Stop reason: SURG

## 2021-04-12 RX ORDER — HYDROCODONE BITARTRATE AND ACETAMINOPHEN 7.5; 325 MG/1; MG/1
1 TABLET ORAL ONCE AS NEEDED
Status: DISCONTINUED | OUTPATIENT
Start: 2021-04-12 | End: 2021-04-12 | Stop reason: HOSPADM

## 2021-04-12 RX ORDER — MIDAZOLAM HYDROCHLORIDE 1 MG/ML
2 INJECTION INTRAMUSCULAR; INTRAVENOUS
Status: DISCONTINUED | OUTPATIENT
Start: 2021-04-12 | End: 2021-04-12 | Stop reason: HOSPADM

## 2021-04-12 RX ORDER — FLUMAZENIL 0.1 MG/ML
0.2 INJECTION INTRAVENOUS AS NEEDED
Status: DISCONTINUED | OUTPATIENT
Start: 2021-04-12 | End: 2021-04-12 | Stop reason: HOSPADM

## 2021-04-12 RX ORDER — CEPHALEXIN 500 MG/1
500 CAPSULE ORAL 2 TIMES DAILY
Qty: 6 CAPSULE | Refills: 0 | Status: SHIPPED | OUTPATIENT
Start: 2021-04-12 | End: 2021-04-15

## 2021-04-12 RX ADMIN — LIDOCAINE HYDROCHLORIDE 50 MG: 20 INJECTION, SOLUTION INFILTRATION; PERINEURAL at 10:13

## 2021-04-12 RX ADMIN — MIDAZOLAM 1 MG: 1 INJECTION INTRAMUSCULAR; INTRAVENOUS at 09:07

## 2021-04-12 RX ADMIN — DEXAMETHASONE SODIUM PHOSPHATE 8 MG: 10 INJECTION INTRAMUSCULAR; INTRAVENOUS at 10:08

## 2021-04-12 RX ADMIN — SODIUM CHLORIDE, POTASSIUM CHLORIDE, SODIUM LACTATE AND CALCIUM CHLORIDE 9 ML/HR: 600; 310; 30; 20 INJECTION, SOLUTION INTRAVENOUS at 09:06

## 2021-04-12 RX ADMIN — KETOROLAC TROMETHAMINE 30 MG: 30 INJECTION, SOLUTION INTRAMUSCULAR at 10:45

## 2021-04-12 RX ADMIN — PROPOFOL 150 MG: 10 INJECTION, EMULSION INTRAVENOUS at 10:13

## 2021-04-12 RX ADMIN — ONDANSETRON 4 MG: 2 INJECTION INTRAMUSCULAR; INTRAVENOUS at 10:08

## 2021-04-12 RX ADMIN — FAMOTIDINE 20 MG: 10 INJECTION INTRAVENOUS at 09:06

## 2021-04-12 RX ADMIN — FENTANYL CITRATE 100 MCG: 50 INJECTION INTRAMUSCULAR; INTRAVENOUS at 10:08

## 2021-04-12 NOTE — ANESTHESIA PREPROCEDURE EVALUATION
" Anesthesia Evaluation     Patient summary reviewed   history of anesthetic complications:  NPO Solid Status: > 8 hours  NPO Liquid Status: > 2 hours           Airway   Mallampati: III  TM distance: >3 FB  Neck ROM: full  Possible difficult intubation  Comment: Note from last intubation: \"Pt has anterior airway and a couple loose teeth.  Was unable to obtain a view with MAC 3.  Used CMAC and obtained a grade II view with cricoid pressure.\"  Dental      Comment: Prominent upper central incisors.       Pulmonary     breath sounds clear to auscultation  (-) shortness of breath, not a smoker  Cardiovascular   Exercise tolerance: good (4-7 METS)    Rhythm: regular  Rate: normal    (+) hypertension, hyperlipidemia,   (-) angina, SHARMA      Neuro/Psych  GI/Hepatic/Renal/Endo    (+)   renal disease stones,     Musculoskeletal     Abdominal    Substance History      OB/GYN          Other                        Anesthesia Plan    ASA 2     general   (Consider having CMAC on hand)  intravenous induction     Anesthetic plan, all risks, benefits, and alternatives have been provided, discussed and informed consent has been obtained with: patient.      "

## 2021-04-12 NOTE — OP NOTE
Operative Note    4/12/2021    Mehnaz Denney  60 y.o.  1960  female  [id number]    Attending Surgeon: Nito Felix MD    Assistant(s): None    Pre-operative Diagnosis:   1. Right Nephrolithiasis    Post-operative Diagnosis: Same    Procedure:   1. Right ESWL (Staged procedure)    Indication:  Mehnaz Denney is a 60 year old woman with a large right UPJ stone. Presented on 3/18/2021 with sepsis, obstructing stone, and underwent Right stent placement. Here for definitive therapy.     We discussed the benefits/risks/expectations and the patient desires surgical intervention. Risks include bleeding, infection, urinary tract infection/sepsis, retained stone fragments, damage to adjacent tissues including the genitalia, chronic pain, numbness, incontinence, stroke, heart attack, death, and need for additional procedures.    Findings:   - Fluoroscopy demonstrateda radio-opaque stone at the right UPJ renal shadow, consistent with patient's known stone burden.   - The previous right ureteral stent is in place and appears to be in good position.  - Stones fragmented. 18 kV at 60 shocks/minute for 300 shocks. The rate was increased to 120 shocks/minute at rapidly escalating voltage to 24 kV.    Description of procedure:  After informed consent was obtained, the patient was taken to the OR and general anesthesia was induced. The patient was placed on the lithotripter table in a supine position and placed under general anesthesia. A timeout was performed and all team members were in agreement. Fluoroscopy demonstrateda radio-opaque stone at the right UPJ renal shadow, consistent with patient's known stone burden. The previous right ureteral stent is in place and appears to be in good position. The patient was prepped and draped.     Right ESWL was then performed. A total of 3000 shocks were administered to the stone at a max energy level 24kV. The stone appeared to fragment. The patient tolerated the procedure  well and was awakened from anesthesia in the OR. The patient was transferred to the recovery room in stable condition.    Complications: None    Specimens: None    Estimated Blood Loss: Minimal    Plan:   1. Cephalexin x 3 days   2. Follow-up with Dr. Nito Felix ([facility]) in 2 weeks with CT stone protocol- Schedulers messaged      Nito Felix MD  [facility]  General & Reconstructive Urology  Office: [phone]  Fax: [phone]

## 2021-04-12 NOTE — H&P
[facility] History & Physical      Patient Identification:  NAME:  Mehnaz Denney  Age:  60 y.o.   Sex:  female   :  1960   MRN:  [id number]       Chief complaint: Planned Procedure    History of present illness:  Mehnaz Denney is a 60 year old woman with a large right UPJ stone. Presented on 3/18/2021 with sepsis, obstructing stone, and underwent Right stent placemenet.  Here for definitive therapy.       Past medical history:  Past Medical History:   Diagnosis Date   • History of anemia    • Hyperlipidemia    • Hypertension    • PONV (postoperative nausea and vomiting)    • Post-menopause bleeding    • Uterine fibroid        Past surgical history:  Past Surgical History:   Procedure Laterality Date   •  SECTION      TWINS   • CYSTOSCOPY W/ URETERAL STENT PLACEMENT Right 3/18/2021    Procedure: CYSTOSCOPY, RIGHT  URETERAL STENT INSERTION, AND RUSH CATHETER PLACEMENT;  Surgeon: Remy Bowens MD;  Location: [facility];  Service: Urology;  Laterality: Right;   • D & C WITH SUCTION     • D & C WITH SUCTION     • LAPAROSCOPIC TUBAL LIGATION         Allergies:  Patient has no known allergies.    Home medications:  Medications Prior to Admission   Medication Sig Dispense Refill Last Dose   • amLODIPine (NORVASC) 2.5 MG tablet Take 1 tablet by mouth Daily. 90 tablet 3 2021 at 0700   • rosuvastatin (Crestor) 10 MG tablet Take 1 tablet by mouth Daily. 90 tablet 3 2021 at 0700   • ferrous sulfate 325 (65 FE) MG tablet Take 1 tablet by mouth Daily With Breakfast. 90 tablet 1 2021   • levoFLOXacin (Levaquin) 500 MG tablet Take 1 tablet by mouth Daily. 7 tablet 0 2021   • Multiple Vitamin (MULTI-VITAMIN DAILY PO) Take  by mouth Daily.   2021        Hospital medications:  [START ON 2021] ceFAZolin, 2 g, Intravenous, On Call to OR  famotidine, 20 mg, Intravenous, Once  Scopolamine, 1 patch, Transdermal, Once  sodium chloride, 3 mL, Intravenous,  Q12H      lactated ringers, 9 mL/hr      fentanyl  •  lidocaine PF 1%  •  midazolam **OR** midazolam  •  sodium chloride    Family history:  Family History   Adopted: Yes   Problem Relation Age of Onset   • Hypertension Mother    • Hypertension Father    • No Known Problems Daughter    • No Known Problems Daughter    • Malig Hyperthermia Neg Hx        Social history:  Social History     Tobacco Use   • Smoking status: Never Smoker   • Smokeless tobacco: Never Used   Substance Use Topics   • Alcohol use: Yes     Alcohol/week: 1.0 standard drinks     Types: 1 Glasses of wine per week     Comment: Occ//Caffeine use: 2-3 cups daily   • Drug use: Defer       REVIEW OF SYSTEMS:  Constitutional - Negative for fevers/chills  Eyes/Ears/Nose/Mouth/Throat - Negative for changes in vision  Cardiovascular - Negative for chest pain, dysrhythmia  Respiratory - Negative for dyspnea  Gastrointestinal - Negative for nausea or vomiting  Genitourinary - Negative for dysuria  Hematologic/Lymphatic - Negative for bruising  Skin - Negative for erythema  Endocrine - Negative for history of diabetes    Objective:  TMax 24 hours:   Temp (24hrs), Av.7 °F (36.5 °C), Min:97.7 °F (36.5 °C), Max:97.7 °F (36.5 °C)      Vitals Ranges:   Temp:  [97.7 °F (36.5 °C)] 97.7 °F (36.5 °C)  Heart Rate:  [96] 96  Resp:  [18] 18  BP: (132)/(87) 132/87    Intake/Output Last 3 shifts:  No intake/output data recorded.     Physical Exam:    General Appearance:    Alert, cooperative, NAD   HEENT:    No trauma, pupils reactive, hearing intact   Back:     No CVA tenderness   Lungs:     Respirations unlabored, no wheezing    Heart:    RRR, intact peripheral pulses   Abdomen:     Soft, NDNT, no masses, no guarding   :    Not examined   Extremities:   No edema, no deformity   Lymphatic:   No neck or groin LAD   Skin:   No bleeding, bruising or rashes   Neuro/Psych:   Orientation intact, mood/affect pleasant, no focal findings       Results review:   I reviewed  the patient's new clinical results.    Data review:  Lab Results (last 24 hours)     ** No results found for the last 24 hours. **           Imaging:  Imaging Results (Last 24 Hours)     ** No results found for the last 24 hours. **             Assessment:     Right UPJ stone, Large, 17mm  S/p Right ureteral stent 3/19/2021    We discussed the benefits/risks/expectations and the patient desires surgical intervention. Risks include bleeding, infection, urinary tract infection/sepsis, retained stone fragments, damage to adjacent tissues including the genitalia, chronic pain, numbness, incontinence, renal hematoma, stroke, heart attack, death, and need for additional procedures.    Plan:     - Proceed with Right Extracorporeal Shockwave Lithotripsy       Nito Felix MD  04/12/21  08:54 EDT

## 2021-04-12 NOTE — ANESTHESIA PROCEDURE NOTES
Airway  Urgency: elective    Date/Time: 4/12/2021 10:14 AM  Airway not difficult    General Information and Staff    Patient location during procedure: OR  Anesthesiologist: Tess Matamoros MD  CRNA: Mar Bass CRNA    Indications and Patient Condition  Indications for airway management: airway protection    Preoxygenated: yes  MILS maintained throughout  Mask difficulty assessment: 0 - not attempted    Final Airway Details  Final airway type: supraglottic airway      Successful airway: classic  Size 4    Number of attempts at approach: 1  Assessment: lips, teeth, and gum same as pre-op    Additional Comments  Placed with ease. Vent with ease. Teeth as pre-op. Secured to face.

## 2021-04-12 NOTE — ANESTHESIA POSTPROCEDURE EVALUATION
"Patient: Mehnaz Denney    Procedure Summary     Date: 04/12/21 Room / Location: [facility] OR  / [facility] MAIN OR    Anesthesia Start: 1007 Anesthesia Stop: 1054    Procedure: RIGHT EXTRACORPOREAL SHOCKWAVE LITHOTRIPSY (Right Flank) Diagnosis:     Surgeons: Nito Felix MD Provider: Tess Matamoros MD    Anesthesia Type: general ASA Status: 2          Anesthesia Type: general    Vitals  Vitals Value Taken Time   /76 04/12/21 1121   Temp 36.5 °C (97.7 °F) 04/12/21 1115   Pulse 94 04/12/21 1121   Resp 16 04/12/21 1121   SpO2 100 % 04/12/21 1121           Post Anesthesia Care and Evaluation    Patient location during evaluation: bedside  Patient participation: complete - patient participated  Level of consciousness: awake and alert  Pain management: adequate  Airway patency: patent  Anesthetic complications: No anesthetic complications    Cardiovascular status: acceptable  Respiratory status: acceptable  Hydration status: acceptable    Comments: /78   Pulse 87   Temp 36.5 °C (97.7 °F) (Oral)   Resp 16   Ht 175.3 cm (69\")   Wt 67.4 kg (148 lb 9.4 oz)   LMP  (LMP Unknown)   SpO2 99%   BMI 21.94 kg/m²           "

## 2021-05-14 ENCOUNTER — TELEPHONE (OUTPATIENT)
Dept: CARDIOLOGY | Facility: CLINIC | Age: 61
End: 2021-05-14

## 2021-05-20 ENCOUNTER — LAB REQUISITION (OUTPATIENT)
Dept: LAB | Facility: HOSPITAL | Age: 61
End: 2021-05-20

## 2021-05-20 DIAGNOSIS — N20.1 CALCULUS OF URETER: ICD-10-CM

## 2021-05-20 PROCEDURE — 82365 CALCULUS SPECTROSCOPY: CPT

## 2021-05-24 ENCOUNTER — IMMUNIZATION (OUTPATIENT)
Dept: VACCINE CLINIC | Facility: HOSPITAL | Age: 61
End: 2021-05-24

## 2021-05-24 PROCEDURE — 0001A: CPT | Performed by: INTERNAL MEDICINE

## 2021-05-24 PROCEDURE — 91300 HC SARSCOV02 VAC 30MCG/0.3ML IM: CPT | Performed by: INTERNAL MEDICINE

## 2021-05-27 LAB
COLOR STONE: NORMAL
COMPN STONE: NORMAL
HYDROXYAPATITE 24H ENGDIFF UR: 100 %
LABORATORY COMMENT REPORT: NORMAL
Lab: NORMAL
Lab: NORMAL
PHOTO: NORMAL
SIZE STONE: NORMAL MM
SPEC SOURCE SUBJ: NORMAL
WT STONE: 15 MG

## 2021-06-14 ENCOUNTER — APPOINTMENT (OUTPATIENT)
Dept: VACCINE CLINIC | Facility: HOSPITAL | Age: 61
End: 2021-06-14

## 2021-06-15 ENCOUNTER — IMMUNIZATION (OUTPATIENT)
Dept: VACCINE CLINIC | Facility: HOSPITAL | Age: 61
End: 2021-06-15

## 2021-06-15 PROCEDURE — 0002A: CPT | Performed by: INTERNAL MEDICINE

## 2021-06-15 PROCEDURE — 91300 HC SARSCOV02 VAC 30MCG/0.3ML IM: CPT | Performed by: INTERNAL MEDICINE

## 2021-06-17 ENCOUNTER — TELEMEDICINE (OUTPATIENT)
Dept: FAMILY MEDICINE CLINIC | Facility: TELEHEALTH | Age: 61
End: 2021-06-17

## 2021-06-17 DIAGNOSIS — H00.11 CHALAZION OF RIGHT UPPER EYELID: Primary | ICD-10-CM

## 2021-06-17 PROCEDURE — 99212 OFFICE O/P EST SF 10 MIN: CPT | Performed by: NURSE PRACTITIONER

## 2021-06-17 RX ORDER — OXYCODONE HYDROCHLORIDE AND ACETAMINOPHEN 5; 325 MG/1; MG/1
TABLET ORAL
COMMUNITY
Start: 2021-05-20 | End: 2021-07-21

## 2021-06-17 NOTE — PROGRESS NOTES
Chief Complaint  Eyelid Lesion    Subjective          Mehnaz Denney presents to [facility] for   Painless right eyelid lesion    Conjunctivitis   The current episode started more than 1 week ago. The onset was gradual. The problem occurs continuously. The problem has been unchanged. Nothing relieves the symptoms. Pertinent negatives include no decreased vision, no double vision, no eye itching, no eye discharge, no eye pain and no eye redness. The right eye is affected.       REVIEW OF SYSTEMS  History obtained from the patient  General ROS: negative  Ophthalmic ROS: positive for - right eyelid lesion  negative for - decreased vision, excessive tearing, eye pain, itchy eyes or loss of vision    Objective   Vital Signs:   There were no vitals taken for this visit.    Physical Exam  Constitutional:       General: She is not in acute distress.     Appearance: She is not ill-appearing.   HENT:      Nose: No congestion.   Eyes:      General: No visual field deficit.     Conjunctiva/sclera:      Right eye: Right conjunctiva is not injected. No exudate or hemorrhage.     Comments: Nodular lesion on right eyelid that is not red, painful, pruritic,    Neurological:      Mental Status: She is alert.        Result Review :                 Assessment and Plan    Problem List Items Addressed This Visit     None      Visit Diagnoses     Chalazion of right upper eyelid    -  Primary          This visit was performed via Telehealth.   Warm compresses to the eye, 3-4 times a day, 10-20 minutes. Massage eyelid lump with gentle fingertip pressure for 10 minutes. Resolution may take several weeks. Wash your hands often with soap and water. If symptoms do not improve in 6-8 weeks, see eye MD, sooner if eye starts to look irritated.       Follow Up   Return if symptoms worsen or fail to improve.  Patient was given instructions and counseling regarding her condition or for health maintenance advice.  Please see specific information pulled into the AVS if appropriate.

## 2021-06-17 NOTE — PATIENT INSTRUCTIONS
Warm compresses to the eye, 3-4 times a day, 10-20 minutes. Massage eyelid lump with gentle fingertip pressure for 10 minutes. Resolution may take several weeks. Wash your hands often with soap and water. If symptoms do not improve in 6-8 weeks, see eye MD, sooner if eye starts to look irritated.   Chalazion    A chalazion is a swelling or lump on the eyelid. It can affect the upper eyelid or the lower eyelid.  What are the causes?  This condition may be caused by:  · Long-lasting (chronic) inflammation of the eyelid glands.  · A blocked oil gland in the eyelid.  What are the signs or symptoms?  Symptoms of this condition include:  · Swelling of the eyelid. The swelling may spread to areas around the eye.  · A hard lump on the eyelid.  · Blurry vision. The lump on the eyelid may make it hard to see out of the eye.  How is this diagnosed?  This condition is diagnosed with an examination of the eye.  How is this treated?  This condition is treated by applying a warm compress to the eyelid. If the condition does not improve, it may be treated with:  · Medicine that is injected into the chalazion by a health care provider.  · Surgery.  · Medicine that is applied to the eye.  Follow these instructions at home:  Managing pain and swelling  · Apply a warm, moist compress to the eyelid 4-6 times a day for 10-15 minutes at a time. This will help to open any blocked glands and to reduce redness and swelling.  · Apply over-the-counter and prescription medicines only as told by your health care provider.  General instructions  · Do not touch the chalazion.  · Do not try to remove the pus. Do not squeeze the chalazion or stick it with a pin or needle.  · Do not rub your eyes.  · Wash your hands often. Dry your hands with a clean towel.  · Keep your face, scalp, and eyebrows clean.  · Avoid wearing eye makeup.  · If the chalazion does not break open (rupture) on its own, return to your health care provider.  · Keep all follow-up  appointments as told by your health care provider. This is important.  Contact a health care provider if:  · Your eyelid has not improved in 4 weeks.  · Your eyelid is getting worse.  · You have a fever.  · The chalazion does not rupture on its own after a month of home treatment.  Get help right away if:  · You have pain in your eye.  · Your vision changes.  · The chalazion becomes painful or red.  · The chalazion gets bigger.  Summary  · A chalazion is a swelling or lump on the upper or lower eyelid.  · It may be caused by chronic inflammation or a blocked oil gland.  · Apply a warm, moist compress to the eyelid 4-6 times a day for 10-15 minutes at a time.  · Keep your face, scalp, and eyebrows clean.  This information is not intended to replace advice given to you by your health care provider. Make sure you discuss any questions you have with your health care provider.  Document Revised: 06/06/2019 Document Reviewed: 06/06/2019  [employer] Patient Education © 2021 [employer]

## 2021-07-19 DIAGNOSIS — E78.00 HYPERCHOLESTEROLEMIA: ICD-10-CM

## 2021-07-19 DIAGNOSIS — N95.0 POSTMENOPAUSAL BLEEDING: Primary | ICD-10-CM

## 2021-07-21 ENCOUNTER — OFFICE VISIT (OUTPATIENT)
Dept: CARDIOLOGY | Facility: CLINIC | Age: 61
End: 2021-07-21

## 2021-07-21 VITALS
HEIGHT: 69 IN | OXYGEN SATURATION: 99 % | BODY MASS INDEX: 23.7 KG/M2 | HEART RATE: 94 BPM | WEIGHT: 160 LBS | RESPIRATION RATE: 16 BRPM | DIASTOLIC BLOOD PRESSURE: 84 MMHG | SYSTOLIC BLOOD PRESSURE: 124 MMHG

## 2021-07-21 DIAGNOSIS — N20.0 RIGHT RENAL STONE: ICD-10-CM

## 2021-07-21 DIAGNOSIS — N95.0 POSTMENOPAUSAL BLEEDING: ICD-10-CM

## 2021-07-21 DIAGNOSIS — D25.1 INTRAMURAL LEIOMYOMA OF UTERUS: ICD-10-CM

## 2021-07-21 DIAGNOSIS — I10 ESSENTIAL HYPERTENSION: Primary | ICD-10-CM

## 2021-07-21 DIAGNOSIS — E78.00 HYPERCHOLESTEROLEMIA: ICD-10-CM

## 2021-07-21 PROCEDURE — 99213 OFFICE O/P EST LOW 20 MIN: CPT | Performed by: NURSE PRACTITIONER

## 2021-07-21 NOTE — PROGRESS NOTES
Date of Office Visit: 2021  Encounter Provider: JUAN MANUEL Hill  Place of Service: [facility] CARDIOLOGY  Patient Name: Mehnaz Denney  :1960      Patient ID:  Mehnaz Denney is a 60 y.o. female is here for followup for hypertension. She is a new patient to me and I have reviewed her records in preparation for this visit.         History of Present Illness    She has a history of hypertension, hyperlipidemia, history of anemia for which she takes oral iron.  She was referred here for an abnormal ECG.     Labs on 2021 show normal BMP and CBC.  Lipids on 2020 showed total cholesterol 227, HDL 61, , triglycerides 77.  She had normal TSH done 2020.     She is single, has 2 children, works as a , does not smoke or use alcohol, has 2 to 3 cups of coffee per day.  Her mother and father both had hypertension.    She was scheduled to have a hysterectomy 2021 that had to be canceled due to an abnormal EKG.  She saw Dr. 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  Her EKG from 2020 showed the same abnormality.  She felt like her heart was racing with her lisinopril and hydrochlorothiazide so she was changed to amlodipine 2.5 mg daily.    Her echocardiogram 2021 revealed an ejection fraction of 66.9%, normal left ventricular systolic function, grade 1 left ventricular diastolic function, left ventricular wall thickness consistent with septal asymmetric hypertrophy, and mild anterior mitral leaflet thickening.  She has trace tricuspid valve regurgitation with a normal RVSP.  She was cleared for her hysterectomy from a cardiac standpoint.    She is here today to follow-up for hypertension.  She was scheduled again to have her hysterectomy in April, but unfortunately, it had to be canceled again due to a kidney  stone that she became septic with and was admitted to the hospital for 4 days.  She was hypotensive at that time and her blood pressure medications were held.  She was restarted upon discharge.  She is been feeling well since then.  She denies any chest pain or pressure.  She denies any shortness of breath, dyspnea on exertion, PND, orthopnea.  She has not had any lower extremity edema.  She is not felt her heart racing or skipping.  She feels like she is tolerating the amlodipine okay, but is concerned that her blood pressure may still be running a little high.  She is 124/84 here in the office today.  She reports when she was at the urologist office she was 140/80s, and she was 150/80s at her primary care provider's office.  When he rechecked it she was 130/80s.  She does not check her blood pressure at home and does not have a way to.  She is exercising at least 15 minutes 3 times a day walking on the treadmill or walking outside.  She is trying to keep her sodium less than 2 g a day and has been eating salmon and turkey lei.  She is scheduled to have her hysterectomy 2021.      Past Medical History:   Diagnosis Date   • History of anemia    • Hyperlipidemia    • Hypertension    • PONV (postoperative nausea and vomiting)    • Post-menopause bleeding    • Uterine fibroid          Past Surgical History:   Procedure Laterality Date   •  SECTION      TWINS   • CYSTOSCOPY W/ URETERAL STENT PLACEMENT Right 3/18/2021    Procedure: CYSTOSCOPY, RIGHT  URETERAL STENT INSERTION, AND RUSH CATHETER PLACEMENT;  Surgeon: Remy Bowens MD;  Location: [facility];  Service: Urology;  Laterality: Right;   • D & C WITH SUCTION     • D & C WITH SUCTION     • EXTRACORPOREAL SHOCKWAVE LITHOTRIPSY (ESWL), STENT INSERTION/REMOVAL Right 2021    Procedure: RIGHT EXTRACORPOREAL SHOCKWAVE LITHOTRIPSY;  Surgeon: Nito Felix MD;  Location: [facility];  Service: Urology;  Laterality:  Right;   • LAPAROSCOPIC TUBAL LIGATION         Current Outpatient Medications on File Prior to Visit   Medication Sig Dispense Refill   • amLODIPine (NORVASC) 2.5 MG tablet Take 1 tablet by mouth Daily. 90 tablet 3   • ferrous sulfate 325 (65 FE) MG tablet Take 1 tablet by mouth Daily With Breakfast. 90 tablet 1   • Multiple Vitamin (MULTI-VITAMIN DAILY PO) Take  by mouth Daily.     • rosuvastatin (Crestor) 10 MG tablet Take 1 tablet by mouth Daily. 90 tablet 3   • [DISCONTINUED] oxyCODONE-acetaminophen (PERCOCET) 5-325 MG per tablet        No current facility-administered medications on file prior to visit.       Social History     Socioeconomic History   • Marital status: Single     Spouse name: Not on file   • Number of children: Not on file   • Years of education: Not on file   • Highest education level: Not on file   Tobacco Use   • Smoking status: Never Smoker   • Smokeless tobacco: Never Used   Vaping Use   • Vaping Use: Never used   Substance and Sexual Activity   • Alcohol use: Yes     Alcohol/week: 1.0 standard drinks     Types: 1 Glasses of wine per week     Comment: Occ//Caffeine use: 2-3 cups daily   • Drug use: Defer   • Sexual activity: Defer     Partners: Male     Birth control/protection: Tubal ligation           Review of Systems   Constitutional: Negative for chills, decreased appetite, diaphoresis, fever and malaise/fatigue.   HENT: Negative for nosebleeds.    Eyes: Negative for blurred vision.   Cardiovascular: Negative for chest pain, claudication, cyanosis, dyspnea on exertion, irregular heartbeat, leg swelling, near-syncope, orthopnea, palpitations, paroxysmal nocturnal dyspnea and syncope.   Respiratory: Negative for cough, hemoptysis, shortness of breath, sleep disturbances due to breathing, snoring and wheezing.    Hematologic/Lymphatic: Negative for bleeding problem. Does not bruise/bleed easily.   Musculoskeletal: Negative for falls.   Gastrointestinal: Negative for heartburn.  "  Neurological: Negative for excessive daytime sleepiness, dizziness, headaches, light-headedness, numbness and weakness.       Procedures  Procedures        Objective:      Vitals:    07/21/21 0909   BP: 124/84   BP Location: Left arm   Patient Position: Lying   Cuff Size: Adult   Pulse: 94   Resp: 16   SpO2: 99%   Weight: 72.6 kg (160 lb)   Height: 175.3 cm (69\")     Body mass index is 23.63 kg/m².    Vitals reviewed.   Constitutional:       Appearance: Normal and healthy appearance. Well-developed, well-groomed and not in distress.   Eyes:      Conjunctiva/sclera: Conjunctivae normal.   Neck:      Vascular: No carotid bruit. JVD normal.   Pulmonary:      Effort: Pulmonary effort is normal.      Breath sounds: Normal breath sounds.   Cardiovascular:      PMI at left midclavicular line. Normal rate. Regular rhythm.      Murmurs: There is no murmur.   Pulses:     Intact distal pulses.   Edema:     Peripheral edema absent.   Abdominal:      Palpations: Abdomen is soft.      Tenderness: There is no abdominal tenderness.   Skin:     General: Skin is warm and dry.   Neurological:      Mental Status: Alert and oriented to person, place and time.   Psychiatric:         Attention and Perception: Attention and perception normal.         Behavior: Behavior is cooperative.         Lab Review:       Assessment:      Diagnosis Plan   1. Essential hypertension     2. Hypercholesterolemia     3. Intramural leiomyoma of uterus     4. Postmenopausal bleeding     5. Right renal stone       1. Hypertension, goal less than 120/80, blood pressure 124/84 in the office today.  Appears to be controlled but she has had some high readings at other doctor's offices.  She does not check her blood pressure at home.  2. Abnormal EKG showing left anterior fascicular block and incomplete right bundle branch block.  She has no symptoms of decompensated heart failure or unstable angina.  Echocardiogram showed normal ejection fraction, grade 1 left " ventricular diastolic function, left ventricular wall thickness consistent with septal asymmetric hypertrophy, and mild anterior mitral leaflet thickening.  3. Hyperlipidemia untreated as her ratio is good.  4. Iron deficiency anemia.  On iron replacement.  5. Preoperative evaluation for hysterectomy indicated Leiomyomas and dysmenorrhea.  Hysterectomy scheduled 11/12/2021.  Cleared for surgery from a cardiac standpoint.      Plan:       She is going to get a blood pressure cuff to monitor her blood pressure at home.  She is going to call and report to us what her readings are.  We discussed taking her blood pressure when she has been resting for 10 to 15 minutes, sitting with her feet flat on the floor, and resting her arm on the table.  I will have her follow-up with Dr. Yee in 1 year, unless she is having consistently high blood pressures.  Then I will have her increase her amlodipine to 5 mg a day and have her follow-up with me in 4 weeks after that.

## 2021-07-29 ENCOUNTER — OFFICE VISIT (OUTPATIENT)
Dept: FAMILY MEDICINE CLINIC | Facility: CLINIC | Age: 61
End: 2021-07-29

## 2021-07-29 VITALS
BODY MASS INDEX: 23.67 KG/M2 | WEIGHT: 159.8 LBS | HEART RATE: 95 BPM | RESPIRATION RATE: 18 BRPM | TEMPERATURE: 98 F | HEIGHT: 69 IN | SYSTOLIC BLOOD PRESSURE: 130 MMHG | OXYGEN SATURATION: 96 % | DIASTOLIC BLOOD PRESSURE: 80 MMHG

## 2021-07-29 DIAGNOSIS — I10 ESSENTIAL HYPERTENSION: ICD-10-CM

## 2021-07-29 DIAGNOSIS — D50.8 IRON DEFICIENCY ANEMIA SECONDARY TO INADEQUATE DIETARY IRON INTAKE: ICD-10-CM

## 2021-07-29 DIAGNOSIS — Z12.11 COLON CANCER SCREENING: Primary | ICD-10-CM

## 2021-07-29 DIAGNOSIS — D25.9 UTERINE LEIOMYOMA, UNSPECIFIED LOCATION: ICD-10-CM

## 2021-07-29 PROBLEM — F33.1 MODERATE EPISODE OF RECURRENT MAJOR DEPRESSIVE DISORDER (HCC): Status: ACTIVE | Noted: 2021-07-29

## 2021-07-29 PROBLEM — F33.1 MODERATE EPISODE OF RECURRENT MAJOR DEPRESSIVE DISORDER: Status: RESOLVED | Noted: 2021-07-29 | Resolved: 2021-07-29

## 2021-07-29 PROCEDURE — 99214 OFFICE O/P EST MOD 30 MIN: CPT | Performed by: INTERNAL MEDICINE

## 2021-07-29 RX ORDER — AMLODIPINE BESYLATE 5 MG/1
5 TABLET ORAL DAILY
Qty: 30 TABLET | Refills: 5 | Status: SHIPPED | OUTPATIENT
Start: 2021-07-29 | End: 2022-01-24 | Stop reason: SDUPTHER

## 2021-07-29 NOTE — PROGRESS NOTES
Subjective   Mehnaz Denney is a 60 y.o. female. Patient is here today for   Chief Complaint   Patient presents with   • Hyperlipidemia     lab fu          Vitals:    07/29/21 1553   BP: 130/80   Pulse: 95   Resp: 18   Temp: 98 °F (36.7 °C)   SpO2: 96%     Body mass index is 23.59 kg/m².      Past Medical History:   Diagnosis Date   • History of anemia    • Hyperlipidemia    • Hypertension    • PONV (postoperative nausea and vomiting)    • Post-menopause bleeding    • Uterine fibroid       No Known Allergies   Social History     Socioeconomic History   • Marital status: Single     Spouse name: Not on file   • Number of children: Not on file   • Years of education: Not on file   • Highest education level: Not on file   Tobacco Use   • Smoking status: Never Smoker   • Smokeless tobacco: Never Used   Vaping Use   • Vaping Use: Never used   Substance and Sexual Activity   • Alcohol use: Yes     Alcohol/week: 1.0 standard drinks     Types: 1 Glasses of wine per week     Comment: Occ//Caffeine use: 2-3 cups daily   • Drug use: Defer   • Sexual activity: Defer     Partners: Male     Birth control/protection: Tubal ligation        Current Outpatient Medications:   •  ferrous sulfate 325 (65 FE) MG tablet, Take 1 tablet by mouth Daily With Breakfast., Disp: 90 tablet, Rfl: 1  •  Multiple Vitamin (MULTI-VITAMIN DAILY PO), Take  by mouth Daily., Disp: , Rfl:   •  rosuvastatin (Crestor) 10 MG tablet, Take 1 tablet by mouth Daily., Disp: 90 tablet, Rfl: 3  •  amLODIPine (NORVASC) 5 MG tablet, Take 1 tablet by mouth Daily., Disp: 30 tablet, Rfl: 5     Objective     She is here to follow-up on her iron deficiency anemia.       Review of Systems   Constitutional: Negative.    HENT: Negative.  Negative for congestion.    Respiratory: Negative.    Cardiovascular: Negative.        Physical Exam  Vitals and nursing note reviewed.   Constitutional:       Appearance: Normal appearance.      Comments: Pleasant, neatly groomed, no  distress.   Cardiovascular:      Rate and Rhythm: Regular rhythm.      Heart sounds: Normal heart sounds. No murmur heard.   No gallop.    Neurological:      Mental Status: She is alert and oriented to person, place, and time.   Psychiatric:         Mood and Affect: Mood normal.         Behavior: Behavior normal.         Thought Content: Thought content normal.           Problems Addressed this Visit        Cardiac and Vasculature    Essential hypertension    Relevant Medications    amLODIPine (NORVASC) 5 MG tablet       Genitourinary and Reproductive     Uterine fibroid       Hematology and Neoplasia    Iron deficiency anemia secondary to inadequate dietary iron intake      Other Visit Diagnoses     Colon cancer screening    -  Primary    Relevant Orders    Cologuard - Stool, Per Rectum      Diagnoses       Codes Comments    Colon cancer screening    -  Primary ICD-10-CM: Z12.11  ICD-9-CM: V76.51     Uterine leiomyoma, unspecified location     ICD-10-CM: D25.9  ICD-9-CM: 218.9     Essential hypertension     ICD-10-CM: I10  ICD-9-CM: 401.9     Iron deficiency anemia secondary to inadequate dietary iron intake     ICD-10-CM: D50.8  ICD-9-CM: 280.1       Her anemia is much improved.  I like her to continue to take her supplemental iron once daily.  Her hypertension is well controlled.    I am going to set her up to get a Cologuard.    She will follow up with gynecology regarding surgery for her uterine fibroid.    She should follow-up with me for a comprehensive physical examination at some point.    I would like to see her back in 6 months or so.    Her blood pressure needs better control.  She is taking 2-1/2 mg of amlodipine daily.  I asked her to increase that to 5 mg daily.  No follow-ups on file.

## 2021-10-25 ENCOUNTER — OFFICE VISIT (OUTPATIENT)
Dept: OBSTETRICS AND GYNECOLOGY | Age: 61
End: 2021-10-25

## 2021-10-25 VITALS
WEIGHT: 155 LBS | DIASTOLIC BLOOD PRESSURE: 78 MMHG | SYSTOLIC BLOOD PRESSURE: 132 MMHG | HEIGHT: 69 IN | BODY MASS INDEX: 22.96 KG/M2

## 2021-10-25 DIAGNOSIS — N95.0 PMB (POSTMENOPAUSAL BLEEDING): ICD-10-CM

## 2021-10-25 DIAGNOSIS — D21.9 FIBROIDS: Primary | ICD-10-CM

## 2021-10-25 PROCEDURE — 99213 OFFICE O/P EST LOW 20 MIN: CPT | Performed by: OBSTETRICS & GYNECOLOGY

## 2021-10-25 NOTE — PROGRESS NOTES
GYN Visit    10/25/2021    Patient: Mehnaz Denney          MR#:[id number]      Chief Complaint   Patient presents with   • Follow-up     U/S today, Fibroids and PMB has resolved.       History of Present Illness    61 y.o. female  who presents for  Recheck uterine fibroids and presurgery consult  No more bleeding  Still feeling bulk symptoms  Recent issues with kidney stones as well  Reviewed NAMRATA/BSO - RBA  Confirmed on exam that abdominal approach best          No LMP recorded (lmp unknown). Patient is postmenopausal.    ________________________________________  Patient Active Problem List   Diagnosis   • Iron deficiency anemia due to chronic blood loss   • Essential hypertension   • Uterine fibroid   • Hypercholesterolemia   • Postmenopausal bleeding   • Intramural leiomyoma of uterus   • PMB (postmenopausal bleeding)   • Right renal stone   • Iron deficiency anemia secondary to inadequate dietary iron intake       Past Medical History:   Diagnosis Date   • History of anemia    • Hyperlipidemia    • Hypertension    • PONV (postoperative nausea and vomiting)    • Post-menopause bleeding    • Uterine fibroid        Past Surgical History:   Procedure Laterality Date   •  SECTION      TWINS   • CYSTOSCOPY W/ URETERAL STENT PLACEMENT Right 3/18/2021    Procedure: CYSTOSCOPY, RIGHT  URETERAL STENT INSERTION, AND RUSH CATHETER PLACEMENT;  Surgeon: Remy Bowens MD;  Location: [facility];  Service: Urology;  Laterality: Right;   • D & C WITH SUCTION     • D & C WITH SUCTION     • EXTRACORPOREAL SHOCKWAVE LITHOTRIPSY (ESWL), STENT INSERTION/REMOVAL Right 2021    Procedure: RIGHT EXTRACORPOREAL SHOCKWAVE LITHOTRIPSY;  Surgeon: Nito Felix MD;  Location: [facility];  Service: Urology;  Laterality: Right;   • LAPAROSCOPIC TUBAL LIGATION         Social History     Tobacco Use   Smoking Status Never Smoker   Smokeless Tobacco Never Used       has a current medication list  "which includes the following prescription(s): amlodipine, ferrous sulfate, multiple vitamin, and rosuvastatin.  ________________________________________    Current contraception: post menopausal status      The following portions of the patient's history were reviewed and updated as appropriate: allergies, current medications, past family history, past medical history, past social history, past surgical history and problem list.    Review of Systems    Pertinent items are noted in HPI.     Objective   Physical Exam    /78   Ht 175.3 cm (69\")   Wt 70.3 kg (155 lb)   LMP  (LMP Unknown)   BMI 22.89 kg/m²    BP Readings from Last 3 Encounters:   10/25/21 132/78   07/29/21 130/80   07/21/21 124/84      Wt Readings from Last 3 Encounters:   10/25/21 70.3 kg (155 lb)   07/29/21 72.5 kg (159 lb 12.8 oz)   07/21/21 72.6 kg (160 lb)      BMI: Estimated body mass index is 22.89 kg/m² as calculated from the following:    Height as of this encounter: 175.3 cm (69\").    Weight as of this encounter: 70.3 kg (155 lb).    Lungs: non labored breathing, no wheezing or tachpnea  Extremities: extremities normal, atraumatic, no cyanosis or edema  Skin: Skin color, texture, turgor normal. No rashes or lesions  Neurologic: Grossly normal  General:   alert, appears stated age and cooperative   Abdomen: soft, non-tender, without masses or organomegaly       Vulva: normal   Vagina: normal mucosa   Cervix: no cervical motion tenderness   Uterus: bulky, fixed/poorly mobile  and size consistent with 16-18 weeks   Adnexa: normal adnexa       See US report- numerous fibroids, largest 5.9 cm, ovaries are normal , lining is improved at 9 mm  Compared to 11/20 US    Assessment:      Diagnoses and all orders for this visit:    1. Fibroids (Primary)    2. PMB (postmenopausal bleeding)      ANMRATA/BSO planned  RBA discussed,   US stable and no new concerning pathology  Pt has vertical incision, will use this        "

## 2021-11-09 DIAGNOSIS — D25.1 INTRAMURAL LEIOMYOMA OF UTERUS: Primary | ICD-10-CM

## 2021-11-10 ENCOUNTER — PRE-ADMISSION TESTING (OUTPATIENT)
Dept: PREADMISSION TESTING | Facility: HOSPITAL | Age: 61
End: 2021-11-10

## 2021-11-10 VITALS
TEMPERATURE: 98.2 F | RESPIRATION RATE: 20 BRPM | SYSTOLIC BLOOD PRESSURE: 124 MMHG | HEART RATE: 100 BPM | BODY MASS INDEX: 21.48 KG/M2 | OXYGEN SATURATION: 98 % | DIASTOLIC BLOOD PRESSURE: 86 MMHG | WEIGHT: 153.4 LBS | HEIGHT: 71 IN

## 2021-11-10 LAB
ANION GAP SERPL CALCULATED.3IONS-SCNC: 10.6 MMOL/L (ref 5–15)
BUN SERPL-MCNC: 13 MG/DL (ref 8–23)
BUN/CREAT SERPL: 16.7 (ref 7–25)
CALCIUM SPEC-SCNC: 11.6 MG/DL (ref 8.6–10.5)
CHLORIDE SERPL-SCNC: 103 MMOL/L (ref 98–107)
CO2 SERPL-SCNC: 26.4 MMOL/L (ref 22–29)
CREAT SERPL-MCNC: 0.78 MG/DL (ref 0.57–1)
DEPRECATED RDW RBC AUTO: 41.9 FL (ref 37–54)
ERYTHROCYTE [DISTWIDTH] IN BLOOD BY AUTOMATED COUNT: 15.5 % (ref 12.3–15.4)
GFR SERPL CREATININE-BSD FRML MDRD: 91 ML/MIN/1.73
GLUCOSE SERPL-MCNC: 108 MG/DL (ref 65–99)
HCT VFR BLD AUTO: 42.7 % (ref 34–46.6)
HGB BLD-MCNC: 13.3 G/DL (ref 12–15.9)
MCH RBC QN AUTO: 23.5 PG (ref 26.6–33)
MCHC RBC AUTO-ENTMCNC: 31.1 G/DL (ref 31.5–35.7)
MCV RBC AUTO: 75.4 FL (ref 79–97)
PLATELET # BLD AUTO: 200 10*3/MM3 (ref 140–450)
PMV BLD AUTO: 10.4 FL (ref 6–12)
POTASSIUM SERPL-SCNC: 3.4 MMOL/L (ref 3.5–5.2)
RBC # BLD AUTO: 5.66 10*6/MM3 (ref 3.77–5.28)
SODIUM SERPL-SCNC: 140 MMOL/L (ref 136–145)
WBC # BLD AUTO: 4.91 10*3/MM3 (ref 3.4–10.8)

## 2021-11-10 PROCEDURE — 85027 COMPLETE CBC AUTOMATED: CPT

## 2021-11-10 PROCEDURE — 80048 BASIC METABOLIC PNL TOTAL CA: CPT

## 2021-11-10 PROCEDURE — 36415 COLL VENOUS BLD VENIPUNCTURE: CPT

## 2021-11-10 PROCEDURE — U0004 COV-19 TEST NON-CDC HGH THRU: HCPCS | Performed by: OBSTETRICS & GYNECOLOGY

## 2021-11-10 RX ORDER — CHLORHEXIDINE GLUCONATE 500 MG/1
CLOTH TOPICAL
Status: ON HOLD | COMMUNITY
End: 2021-11-12

## 2021-11-10 NOTE — DISCHARGE INSTRUCTIONS
Take the following medications the morning of surgery:    NORVASC    If you are on prescription narcotic pain medication to control your pain you may also take that medication the morning of surgery.    General Instructions:  • Do not eat solid food after midnight the night before surgery.  • You may drink clear liquids day of surgery but must stop at least one hour before your hospital arrival time.  • It is beneficial for you to have a clear drink that contains carbohydrates the day of surgery.  We suggest a 12 to 20 ounce bottle of Gatorade or Powerade for non-diabetic patients     Clear liquids are liquids you can see through.  Nothing red in color.     Plain water                               Sports drinks  Sodas                                   Gelatin (Jell-O)  Fruit juices without pulp such as white grape juice and apple juice  Popsicles that contain no fruit or yogurt  Tea or coffee (no cream or milk added)  Gatorade / Powerade  G2 / Powerade Zero  •   • Bring any papers given to you in the doctor’s office.  • Wear clean comfortable clothes.  • Do not wear contact lenses, false eyelashes or make-up.  Bring a case for your glasses.  • Remove all piercings.  Leave jewelry and any other valuables at home.  • Hair extensions with metal clips must be removed prior to surgery.  • The Pre-Admission Testing nurse will instruct you to bring medications if unable to obtain an accurate list in Pre-Admission Testing.    • REPORT TO SURGERY ENTRANCE ON 11- AT 0530 AM      Preventing a Surgical Site Infection:  • For 2 to 3 days before surgery, avoid shaving with a razor because the razor can irritate skin and make it easier to develop an infection.    • Any areas of open skin can increase the risk of a post-operative wound infection by allowing bacteria to enter and travel throughout the body.  Notify your surgeon if you have any skin wounds / rashes even if it is not near the expected surgical site.  The area  will need assessed to determine if surgery should be delayed until it is healed.  • The night prior to surgery shower using a fresh bar of anti-bacterial soap (such as Dial) and clean washcloth.  Sleep in a clean bed with clean clothing.  Do not allow pets to sleep with you.  • Shower on the morning of surgery using a fresh bar of anti-bacterial soap (such as Dial) and clean washcloth.  Dry with a clean towel and dress in clean clothing.  • Ask your surgeon if you will be receiving antibiotics prior to surgery.  • Make sure you, your family, and all healthcare providers clean their hands with soap and water or an alcohol based hand  before caring for you or your wound.    Day of surgery:  Your arrival time is approximately two hours before your scheduled surgery time.  Upon arrival, a Pre-op nurse and Anesthesiologist will review your health history, obtain vital signs, and answer questions you may have.  The only belongings needed at this time will be a list of your home medications and if applicable your C-PAP/BI-PAP machine.  A Pre-op nurse will start an IV and you may receive medication in preparation for surgery, including something to help you relax.     Please be aware that surgery does come with discomfort.  We want to make every effort to control your discomfort so please discuss any uncontrolled symptoms with your nurse.   Your doctor will most likely have prescribed pain medications.      CHLORHEXIDINE CLOTH INSTRUCTIONS  The morning of surgery follow these instructions using the Chlorhexidine cloths you've been given.  These steps reduce bacteria on the body.  Do not use the cloths near your eyes, ears mouth, genitalia or on open wounds.  Throw the cloths away after use but do not try to flush them down a toilet.      • Open and remove one cloth at a time from the package.    • Leave the cloth unfolded and begin the bathing.  • Massage the skin with the cloths using gentle pressure to remove  bacteria.  Do not scrub harshly.   • Follow the steps below with one 2% CHG cloth per area (6 total cloths).  • One cloth for neck, shoulders and chest.  • One cloth for both arms, hands, fingers and underarms (do underarms last).  • One cloth for the abdomen followed by groin.  • One cloth for right leg and foot including between the toes.  • One cloth for left leg and foot including between the toes.  • The last cloth is to be used for the back of the neck, back and buttocks.    Allow the CHG to air dry 3 minutes on the skin which will give it time to work and decrease the chance of irritation.  The skin may feel sticky until it is dry.  Do not rinse with water or any other liquid or you will lose the beneficial effects of the CHG.  If mild skin irritation occurs, do rinse the skin to remove the CHG.  Report this to the nurse at time of admission.  Do not apply lotions, creams, ointments, deodorants or perfumes after using the clothes. Dress in clean clothes before coming to the hospital.    If you are staying overnight following surgery, you will be transported to your hospital room following the recovery period.  [facility] has all private rooms.    If you have any questions please call Pre-Admission Testing at [phone].  Deductibles and co-payments are collected on the day of service. Please be prepared to pay the required co-pay, deductible or deposit on the day of service as defined by your plan.    Patient Education for Self-Quarantine Process    • Following your COVID testing, we strongly recommend that you wear a mask when you are with other people and practice social distancing.   • Limit your activities to only required outings.  • Wash your hands with soap and water frequently for at least 20 seconds.   • Avoid touching your eyes, nose and mouth with unwashed hands.  • Do not share anything - utensils, drinking glasses, food from the same bowl.   • Sanitize household surfaces daily.  Include all high touch areas (door handles, light switches, phones, countertops, etc.)    Call your surgeon immediately if you experience any of the following symptoms:  • Sore Throat  • Shortness of Breath or difficulty breathing  • Cough  • Chills  • Body soreness or muscle pain  • Headache  • Fever  • New loss of taste or smell  • Do not arrive for your surgery ill.  Your procedure will need to be rescheduled to another time.  You will need to call your physician before the day of surgery to avoid any unnecessary exposure to hospital staff as well as other patients.

## 2021-11-12 ENCOUNTER — HOSPITAL ENCOUNTER (INPATIENT)
Facility: HOSPITAL | Age: 61
LOS: 2 days | Discharge: HOME | End: 2021-11-14
Attending: OBSTETRICS & GYNECOLOGY | Admitting: OBSTETRICS & GYNECOLOGY

## 2021-11-12 ENCOUNTER — ANESTHESIA (OUTPATIENT)
Dept: PERIOP | Facility: HOSPITAL | Age: 61
End: 2021-11-12

## 2021-11-12 ENCOUNTER — ANESTHESIA EVENT (OUTPATIENT)
Dept: PERIOP | Facility: HOSPITAL | Age: 61
End: 2021-11-12

## 2021-11-12 DIAGNOSIS — D25.1 INTRAMURAL LEIOMYOMA OF UTERUS: ICD-10-CM

## 2021-11-12 DIAGNOSIS — N95.0 PMB (POSTMENOPAUSAL BLEEDING): ICD-10-CM

## 2021-11-12 DIAGNOSIS — Z90.710 HX OF TOTAL HYSTERECTOMY: Primary | ICD-10-CM

## 2021-11-12 PROCEDURE — 0UT90ZZ RESECTION OF UTERUS, OPEN APPROACH: ICD-10-PCS | Performed by: OBSTETRICS & GYNECOLOGY

## 2021-11-12 PROCEDURE — 25010000002 FENTANYL CITRATE (PF) 50 MCG/ML SOLUTION: Performed by: NURSE ANESTHETIST, CERTIFIED REGISTERED

## 2021-11-12 PROCEDURE — 25010000002 MIDAZOLAM PER 1 MG: Performed by: ANESTHESIOLOGY

## 2021-11-12 PROCEDURE — 0 HYDROMORPHONE HCL PF 50 MG/5ML SOLUTION: Performed by: OBSTETRICS & GYNECOLOGY

## 2021-11-12 PROCEDURE — 25010000002 DROPERIDOL PER 5 MG: Performed by: ANESTHESIOLOGY

## 2021-11-12 PROCEDURE — 0 CEFAZOLIN IN DEXTROSE 2-4 GM/100ML-% SOLUTION: Performed by: OBSTETRICS & GYNECOLOGY

## 2021-11-12 PROCEDURE — 58150 TOTAL HYSTERECTOMY: CPT | Performed by: OBSTETRICS & GYNECOLOGY

## 2021-11-12 PROCEDURE — 25010000002 HYDROMORPHONE PER 4 MG: Performed by: ANESTHESIOLOGY

## 2021-11-12 PROCEDURE — 25010000002 KETOROLAC TROMETHAMINE PER 15 MG: Performed by: NURSE ANESTHETIST, CERTIFIED REGISTERED

## 2021-11-12 PROCEDURE — 25010000002 PROPOFOL 10 MG/ML EMULSION: Performed by: NURSE ANESTHETIST, CERTIFIED REGISTERED

## 2021-11-12 PROCEDURE — 0UT70ZZ RESECTION OF BILATERAL FALLOPIAN TUBES, OPEN APPROACH: ICD-10-PCS | Performed by: OBSTETRICS & GYNECOLOGY

## 2021-11-12 PROCEDURE — 0UT20ZZ RESECTION OF BILATERAL OVARIES, OPEN APPROACH: ICD-10-PCS | Performed by: OBSTETRICS & GYNECOLOGY

## 2021-11-12 PROCEDURE — 63710000001 ONDANSETRON PER 8 MG: Performed by: OBSTETRICS & GYNECOLOGY

## 2021-11-12 PROCEDURE — 25010000002 PHENYLEPHRINE 10 MG/ML SOLUTION: Performed by: NURSE ANESTHETIST, CERTIFIED REGISTERED

## 2021-11-12 PROCEDURE — 25010000002 ONDANSETRON PER 1 MG: Performed by: NURSE ANESTHETIST, CERTIFIED REGISTERED

## 2021-11-12 PROCEDURE — S0260 H&P FOR SURGERY: HCPCS | Performed by: OBSTETRICS & GYNECOLOGY

## 2021-11-12 PROCEDURE — 25010000002 NEOSTIGMINE 5 MG/10ML SOLUTION: Performed by: NURSE ANESTHETIST, CERTIFIED REGISTERED

## 2021-11-12 PROCEDURE — 25010000002 ONDANSETRON PER 1 MG: Performed by: OBSTETRICS & GYNECOLOGY

## 2021-11-12 PROCEDURE — 88307 TISSUE EXAM BY PATHOLOGIST: CPT | Performed by: OBSTETRICS & GYNECOLOGY

## 2021-11-12 PROCEDURE — 25010000002 DEXAMETHASONE PER 1 MG: Performed by: NURSE ANESTHETIST, CERTIFIED REGISTERED

## 2021-11-12 RX ORDER — IBUPROFEN 600 MG/1
600 TABLET ORAL EVERY 6 HOURS SCHEDULED
Status: DISCONTINUED | OUTPATIENT
Start: 2021-11-12 | End: 2021-11-14 | Stop reason: HOSPADM

## 2021-11-12 RX ORDER — KETAMINE HYDROCHLORIDE 10 MG/ML
INJECTION INTRAMUSCULAR; INTRAVENOUS AS NEEDED
Status: DISCONTINUED | OUTPATIENT
Start: 2021-11-12 | End: 2021-11-12 | Stop reason: SURG

## 2021-11-12 RX ORDER — MIDAZOLAM HYDROCHLORIDE 1 MG/ML
2 INJECTION INTRAMUSCULAR; INTRAVENOUS ONCE
Status: DISCONTINUED | OUTPATIENT
Start: 2021-11-12 | End: 2021-11-12 | Stop reason: HOSPADM

## 2021-11-12 RX ORDER — ONDANSETRON 2 MG/ML
INJECTION INTRAMUSCULAR; INTRAVENOUS AS NEEDED
Status: DISCONTINUED | OUTPATIENT
Start: 2021-11-12 | End: 2021-11-12 | Stop reason: SURG

## 2021-11-12 RX ORDER — ONDANSETRON 2 MG/ML
4 INJECTION INTRAMUSCULAR; INTRAVENOUS ONCE AS NEEDED
Status: DISCONTINUED | OUTPATIENT
Start: 2021-11-12 | End: 2021-11-12 | Stop reason: HOSPADM

## 2021-11-12 RX ORDER — SCOLOPAMINE TRANSDERMAL SYSTEM 1 MG/1
1 PATCH, EXTENDED RELEASE TRANSDERMAL ONCE
Status: COMPLETED | OUTPATIENT
Start: 2021-11-12 | End: 2021-11-13

## 2021-11-12 RX ORDER — SODIUM CHLORIDE 0.9 % (FLUSH) 0.9 %
10 SYRINGE (ML) INJECTION AS NEEDED
Status: DISCONTINUED | OUTPATIENT
Start: 2021-11-12 | End: 2021-11-12 | Stop reason: HOSPADM

## 2021-11-12 RX ORDER — FAMOTIDINE 20 MG/1
20 TABLET, FILM COATED ORAL
Status: DISCONTINUED | OUTPATIENT
Start: 2021-11-12 | End: 2021-11-14 | Stop reason: HOSPADM

## 2021-11-12 RX ORDER — GLYCOPYRROLATE 0.2 MG/ML
INJECTION INTRAMUSCULAR; INTRAVENOUS AS NEEDED
Status: DISCONTINUED | OUTPATIENT
Start: 2021-11-12 | End: 2021-11-12 | Stop reason: SURG

## 2021-11-12 RX ORDER — EPHEDRINE SULFATE 50 MG/ML
INJECTION, SOLUTION INTRAVENOUS AS NEEDED
Status: DISCONTINUED | OUTPATIENT
Start: 2021-11-12 | End: 2021-11-12 | Stop reason: SURG

## 2021-11-12 RX ORDER — SODIUM CHLORIDE 0.9 % (FLUSH) 0.9 %
3 SYRINGE (ML) INJECTION EVERY 12 HOURS SCHEDULED
Status: DISCONTINUED | OUTPATIENT
Start: 2021-11-12 | End: 2021-11-12 | Stop reason: HOSPADM

## 2021-11-12 RX ORDER — HYDROCODONE BITARTRATE AND ACETAMINOPHEN 7.5; 325 MG/1; MG/1
1 TABLET ORAL EVERY 4 HOURS PRN
Status: DISCONTINUED | OUTPATIENT
Start: 2021-11-12 | End: 2021-11-12 | Stop reason: HOSPADM

## 2021-11-12 RX ORDER — MAGNESIUM HYDROXIDE 1200 MG/15ML
LIQUID ORAL AS NEEDED
Status: DISCONTINUED | OUTPATIENT
Start: 2021-11-12 | End: 2021-11-12 | Stop reason: HOSPADM

## 2021-11-12 RX ORDER — ONDANSETRON 4 MG/1
4 TABLET, FILM COATED ORAL EVERY 6 HOURS PRN
Status: DISCONTINUED | OUTPATIENT
Start: 2021-11-12 | End: 2021-11-14 | Stop reason: HOSPADM

## 2021-11-12 RX ORDER — OXYCODONE AND ACETAMINOPHEN 10; 325 MG/1; MG/1
1 TABLET ORAL EVERY 4 HOURS PRN
Status: DISCONTINUED | OUTPATIENT
Start: 2021-11-12 | End: 2021-11-14 | Stop reason: HOSPADM

## 2021-11-12 RX ORDER — PHENYLEPHRINE HYDROCHLORIDE 10 MG/ML
INJECTION INTRAVENOUS AS NEEDED
Status: DISCONTINUED | OUTPATIENT
Start: 2021-11-12 | End: 2021-11-12 | Stop reason: SURG

## 2021-11-12 RX ORDER — CEFAZOLIN SODIUM 2 G/100ML
2 INJECTION, SOLUTION INTRAVENOUS ONCE
Status: COMPLETED | OUTPATIENT
Start: 2021-11-12 | End: 2021-11-12

## 2021-11-12 RX ORDER — ONDANSETRON 2 MG/ML
4 INJECTION INTRAMUSCULAR; INTRAVENOUS EVERY 6 HOURS PRN
Status: DISCONTINUED | OUTPATIENT
Start: 2021-11-12 | End: 2021-11-14 | Stop reason: HOSPADM

## 2021-11-12 RX ORDER — MIDAZOLAM HYDROCHLORIDE 1 MG/ML
1 INJECTION INTRAMUSCULAR; INTRAVENOUS
Status: DISCONTINUED | OUTPATIENT
Start: 2021-11-12 | End: 2021-11-12 | Stop reason: HOSPADM

## 2021-11-12 RX ORDER — SODIUM CHLORIDE, SODIUM LACTATE, POTASSIUM CHLORIDE, CALCIUM CHLORIDE 600; 310; 30; 20 MG/100ML; MG/100ML; MG/100ML; MG/100ML
100 INJECTION, SOLUTION INTRAVENOUS CONTINUOUS
Status: DISCONTINUED | OUTPATIENT
Start: 2021-11-12 | End: 2021-11-13

## 2021-11-12 RX ORDER — FENTANYL CITRATE 50 UG/ML
50 INJECTION, SOLUTION INTRAMUSCULAR; INTRAVENOUS
Status: DISCONTINUED | OUTPATIENT
Start: 2021-11-12 | End: 2021-11-12 | Stop reason: HOSPADM

## 2021-11-12 RX ORDER — FENTANYL CITRATE 50 UG/ML
50 INJECTION, SOLUTION INTRAMUSCULAR; INTRAVENOUS ONCE
Status: DISCONTINUED | OUTPATIENT
Start: 2021-11-12 | End: 2021-11-12 | Stop reason: HOSPADM

## 2021-11-12 RX ORDER — FENTANYL CITRATE 50 UG/ML
100 INJECTION, SOLUTION INTRAMUSCULAR; INTRAVENOUS
Status: DISCONTINUED | OUTPATIENT
Start: 2021-11-12 | End: 2021-11-12 | Stop reason: HOSPADM

## 2021-11-12 RX ORDER — NALOXONE HCL 0.4 MG/ML
0.1 VIAL (ML) INJECTION
Status: DISCONTINUED | OUTPATIENT
Start: 2021-11-12 | End: 2021-11-13

## 2021-11-12 RX ORDER — HYDROMORPHONE HYDROCHLORIDE 1 MG/ML
0.5 INJECTION, SOLUTION INTRAMUSCULAR; INTRAVENOUS; SUBCUTANEOUS
Status: DISCONTINUED | OUTPATIENT
Start: 2021-11-12 | End: 2021-11-12 | Stop reason: HOSPADM

## 2021-11-12 RX ORDER — CALCIUM CARBONATE 200(500)MG
2 TABLET,CHEWABLE ORAL 3 TIMES DAILY PRN
Status: DISCONTINUED | OUTPATIENT
Start: 2021-11-12 | End: 2021-11-14 | Stop reason: HOSPADM

## 2021-11-12 RX ORDER — SODIUM CHLORIDE, SODIUM LACTATE, POTASSIUM CHLORIDE, CALCIUM CHLORIDE 600; 310; 30; 20 MG/100ML; MG/100ML; MG/100ML; MG/100ML
9 INJECTION, SOLUTION INTRAVENOUS CONTINUOUS
Status: DISCONTINUED | OUTPATIENT
Start: 2021-11-12 | End: 2021-11-14 | Stop reason: HOSPADM

## 2021-11-12 RX ORDER — SIMETHICONE 80 MG
80 TABLET,CHEWABLE ORAL 4 TIMES DAILY PRN
Status: DISCONTINUED | OUTPATIENT
Start: 2021-11-12 | End: 2021-11-14 | Stop reason: HOSPADM

## 2021-11-12 RX ORDER — FENTANYL CITRATE 50 UG/ML
INJECTION, SOLUTION INTRAMUSCULAR; INTRAVENOUS AS NEEDED
Status: DISCONTINUED | OUTPATIENT
Start: 2021-11-12 | End: 2021-11-12 | Stop reason: SURG

## 2021-11-12 RX ORDER — HYDROMORPHONE HCL IN 0.9% NACL 10 MG/50ML
PATIENT CONTROLLED ANALGESIA SYRINGE INTRAVENOUS CONTINUOUS
Status: DISCONTINUED | OUTPATIENT
Start: 2021-11-12 | End: 2021-11-13

## 2021-11-12 RX ORDER — PROPOFOL 10 MG/ML
VIAL (ML) INTRAVENOUS AS NEEDED
Status: DISCONTINUED | OUTPATIENT
Start: 2021-11-12 | End: 2021-11-12 | Stop reason: SURG

## 2021-11-12 RX ORDER — FLUMAZENIL 0.1 MG/ML
0.2 INJECTION INTRAVENOUS AS NEEDED
Status: DISCONTINUED | OUTPATIENT
Start: 2021-11-12 | End: 2021-11-12 | Stop reason: HOSPADM

## 2021-11-12 RX ORDER — DEXAMETHASONE SODIUM PHOSPHATE 10 MG/ML
INJECTION INTRAMUSCULAR; INTRAVENOUS AS NEEDED
Status: DISCONTINUED | OUTPATIENT
Start: 2021-11-12 | End: 2021-11-12 | Stop reason: SURG

## 2021-11-12 RX ORDER — DROPERIDOL 2.5 MG/ML
0.62 INJECTION, SOLUTION INTRAMUSCULAR; INTRAVENOUS ONCE
Status: COMPLETED | OUTPATIENT
Start: 2021-11-12 | End: 2021-11-12

## 2021-11-12 RX ORDER — KETOROLAC TROMETHAMINE 30 MG/ML
INJECTION, SOLUTION INTRAMUSCULAR; INTRAVENOUS AS NEEDED
Status: DISCONTINUED | OUTPATIENT
Start: 2021-11-12 | End: 2021-11-12 | Stop reason: SURG

## 2021-11-12 RX ORDER — EPHEDRINE SULFATE 50 MG/ML
5 INJECTION, SOLUTION INTRAVENOUS ONCE AS NEEDED
Status: DISCONTINUED | OUTPATIENT
Start: 2021-11-12 | End: 2021-11-12 | Stop reason: HOSPADM

## 2021-11-12 RX ORDER — LIDOCAINE HYDROCHLORIDE 10 MG/ML
0.5 INJECTION, SOLUTION EPIDURAL; INFILTRATION; INTRACAUDAL; PERINEURAL ONCE AS NEEDED
Status: DISCONTINUED | OUTPATIENT
Start: 2021-11-12 | End: 2021-11-12 | Stop reason: HOSPADM

## 2021-11-12 RX ORDER — NEOSTIGMINE METHYLSULFATE 0.5 MG/ML
INJECTION, SOLUTION INTRAVENOUS AS NEEDED
Status: DISCONTINUED | OUTPATIENT
Start: 2021-11-12 | End: 2021-11-12 | Stop reason: SURG

## 2021-11-12 RX ORDER — DOCUSATE SODIUM 100 MG/1
100 CAPSULE, LIQUID FILLED ORAL 2 TIMES DAILY PRN
Status: DISCONTINUED | OUTPATIENT
Start: 2021-11-12 | End: 2021-11-14 | Stop reason: HOSPADM

## 2021-11-12 RX ORDER — SODIUM CHLORIDE 0.9 % (FLUSH) 0.9 %
3-10 SYRINGE (ML) INJECTION AS NEEDED
Status: DISCONTINUED | OUTPATIENT
Start: 2021-11-12 | End: 2021-11-12 | Stop reason: HOSPADM

## 2021-11-12 RX ORDER — ROCURONIUM BROMIDE 10 MG/ML
INJECTION, SOLUTION INTRAVENOUS AS NEEDED
Status: DISCONTINUED | OUTPATIENT
Start: 2021-11-12 | End: 2021-11-12 | Stop reason: SURG

## 2021-11-12 RX ORDER — LIDOCAINE HYDROCHLORIDE 20 MG/ML
INJECTION, SOLUTION INFILTRATION; PERINEURAL AS NEEDED
Status: DISCONTINUED | OUTPATIENT
Start: 2021-11-12 | End: 2021-11-12 | Stop reason: SURG

## 2021-11-12 RX ORDER — OXYCODONE HYDROCHLORIDE AND ACETAMINOPHEN 5; 325 MG/1; MG/1
1 TABLET ORAL EVERY 4 HOURS PRN
Status: DISCONTINUED | OUTPATIENT
Start: 2021-11-12 | End: 2021-11-14 | Stop reason: HOSPADM

## 2021-11-12 RX ORDER — HYDROCODONE BITARTRATE AND ACETAMINOPHEN 5; 325 MG/1; MG/1
1 TABLET ORAL ONCE AS NEEDED
Status: DISCONTINUED | OUTPATIENT
Start: 2021-11-12 | End: 2021-11-12 | Stop reason: HOSPADM

## 2021-11-12 RX ADMIN — KETAMINE HYDROCHLORIDE 40 MG: 10 INJECTION INTRAMUSCULAR; INTRAVENOUS at 07:55

## 2021-11-12 RX ADMIN — PHENYLEPHRINE HYDROCHLORIDE 100 MCG: 10 INJECTION, SOLUTION INTRAVENOUS at 08:23

## 2021-11-12 RX ADMIN — NEOSTIGMINE METHYLSULFATE 2 MG: 0.5 INJECTION INTRAVENOUS at 09:19

## 2021-11-12 RX ADMIN — FENTANYL CITRATE 50 MCG: 50 INJECTION INTRAMUSCULAR; INTRAVENOUS at 07:34

## 2021-11-12 RX ADMIN — EPHEDRINE SULFATE 10 MG: 50 INJECTION INTRAVENOUS at 08:41

## 2021-11-12 RX ADMIN — PROPOFOL 30 MCG/KG/MIN: 10 INJECTION, EMULSION INTRAVENOUS at 07:42

## 2021-11-12 RX ADMIN — DROPERIDOL 0.62 MG: 2.5 INJECTION, SOLUTION INTRAMUSCULAR; INTRAVENOUS at 12:20

## 2021-11-12 RX ADMIN — LIDOCAINE HYDROCHLORIDE 80 MG: 20 INJECTION, SOLUTION INFILTRATION; PERINEURAL at 07:34

## 2021-11-12 RX ADMIN — IBUPROFEN 600 MG: 600 TABLET, FILM COATED ORAL at 18:12

## 2021-11-12 RX ADMIN — HYDROMORPHONE HYDROCHLORIDE 0.5 MG: 1 INJECTION, SOLUTION INTRAMUSCULAR; INTRAVENOUS; SUBCUTANEOUS at 10:26

## 2021-11-12 RX ADMIN — ONDANSETRON 4 MG: 2 INJECTION INTRAMUSCULAR; INTRAVENOUS at 09:14

## 2021-11-12 RX ADMIN — SODIUM CHLORIDE, POTASSIUM CHLORIDE, SODIUM LACTATE AND CALCIUM CHLORIDE 100 ML/HR: 600; 310; 30; 20 INJECTION, SOLUTION INTRAVENOUS at 19:42

## 2021-11-12 RX ADMIN — PROPOFOL 50 MG: 10 INJECTION, EMULSION INTRAVENOUS at 07:38

## 2021-11-12 RX ADMIN — FENTANYL CITRATE 50 MCG: 50 INJECTION INTRAMUSCULAR; INTRAVENOUS at 07:41

## 2021-11-12 RX ADMIN — FENTANYL CITRATE 50 MCG: 50 INJECTION INTRAMUSCULAR; INTRAVENOUS at 09:14

## 2021-11-12 RX ADMIN — SCOPALAMINE 1 PATCH: 1 PATCH, EXTENDED RELEASE TRANSDERMAL at 06:09

## 2021-11-12 RX ADMIN — PHENYLEPHRINE HYDROCHLORIDE 100 MCG: 10 INJECTION, SOLUTION INTRAVENOUS at 07:45

## 2021-11-12 RX ADMIN — PROPOFOL 150 MG: 10 INJECTION, EMULSION INTRAVENOUS at 07:34

## 2021-11-12 RX ADMIN — MIDAZOLAM 1 MG: 1 INJECTION INTRAMUSCULAR; INTRAVENOUS at 06:09

## 2021-11-12 RX ADMIN — EPHEDRINE SULFATE 10 MG: 50 INJECTION INTRAVENOUS at 08:09

## 2021-11-12 RX ADMIN — ROCURONIUM BROMIDE 50 MG: 50 INJECTION INTRAVENOUS at 07:34

## 2021-11-12 RX ADMIN — ONDANSETRON 4 MG: 2 INJECTION INTRAMUSCULAR; INTRAVENOUS at 16:09

## 2021-11-12 RX ADMIN — SODIUM CHLORIDE, POTASSIUM CHLORIDE, SODIUM LACTATE AND CALCIUM CHLORIDE 9 ML/HR: 600; 310; 30; 20 INJECTION, SOLUTION INTRAVENOUS at 06:01

## 2021-11-12 RX ADMIN — ONDANSETRON HYDROCHLORIDE 4 MG: 4 TABLET, FILM COATED ORAL at 23:15

## 2021-11-12 RX ADMIN — IBUPROFEN 600 MG: 600 TABLET, FILM COATED ORAL at 23:16

## 2021-11-12 RX ADMIN — GLYCOPYRROLATE 0.3 MG: 0.2 INJECTION INTRAMUSCULAR; INTRAVENOUS at 09:19

## 2021-11-12 RX ADMIN — KETOROLAC TROMETHAMINE 30 MG: 30 INJECTION, SOLUTION INTRAMUSCULAR at 09:20

## 2021-11-12 RX ADMIN — HYDROMORPHONE HYDROCHLORIDE: 10 INJECTION, SOLUTION INTRAMUSCULAR; INTRAVENOUS; SUBCUTANEOUS at 10:35

## 2021-11-12 RX ADMIN — FAMOTIDINE 20 MG: 20 TABLET, FILM COATED ORAL at 18:12

## 2021-11-12 RX ADMIN — DEXAMETHASONE SODIUM PHOSPHATE 10 MG: 10 INJECTION INTRAMUSCULAR; INTRAVENOUS at 07:45

## 2021-11-12 RX ADMIN — CEFAZOLIN SODIUM 2 G: 2 INJECTION, SOLUTION INTRAVENOUS at 07:39

## 2021-11-12 NOTE — OP NOTE
TOTAL ABDOMINAL HYSTERECTOMY  Procedure Report    Patient Name:  Mehnaz Denney  YOB: 1960    Date of Surgery:  11/12/2021     Indications:  Fibroid uterus    Pre-op Diagnosis:   Intramural leiomyoma of uterus [D25.1]  PMB (postmenopausal bleeding) [N95.0]       Post-Op Diagnosis Codes:     * Intramural leiomyoma of uterus [D25.1]     * PMB (postmenopausal bleeding) [N95.0]    Procedure/CPT® Codes:      Procedure(s):  TOTAL ABDOMINAL HYSTERECTOMY and bilateral salpingoopherectomy    Staff:  Surgeon(s):  Mabel Soto MD Nusz, Jean, MD         Anesthesia: General    Estimated Blood Loss: 100 mL    Implants:    Nothing was implanted during the procedure    Specimen:          Specimens     ID Source Type Tests Collected By Collected At Frozen?    A Uterus with Cervix, Bilateral Tubes and Ovaries Tissue · TISSUE PATHOLOGY EXAM   Mabel Soto MD 11/12/21 0805 No    Description: Uterus with Cervix, bilateral tubes and ovaries              Findings: enlarged nodular uterus, normal tubes and ovaries    Complications: none    Description of Procedure:   Procedure: The was taken to the operating room, placed in the supine position and given general anesthesia. She was prepped and draped in the usual sterile fashion. A vertical incision was made along the same healed incision already present.  approximately 2cm above the symphysis pubis and extended sharply to the rectus fascia. The fascia was then incised  with the curved Vargas scissors and the muscles of the anterior abdominal wall were  in the midline by sharp and blunt dissection.  The peritoneum was grasped between two pick-ups, elevated and entered sharply with the scalpel. The pelvis was examined with the findings noted above. A(n) martin retractor was placed into the incision and the bowel was packed away with moist laparotomy sponges. Two Jenifer clamps were placed on the cornua and used for retraction. The round ligaments on both  sides were clamped, transected and suture ligated with 0-Vicryl. The anterior leaf of the broad ligament was incised along the bladder reflection to the midline from both sides. The bladder was then gently dissected off the lower uterine segment and the cervix.  The infundibulopelvic ligaments on both sides were then doubly clamped, transected and suture ligated with 0-Vicryl and with hemostasis noted. The uterine arteries were skeletonized bilaterally, clamped with Zeppelin clamps, transected and suture ligated with 0-Vicryl. Again, hemostasis was assured. The body of the uterus was amputated and handed off secondary to a large posterior fibroid that limited exposure.  The rest of the procedure was to remove the cervical stump. The uterosacral ligaments were clamped on both sides, transected and suture ligated in a similar fashion. Curved clamps were then placed and the cervical stump was amputated and handed off. The vaginal cuff angles were closed with figure-of-eight stitches of 0-Vicryl. The remainder of the vaginal cuff was closed with a series of interrupted 0-Vicryl figure-of-eight stitches. Hemostasis was assured.  The pelvis was copiously irrigated with warmed normal saline. All laparotomy sponges and instruments were removed from the abdomen. The fascia was closed with a running 0-Vicryl suture. The subcutaneous tissue was irrigated and hemostasis was assured.  The subcutaneous tissues were closed with 3-0 vicryl.  The skin was closed with 4-0 vicryl in a subcuticular fashion.       All sponge, lap, needle and instrument counts were correct times two. The patient was taken to the recovery room in stable condition.       Dr Ellis was responsible for performing the following activities: Retraction, Suction, Irrigation, Suturing and Closing and their skilled assistance was necessary for the success of this case.      Mabel Soto MD     Date: 11/12/2021  Time: 09:57 EST

## 2021-11-12 NOTE — H&P
Patient Care Team:  Efrem Cantrell MD as PCP - General (Internal Medicine)  Mabel Soto MD as Consulting Physician (Obstetrics and Gynecology)    Chief complaint fibroid uterus    Subjective     Patient is a 61 y.o. female presents with uterine fibroids and postmenopausal bleeding.  Endometrial biopsy is negative for malignancy.  Pt also has bulk pressure symptoms from her large fibroid uterus. Hysterectomy was recommended.  Surgery was originally a year ago and delayed due to abnormal EKG.  Cardiology consult was reassuring but surgery was not rescheduled promptly due to pandemic issues.  In the past year no more bleeding episodes.  Pt presents for NAMRATA/BSO due to pressure symptoms.  RBA discussed with pt..    Review of Systems   Pertinent items are noted in HPI, all other systems reviewed and negative    History  Past Medical History:   Diagnosis Date   • Grief     X-   2021   • History of anemia    • History of kidney stones    • Hyperlipidemia    • Hypertension    • PONV (postoperative nausea and vomiting)    • Post-menopause bleeding    • Uterine fibroid      Past Surgical History:   Procedure Laterality Date   •  SECTION      TWINS   • CYSTOSCOPY W/ URETERAL STENT PLACEMENT Right 3/18/2021    Procedure: CYSTOSCOPY, RIGHT  URETERAL STENT INSERTION, AND RUSH CATHETER PLACEMENT;  Surgeon: Remy Bowens MD;  Location: [facility];  Service: Urology;  Laterality: Right;   • D & C WITH SUCTION     • D & C WITH SUCTION     • EXTRACORPOREAL SHOCKWAVE LITHOTRIPSY (ESWL), STENT INSERTION/REMOVAL Right 2021    Procedure: RIGHT EXTRACORPOREAL SHOCKWAVE LITHOTRIPSY;  Surgeon: Nito Felix MD;  Location: [facility];  Service: Urology;  Laterality: Right;   • LAPAROSCOPIC TUBAL LIGATION Bilateral    • WISDOM TOOTH EXTRACTION       Family History   Adopted: Yes   Problem Relation Age of Onset   • Hypertension Mother    • Hypertension Father    • No  Known Problems Daughter    • No Known Problems Daughter    • Malig Hyperthermia Neg Hx      Social History     Tobacco Use   • Smoking status: Never Smoker   • Smokeless tobacco: Never Used   Vaping Use   • Vaping Use: Never used   Substance Use Topics   • Alcohol use: Not Currently     Alcohol/week: 1.0 standard drink     Types: 1 Glasses of wine per week     Comment: Occ//Caffeine use: 2-3 cups daily   • Drug use: Defer     Medications Prior to Admission   Medication Sig Dispense Refill Last Dose   • amLODIPine (NORVASC) 5 MG tablet Take 1 tablet by mouth Daily. 30 tablet 5 11/12/2021 at 0400   • ferrous sulfate 325 (65 FE) MG tablet Take 1 tablet by mouth Daily With Breakfast. 90 tablet 1 11/11/2021 at Unknown time   • Multiple Vitamin (MULTI-VITAMIN DAILY PO) Take 1 tablet by mouth Daily.   11/11/2021 at Unknown time   • rosuvastatin (Crestor) 10 MG tablet Take 1 tablet by mouth Daily. 90 tablet 3 11/11/2021 at Unknown time     Allergies:  Patient has no known allergies.    Objective     Vital Signs  Temp:  [98.8 °F (37.1 °C)] 98.8 °F (37.1 °C)  Heart Rate:  [111] 111  Resp:  [16] 16  BP: (137)/(84) 137/84    Physical Exam:    General Appearance: alert, appears stated age and cooperative  Neck: no adenopathy, supple, trachea midline and no thyromegaly  Lungs: respirations regular, respirations even and respirations unlabored  Heart: regular rhythm & normal rate  Abdomen: normal bowel sounds, no masses, soft non-tender, no guarding and no rebound tenderness  Pelvic: uterus enlarged and nodular, 16 week size  Extremities: moves extremities well, no edema, no cyanosis and no redness  Neurologic: Mental Status orientated to person, place, time and situation, Speech normal content and execusion  Psych: normal    Results Review:    I reviewed the patient's new clinical results.    Assessment/Plan       Intramural leiomyoma of uterus    PMB (postmenopausal bleeding)      Total abdominal hysterectomy and bilateral  salpingoopherectomy planned    I discussed the patients findings and my recommendations with patient and family.     Mabel Soto MD  11/12/21  07:25 EST    Time:

## 2021-11-12 NOTE — ANESTHESIA POSTPROCEDURE EVALUATION
Patient: Mehnaz Denney    Procedure Summary     Date: 11/12/21 Room / [facility]:  [facility]  [facility]    Anesthesia Start: 0729 Anesthesia Stop: 0948    Procedure: TOTAL ABDOMINAL HYSTERECTOMY and bilateral salpingoopherectomy (Bilateral Abdomen) Diagnosis:       Intramural leiomyoma of uterus      PMB (postmenopausal bleeding)      (Intramural leiomyoma of uterus [D25.1])      (PMB (postmenopausal bleeding) [N95.0])    Surgeons: Mabel Soto MD Provider: Jr Simpson MD    Anesthesia Type: general ASA Status: 2          Anesthesia Type: general    Vitals  Vitals Value Taken Time   /74 11/12/21 1301   Temp 36.4 °C (97.5 °F) 11/12/21 1300   Pulse 95 11/12/21 1302   Resp 16 11/12/21 1300   SpO2 99 % 11/12/21 1302   Vitals shown include unvalidated device data.        Post Anesthesia Care and Evaluation    Patient location during evaluation: bedside  Patient participation: complete - patient participated  Level of consciousness: awake  Pain management: adequate  Airway patency: patent  Anesthetic complications: No anesthetic complications  PONV Status: controlled  Cardiovascular status: acceptable  Respiratory status: acceptable  Hydration status: acceptable    Comments: ---------------------------               11/12/21                      1524         ---------------------------   BP:          117/71         Pulse:         107          Resp:          16           Temp:   36.7 °C (98.1 °F)   SpO2:          96%         ---------------------------

## 2021-11-12 NOTE — ANESTHESIA PROCEDURE NOTES
Airway  Urgency: elective    Date/Time: 11/12/2021 7:40 AM    General Information and Staff    Patient location during procedure: OR  Anesthesiologist: Jr Simpson MD  CRNA: Yelena Almanza CRNA    Indications and Patient Condition  Indications for airway management: airway protection    Preoxygenated: yes  Mask difficulty assessment: 1 - vent by mask    Final Airway Details  Final airway type: endotracheal airway      Successful airway: ETT  Cuffed: yes   Successful intubation technique: video laryngoscopy  Facilitating devices/methods: intubating stylet and cricoid pressure  Endotracheal tube insertion site: oral  Blade: CMAC  Blade size: D  ETT size (mm): 7.0  Cormack-Lehane Classification: grade IIa - partial view of glottis  Placement verified by: capnometry   Measured from: lips  ETT/EBT  to lips (cm): 22  Number of attempts at approach: 1  Assessment: lips, teeth, and gum same as pre-op and atraumatic intubation    Additional Comments  Very anterior.

## 2021-11-13 LAB
DEPRECATED RDW RBC AUTO: 39 FL (ref 37–54)
ERYTHROCYTE [DISTWIDTH] IN BLOOD BY AUTOMATED COUNT: 14.5 % (ref 12.3–15.4)
HCT VFR BLD AUTO: 35.5 % (ref 34–46.6)
HGB BLD-MCNC: 11.3 G/DL (ref 12–15.9)
MCH RBC QN AUTO: 23.5 PG (ref 26.6–33)
MCHC RBC AUTO-ENTMCNC: 31.8 G/DL (ref 31.5–35.7)
MCV RBC AUTO: 74 FL (ref 79–97)
PLATELET # BLD AUTO: 141 10*3/MM3 (ref 140–450)
PMV BLD AUTO: 10.5 FL (ref 6–12)
RBC # BLD AUTO: 4.8 10*6/MM3 (ref 3.77–5.28)
WBC # BLD AUTO: 7.49 10*3/MM3 (ref 3.4–10.8)

## 2021-11-13 PROCEDURE — 99024 POSTOP FOLLOW-UP VISIT: CPT | Performed by: OBSTETRICS & GYNECOLOGY

## 2021-11-13 PROCEDURE — 85027 COMPLETE CBC AUTOMATED: CPT | Performed by: OBSTETRICS & GYNECOLOGY

## 2021-11-13 RX ADMIN — IBUPROFEN 600 MG: 600 TABLET, FILM COATED ORAL at 06:14

## 2021-11-13 RX ADMIN — IBUPROFEN 600 MG: 600 TABLET, FILM COATED ORAL at 17:48

## 2021-11-13 RX ADMIN — FAMOTIDINE 20 MG: 20 TABLET, FILM COATED ORAL at 17:48

## 2021-11-13 RX ADMIN — SODIUM CHLORIDE, POTASSIUM CHLORIDE, SODIUM LACTATE AND CALCIUM CHLORIDE 100 ML/HR: 600; 310; 30; 20 INJECTION, SOLUTION INTRAVENOUS at 06:10

## 2021-11-13 RX ADMIN — IBUPROFEN 600 MG: 600 TABLET, FILM COATED ORAL at 12:28

## 2021-11-13 RX ADMIN — DOCUSATE SODIUM 100 MG: 100 CAPSULE, LIQUID FILLED ORAL at 10:37

## 2021-11-13 RX ADMIN — FAMOTIDINE 20 MG: 20 TABLET, FILM COATED ORAL at 07:27

## 2021-11-13 RX ADMIN — IBUPROFEN 600 MG: 600 TABLET, FILM COATED ORAL at 23:51

## 2021-11-13 RX ADMIN — OXYCODONE AND ACETAMINOPHEN 1 TABLET: 5; 325 TABLET ORAL at 03:34

## 2021-11-13 RX ADMIN — DOCUSATE SODIUM 100 MG: 100 CAPSULE, LIQUID FILLED ORAL at 03:34

## 2021-11-13 NOTE — PROGRESS NOTES
Psychiatric  POST_OP PROGRESS NOTE    Post-Op Day 1 S/P Hysterectomy  Subjective     Patient reports:  Pain is  controlled with PCA.  She is ambulating. She is tolerating a liquid diet.    No nausea or vomiting.  Vaginal bleeding is minimal.      Objective      Vitals: Vital Signs Range for the last 24 hours  Temperature: Temp:  [97 °F (36.1 °C)-98.9 °F (37.2 °C)] 97.6 °F (36.4 °C)   Temp Source: Temp src: Oral   BP: BP: (111-139)/(60-85) 132/78   Pulse: Heart Rate:  [] 93   Respirations: Resp:  [16-18] 16   SPO2: SpO2:  [95 %-100 %] 96 %   O2 Amount (l/min): Flow (L/min):  [2-4] 2   O2 Devices Device (Oxygen Therapy): room air   Weight:              Physical Exam    Lungs clear to auscultation bilaterally   Abdomen Soft, non distended, incision with dry bandage   Incision  no erythema, no swelling   Extremities extremities normal, atraumatic, no cyanosis or edema     I reviewed the patient's new clinical results.    Lab Results (last 24 hours)     Procedure Component Value Units Date/Time    CBC (No Diff) [[id number]]  (Abnormal) Collected: 11/13/21 0454    Specimen: Blood Updated: 11/13/21 0517     WBC 7.49 10*3/mm3      RBC 4.80 10*6/mm3      Hemoglobin 11.3 g/dL      Hematocrit 35.5 %      MCV 74.0 fL      MCH 23.5 pg      MCHC 31.8 g/dL      RDW 14.5 %      RDW-SD 39.0 fl      MPV 10.5 fL      Platelets 141 10*3/mm3     Tissue Pathology Exam [[id number]] Collected: 11/12/21 0805    Specimen: Tissue from Uterus with Cervix, Bilateral Tubes and Ovaries Updated: 11/12/21 1042          Assessment/Plan        Intramural leiomyoma of uterus    PMB (postmenopausal bleeding)      Assessment:    Mehnaz Denney is POD 1 s/p hysterectomy.       Plan:   Ambulate  Advance diet  Discontinue posey catheter  Discussed surgery with pt and family        Mabel Soto MD  11/13/2021  09:35 EST  2

## 2021-11-13 NOTE — PLAN OF CARE
Goal Outcome Evaluation:  Midline abd dressing, dry & intact with gauze & tegaderm, reports good pain control with scheduled motrin, using narcotic pain med sparingly, VSS, voiding w/o difficulty, belching no flatus,tolerating regular diet, ambulated in guillory, plan for d/c tomorrow  Plan of Care Reviewed With: patient

## 2021-11-13 NOTE — PLAN OF CARE
Goal Outcome Evaluation:           Progress: improving  Outcome Summary: ivf, iv pca with relief, walked, f/c d/c this am-dtv, abd tod with quentin/tegaderm c/d/i, i/s

## 2021-11-14 ENCOUNTER — READMISSION MANAGEMENT (OUTPATIENT)
Dept: CALL CENTER | Facility: HOSPITAL | Age: 61
End: 2021-11-14

## 2021-11-14 VITALS
TEMPERATURE: 99 F | DIASTOLIC BLOOD PRESSURE: 79 MMHG | SYSTOLIC BLOOD PRESSURE: 132 MMHG | HEART RATE: 80 BPM | OXYGEN SATURATION: 97 % | RESPIRATION RATE: 16 BRPM

## 2021-11-14 PROCEDURE — 99024 POSTOP FOLLOW-UP VISIT: CPT | Performed by: OBSTETRICS & GYNECOLOGY

## 2021-11-14 RX ORDER — IBUPROFEN 600 MG/1
600 TABLET ORAL EVERY 6 HOURS SCHEDULED
Qty: 30 TABLET | Refills: 0 | Status: SHIPPED | OUTPATIENT
Start: 2021-11-14 | End: 2022-05-29

## 2021-11-14 RX ORDER — OXYCODONE HYDROCHLORIDE AND ACETAMINOPHEN 5; 325 MG/1; MG/1
1 TABLET ORAL EVERY 4 HOURS PRN
Qty: 25 TABLET | Refills: 0 | Status: SHIPPED | OUTPATIENT
Start: 2021-11-14 | End: 2021-11-19

## 2021-11-14 RX ADMIN — IBUPROFEN 600 MG: 600 TABLET, FILM COATED ORAL at 06:01

## 2021-11-14 RX ADMIN — DOCUSATE SODIUM 100 MG: 100 CAPSULE, LIQUID FILLED ORAL at 06:01

## 2021-11-14 RX ADMIN — FAMOTIDINE 20 MG: 20 TABLET, FILM COATED ORAL at 06:33

## 2021-11-14 RX ADMIN — SIMETHICONE 80 MG: 80 TABLET, CHEWABLE ORAL at 06:01

## 2021-11-14 NOTE — PLAN OF CARE
Goal Outcome Evaluation:  Plan of Care Reviewed With: patient        Progress: improving  Outcome Summary: sl, up ad jose, vdg, med with scheduled motrin, walked, passing gas, abd drsg c/d/i, poss d/c today

## 2021-11-14 NOTE — PROGRESS NOTES
[facility]  POST_OP PROGRESS NOTE    Post-Op Day 2 S/P Hysterectomy  Subjective     Patient reports:  Pain is  controlled with oral meds  She is ambulating. She is tolerating a regular diet.    No nausea or vomiting.  Vaginal bleeding is minimal. + flatus.      Objective      Vitals: Vital Signs Range for the last 24 hours  Temperature: Temp:  [97 °F (36.1 °C)-99 °F (37.2 °C)] 99 °F (37.2 °C)   Temp Source: Temp src: Oral   BP: BP: (128-134)/(78-84) 132/79   Pulse: Heart Rate:  [80-96] 80   Respirations: Resp:  [16] 16   SPO2: SpO2:  [94 %-99 %] 97 %   O2 Amount (l/min):     O2 Devices Device (Oxygen Therapy): room air   Weight:              Physical Exam    Lungs clear to auscultation bilaterally   Abdomen Soft , not distended   Incision  no drainage, no erythema, well approximated   Extremities extremities normal, atraumatic, no cyanosis or edema     I reviewed the patient's new clinical results.    Lab Results (last 24 hours)     ** No results found for the last 24 hours. **          Assessment/Plan        Intramural leiomyoma of uterus    PMB (postmenopausal bleeding)      Assessment:    Mehnaz Denney is POD 1 s/p hysterectomy.       Plan:   Pt doing well,   Discharge home today        Mabel Soto MD  11/14/2021  08:12 EST

## 2021-11-14 NOTE — DISCHARGE SUMMARY
Date of Discharge:  11/14/2021    Discharge Diagnosis: status post hysterectomy    Presenting Problem/History of Present Illness  Active Hospital Problems    Diagnosis  POA   • PMB (postmenopausal bleeding) [N95.0]  Unknown   • Intramural leiomyoma of uterus [D25.1]  Unknown      Resolved Hospital Problems   No resolved problems to display.           Hospital Course  Patient is a 61 y.o. female presented with fibroid uterus and postmenopausal bleeding for hysterectomy.  She underwent a NAMRATA/BSO wth  cc.  She did well postoperatively with normal return bowel function and good pain control.  Post operative hemoglobin was 11.  On POD 2 she was ready for discharge.      Procedures Performed    Procedure(s):  TOTAL ABDOMINAL HYSTERECTOMY and bilateral salpingoopherectomy  -------------------       Consults:   Consults     No orders found from 10/14/2021 to 11/13/2021.          Pertinent Test Results: labs: hemoglobin 11    Condition on Discharge:  good    Vital Signs  Temp:  [97 °F (36.1 °C)-99 °F (37.2 °C)] 99 °F (37.2 °C)  Heart Rate:  [80-96] 80  Resp:  [16] 16  BP: (128-134)/(78-84) 132/79    Physical Exam:   See progress note from today    Discharge Disposition  Home or Self Care    Discharge Medications     Discharge Medications      New Medications      Instructions Start Date   ibuprofen 600 MG tablet  Commonly known as: ADVIL,MOTRIN   600 mg, Oral, Every 6 Hours Scheduled      oxyCODONE-acetaminophen 5-325 MG per tablet  Commonly known as: PERCOCET   1 tablet, Oral, Every 4 Hours PRN         Continue These Medications      Instructions Start Date   amLODIPine 5 MG tablet  Commonly known as: NORVASC   5 mg, Oral, Daily      ferrous sulfate 325 (65 FE) MG tablet   325 mg, Oral, Daily With Breakfast      multivitamin tablet tablet  Commonly known as: THERAGRAN   1 tablet, Oral, Daily      rosuvastatin 10 MG tablet  Commonly known as: Crestor   10 mg, Oral, Daily             Discharge Diet:     Activity at  Discharge:     Follow-up Appointments  Future Appointments   Date Time Provider Department Center   1/7/2022  8:45 AM [facility]   1/14/2022  3:30 PM Efrem Cantrell MD [facility]   7/26/2022  8:40 AM Xena Yee MD [facility]         Test Results Pending at Discharge  Pending Labs     Order Current Status    Tissue Pathology Exam In process           Mabel Soto MD  11/14/21  08:16 EST    Time: Discharge 20 min

## 2021-11-14 NOTE — PLAN OF CARE
Goal Outcome Evaluation:  VSS, midline abd incision dry & intact with scant amount old drainage, steri-strip closures, up ad jose ambulated in guillory, voiding w/o difficulty, no c/o pain, no N/V, d/c home  Plan of Care Reviewed With: patient

## 2021-11-14 NOTE — OUTREACH NOTE
Prep Survey      Responses   [facility] patient discharged from? [place]   Is LACE score < 7 ? Yes   Emergency Room discharge w/ pulse ox? No   Eligibility [facility]   Date of Admission 11/12/21   Date of Discharge 11/14/21   Discharge Disposition Home or Self Care   Discharge diagnosis TOTAL ABDOMINAL HYSTERECTOMY    Does the patient have one of the following disease processes/diagnoses(primary or secondary)? General Surgery   Does the patient have Home health ordered? No   Is there a DME ordered? No   Prep survey completed? Yes          Susan Leal RN

## 2021-11-14 NOTE — PAYOR COMM NOTE
"Mehnaz Denney (61 y.o. Female)     DC SUMMARY FOR [id number]                Date of Birth Social Security Number Address Home Phone MRN    1960  [address] [place] [address] [phone] [id number]    Denominational Marital Status             Anglican Single       Admission Date Admission Type Admitting Provider Attending Provider Department, Room/Bed    11/12/21 Elective Mabel Soto MD  [address], P681/1    Discharge Date Discharge Disposition Discharge Destination          11/14/2021 Home or Self Care              Attending Provider: (none)   Allergies: No Known Allergies    Isolation: None   Infection: None   Code Status: Prior   Advance Care Planning Activity    Ht: 180.3 cm (71\")   Wt: 69.6 kg (153 lb 6.4 oz)    Admission Cmt: None   Principal Problem: None                Active Insurance as of 11/12/2021     Primary Coverage     Payor Plan Insurance Group Employer/Plan Group    ANTHEM BLUE CROSS ANTHEM BLUE CROSS BLUE SHIELD PPO [id number]     Payor Plan Address Payor Plan Phone Number Payor Plan Fax Number Effective Dates    [address] [phone]  9/1/2019 - None Entered    [place] [address]       Subscriber Name Subscriber Birth Date Member ID       MEHNAZ DENNEY 1960 [id number]                 Emergency Contacts      (Rel.) Home Phone Work Phone Mobile Phone    KIRSTIE DENNEY (Daughter) [phone] -- [phone]    AYANNA DENNEY (Daughter) [phone] -- [phone]               Discharge Summary      Mabel Soto MD at 11/14/21 0816            Date of Discharge:  11/14/2021    Discharge Diagnosis: status post hysterectomy    Presenting Problem/History of Present Illness  Active Hospital Problems    Diagnosis  POA   • PMB (postmenopausal bleeding) [N95.0]  Unknown   • Intramural leiomyoma of uterus [D25.1]  Unknown      Resolved Hospital Problems   No resolved problems to display.         "   Hospital Course  Patient is a 61 y.o. female presented with fibroid uterus and postmenopausal bleeding for hysterectomy.  She underwent a NAMRATA/BSO wth  cc.  She did well postoperatively with normal return bowel function and good pain control.  Post operative hemoglobin was 11.  On POD 2 she was ready for discharge.      Procedures Performed    Procedure(s):  TOTAL ABDOMINAL HYSTERECTOMY and bilateral salpingoopherectomy  -------------------       Consults:   Consults     No orders found from 10/14/2021 to 11/13/2021.          Pertinent Test Results: labs: hemoglobin 11    Condition on Discharge:  good    Vital Signs  Temp:  [97 °F (36.1 °C)-99 °F (37.2 °C)] 99 °F (37.2 °C)  Heart Rate:  [80-96] 80  Resp:  [16] 16  BP: (128-134)/(78-84) 132/79    Physical Exam:   See progress note from today    Discharge Disposition  Home or Self Care    Discharge Medications     Discharge Medications      New Medications      Instructions Start Date   ibuprofen 600 MG tablet  Commonly known as: ADVIL,MOTRIN   600 mg, Oral, Every 6 Hours Scheduled      oxyCODONE-acetaminophen 5-325 MG per tablet  Commonly known as: PERCOCET   1 tablet, Oral, Every 4 Hours PRN         Continue These Medications      Instructions Start Date   amLODIPine 5 MG tablet  Commonly known as: NORVASC   5 mg, Oral, Daily      ferrous sulfate 325 (65 FE) MG tablet   325 mg, Oral, Daily With Breakfast      multivitamin tablet tablet  Commonly known as: THERAGRAN   1 tablet, Oral, Daily      rosuvastatin 10 MG tablet  Commonly known as: Crestor   10 mg, Oral, Daily             Discharge Diet:     Activity at Discharge:     Follow-up Appointments  Future Appointments   Date Time Provider Department Center   1/7/2022  8:45 AM [facility]   1/14/2022  3:30 PM Efrem Cantrell MD [facility]   7/26/2022  8:40 AM Xena Yee MD [facility]         Test Results Pending at Discharge  Pending Labs     Order Current  Status    Tissue Pathology Exam In process           Mabel Soto MD  11/14/21  08:16 EST    Time: Discharge 20 min          Electronically signed by Mabel Soto MD at 11/14/21 0818

## 2021-11-15 ENCOUNTER — TELEPHONE (OUTPATIENT)
Dept: OBSTETRICS AND GYNECOLOGY | Age: 61
End: 2021-11-15

## 2021-11-15 ENCOUNTER — TRANSITIONAL CARE MANAGEMENT TELEPHONE ENCOUNTER (OUTPATIENT)
Dept: CALL CENTER | Facility: HOSPITAL | Age: 61
End: 2021-11-15

## 2021-11-15 NOTE — CASE MANAGEMENT/SOCIAL WORK
Case Management Discharge Note      Final Note: Discharge to home with family support. COLLIN         Selected Continued Care - Discharged on 11/14/2021 Admission date: 11/12/2021 - Discharge disposition: Home or Self Care    Destination    No services have been selected for the patient.              Durable Medical Equipment    No services have been selected for the patient.              Dialysis/Infusion    No services have been selected for the patient.              Home Medical Care    No services have been selected for the patient.              Therapy    No services have been selected for the patient.              Community Resources    No services have been selected for the patient.              Community & DME    No services have been selected for the patient.                  Transportation Services  Private: Car    Final Discharge Disposition Code: 01 - home or self-care

## 2021-11-15 NOTE — OUTREACH NOTE
Call Center TCM Note      Responses   [facility] patient discharged from? [place]   Does the patient have one of the following disease processes/diagnoses(primary or secondary)? General Surgery   TCM attempt successful? Yes   Call start time 1302   Call end time 1311   Discharge diagnosis TOTAL ABDOMINAL HYSTERECTOMY    Is patient permission given to speak with other caregiver? No   Meds reviewed with patient/caregiver? Yes   Does the patient have all medications related to this admission filled (includes all antibiotics, pain medications, etc.) Yes   Is the patient taking all medications as directed (includes completed medication regime)? Yes   Medication comments Patient contacted surgeons office due to constipation. Patient to start miralax today, drink plenty of water, walk as tolerated.    Does the patient have a follow up appointment scheduled with their surgeon? Yes  [11/29/21]   Has the patient kept scheduled appointments due by today? N/A   Comments PCP Dr Efrme Cantrell. Patient declined to schedule PCP hospital f/u appt with call today. She reports that she will keep her appt in January.    Has home health visited the patient within 72 hours of discharge? N/A   Psychosocial issues? No   Did the patient receive a copy of their discharge instructions? Yes   Nursing interventions Reviewed instructions with patient   Nursing interventions Nurse provided patient education   Is the patient /caregiver able to teach back basic post-op care? Keep incision areas clean,dry and protected   Is the patient/caregiver able to teach back signs and symptoms of incisional infection? Increased redness, swelling or pain at the incisonal site,  Increased drainage or bleeding,  Pus or odor from incision,  Fever   Is the patient/caregiver able to teach back steps to recovery at home? Set small, achievable goals for return to baseline health,  Rest and rebuild strength, gradually increase activity   If the patient is a  current smoker, are they able to teach back resources for cessation? Not a smoker   Is the patient/caregiver able to teach back the hierarchy of who to call/visit for symptoms/problems? PCP, Specialist, Home health nurse, Urgent Care, ED, 911 Yes   TCM call completed? Yes   Wrap up additional comments Patient denies further questions or needs today.           Janine Venegas RN    11/15/2021, 13:11 EST

## 2021-11-16 LAB
LAB AP CASE REPORT: NORMAL
PATH REPORT.FINAL DX SPEC: NORMAL
PATH REPORT.GROSS SPEC: NORMAL

## 2021-11-29 ENCOUNTER — OFFICE VISIT (OUTPATIENT)
Dept: OBSTETRICS AND GYNECOLOGY | Age: 61
End: 2021-11-29

## 2021-11-29 VITALS
SYSTOLIC BLOOD PRESSURE: 118 MMHG | DIASTOLIC BLOOD PRESSURE: 72 MMHG | HEIGHT: 71 IN | WEIGHT: 147.5 LBS | BODY MASS INDEX: 20.65 KG/M2

## 2021-11-29 DIAGNOSIS — Z90.710 STATUS POST HYSTERECTOMY: Primary | ICD-10-CM

## 2021-11-29 PROCEDURE — 99024 POSTOP FOLLOW-UP VISIT: CPT | Performed by: OBSTETRICS & GYNECOLOGY

## 2021-11-29 NOTE — PROGRESS NOTES
GYN Visit    2021    Patient: Mehnaz Denney          MR#:[id number]      Chief Complaint   Patient presents with   • Post-op Follow-up     Post-Op Hysterectomy, Last Pap 2020 (+), Last Mammo 2020, Cologuard 10/11/2021.       History of Present Illness    61 y.o. female  who presents for  Post op hyst  Doing well, off pain meds  Path was benign  Minimal vag bleeding          No LMP recorded (lmp unknown). Patient has had a hysterectomy.    ________________________________________  Patient Active Problem List   Diagnosis   • Iron deficiency anemia due to chronic blood loss   • Essential hypertension   • Uterine fibroid   • Hypercholesterolemia   • Postmenopausal bleeding   • Intramural leiomyoma of uterus   • PMB (postmenopausal bleeding)   • Right renal stone   • Iron deficiency anemia secondary to inadequate dietary iron intake       Past Medical History:   Diagnosis Date   • Grief     X-   2021   • History of anemia    • History of kidney stones    • Hyperlipidemia    • Hypertension    • PONV (postoperative nausea and vomiting)    • Post-menopause bleeding    • Uterine fibroid        Past Surgical History:   Procedure Laterality Date   •  SECTION      TWINS   • CYSTOSCOPY W/ URETERAL STENT PLACEMENT Right 3/18/2021    Procedure: CYSTOSCOPY, RIGHT  URETERAL STENT INSERTION, AND RUSH CATHETER PLACEMENT;  Surgeon: Remy Bowens MD;  Location: [facility];  Service: Urology;  Laterality: Right;   • D & C WITH SUCTION     • D & C WITH SUCTION     • EXTRACORPOREAL SHOCKWAVE LITHOTRIPSY (ESWL), STENT INSERTION/REMOVAL Right 2021    Procedure: RIGHT EXTRACORPOREAL SHOCKWAVE LITHOTRIPSY;  Surgeon: Nito Felix MD;  Location: [facility];  Service: Urology;  Laterality: Right;   • LAPAROSCOPIC TUBAL LIGATION Bilateral    • TOTAL ABDOMINAL HYSTERECTOMY Bilateral 2021    Procedure: TOTAL ABDOMINAL HYSTERECTOMY and bilateral  "salpingoopherectomy;  Surgeon: Mabel Soto MD;  [facility];  Service: Obstetrics/Gynecology;  Laterality: Bilateral;   • TOTAL ABDOMINAL HYSTERECTOMY WITH SALPINGO OOPHORECTOMY     • WISDOM TOOTH EXTRACTION         Social History     Tobacco Use   Smoking Status Never Smoker   Smokeless Tobacco Never Used       has a current medication list which includes the following prescription(s): amlodipine, ferrous sulfate, ibuprofen, multiple vitamin, and rosuvastatin.  ________________________________________    Current contraception: status post hysterectomy      The following portions of the patient's history were reviewed and updated as appropriate: allergies, current medications, past family history, past medical history, past social history, past surgical history and problem list.    Review of Systems    Pertinent items are noted in HPI.     Objective   Physical Exam    /72   Ht 180.3 cm (71\")   Wt 66.9 kg (147 lb 8 oz)   LMP  (LMP Unknown)   Breastfeeding No   BMI 20.57 kg/m²    BP Readings from Last 3 Encounters:   11/29/21 118/72   11/14/21 132/79   11/10/21 124/86      Wt Readings from Last 3 Encounters:   11/29/21 66.9 kg (147 lb 8 oz)   11/10/21 69.6 kg (153 lb 6.4 oz)   10/25/21 70.3 kg (155 lb)      BMI: Estimated body mass index is 20.57 kg/m² as calculated from the following:    Height as of this encounter: 180.3 cm (71\").    Weight as of this encounter: 66.9 kg (147 lb 8 oz).    Lungs: non labored breathing, no wheezing or tachpnea  Extremities: extremities normal, atraumatic, no cyanosis or edema  Skin: Skin color, texture, turgor normal. No rashes or lesions  Neurologic: Grossly normal  General:   alert, appears stated age and cooperative   Abdomen: soft, non-tender, without masses or organomegaly and incision healing well, steris removed                             Assessment:      Diagnoses and all orders for this visit:    1. Status post hysterectomy (Primary)      Doing " well, RTO 4 weeks for vag cuff check

## 2021-12-22 ENCOUNTER — APPOINTMENT (OUTPATIENT)
Dept: VACCINE CLINIC | Facility: HOSPITAL | Age: 61
End: 2021-12-22

## 2021-12-27 ENCOUNTER — OFFICE VISIT (OUTPATIENT)
Dept: OBSTETRICS AND GYNECOLOGY | Age: 61
End: 2021-12-27

## 2021-12-27 VITALS
WEIGHT: 145.2 LBS | HEIGHT: 71 IN | BODY MASS INDEX: 20.33 KG/M2 | SYSTOLIC BLOOD PRESSURE: 122 MMHG | DIASTOLIC BLOOD PRESSURE: 70 MMHG

## 2021-12-27 DIAGNOSIS — Z90.710 STATUS POST HYSTERECTOMY: Primary | ICD-10-CM

## 2021-12-27 PROCEDURE — 99024 POSTOP FOLLOW-UP VISIT: CPT | Performed by: OBSTETRICS & GYNECOLOGY

## 2021-12-27 NOTE — PROGRESS NOTES
GYN Visit    2021    Patient: Mehnaz Denney          MR#:[id number]      Chief Complaint   Patient presents with   • Post-op Follow-up     6w Post-Op Hysterectomy, Last Pap 2020 (+), Last Mammo 2020, Cologuard 10/11/2021.       History of Present Illness    61 y.o. female  who presents for  Postop hyst check  She is well, no issues  Due for mammogram  No SA- advised may need vaginal estrogen as atrophic on exam      No LMP recorded (lmp unknown). Patient has had a hysterectomy.    ________________________________________  Patient Active Problem List   Diagnosis   • Iron deficiency anemia due to chronic blood loss   • Essential hypertension   • Uterine fibroid   • Hypercholesterolemia   • Postmenopausal bleeding   • Intramural leiomyoma of uterus   • PMB (postmenopausal bleeding)   • Right renal stone   • Iron deficiency anemia secondary to inadequate dietary iron intake       Past Medical History:   Diagnosis Date   • Grief     X-   2021   • History of anemia    • History of kidney stones    • Hyperlipidemia    • Hypertension    • PONV (postoperative nausea and vomiting)    • Post-menopause bleeding    • Uterine fibroid        Past Surgical History:   Procedure Laterality Date   •  SECTION      TWINS   • CYSTOSCOPY W/ URETERAL STENT PLACEMENT Right 3/18/2021    Procedure: CYSTOSCOPY, RIGHT  URETERAL STENT INSERTION, AND RUSH CATHETER PLACEMENT;  Surgeon: Remy Bowens MD;  Location: [facility];  Service: Urology;  Laterality: Right;   • D & C WITH SUCTION     • D & C WITH SUCTION     • EXTRACORPOREAL SHOCKWAVE LITHOTRIPSY (ESWL), STENT INSERTION/REMOVAL Right 2021    Procedure: RIGHT EXTRACORPOREAL SHOCKWAVE LITHOTRIPSY;  Surgeon: Nito Felix MD;  Location: [facility];  Service: Urology;  Laterality: Right;   • LAPAROSCOPIC TUBAL LIGATION Bilateral    • TOTAL ABDOMINAL HYSTERECTOMY Bilateral 2021    Procedure: TOTAL  "ABDOMINAL HYSTERECTOMY and bilateral salpingoopherectomy;  Surgeon: Mabel Soto MD;  Location: [facility];  Service: Obstetrics/Gynecology;  Laterality: Bilateral;   • TOTAL ABDOMINAL HYSTERECTOMY WITH SALPINGO OOPHORECTOMY     • WISDOM TOOTH EXTRACTION         Social History     Tobacco Use   Smoking Status Never Smoker   Smokeless Tobacco Never Used       has a current medication list which includes the following prescription(s): amlodipine, ferrous sulfate, ibuprofen, multiple vitamin, and rosuvastatin.  ________________________________________    Current contraception: status post hysterectomy      The following portions of the patient's history were reviewed and updated as appropriate: allergies, current medications, past family history, past medical history, past social history, past surgical history and problem list.    Review of Systems    Pertinent items are noted in HPI.     Objective   Physical Exam    /70   Ht 180.3 cm (71\")   Wt 65.9 kg (145 lb 3.2 oz)   LMP  (LMP Unknown)   Breastfeeding No   BMI 20.25 kg/m²    BP Readings from Last 3 Encounters:   12/27/21 122/70   11/29/21 118/72   11/14/21 132/79      Wt Readings from Last 3 Encounters:   12/27/21 65.9 kg (145 lb 3.2 oz)   11/29/21 66.9 kg (147 lb 8 oz)   11/10/21 69.6 kg (153 lb 6.4 oz)      BMI: Estimated body mass index is 20.25 kg/m² as calculated from the following:    Height as of this encounter: 180.3 cm (71\").    Weight as of this encounter: 65.9 kg (145 lb 3.2 oz).    Lungs: non labored breathing, no wheezing or tachpnea  Extremities: extremities normal, atraumatic, no cyanosis or edema  Skin: Skin color, texture, turgor normal. No rashes or lesions  Neurologic: Grossly normal  General:   alert, appears stated age and cooperative   Abdomen: soft, non-tender, without masses or organomegaly and incision well healed       Vulva: normal   Vagina: normal mucosa, cuff well healed   Cervix: absent   Uterus: absent    Adnexa: no " mass, fullness, tenderness     Assessment:      Diagnoses and all orders for this visit:    1. Status post hysterectomy (Primary)        Schedule mammogram  Fu 1 year

## 2022-01-06 RX ORDER — FERROUS SULFATE 325(65) MG
325 TABLET ORAL
Qty: 90 TABLET | Refills: 1 | Status: SHIPPED | OUTPATIENT
Start: 2022-01-06 | End: 2022-10-25 | Stop reason: SDUPTHER

## 2022-01-24 ENCOUNTER — TELEPHONE (OUTPATIENT)
Dept: FAMILY MEDICINE CLINIC | Facility: CLINIC | Age: 62
End: 2022-01-24

## 2022-01-24 NOTE — TELEPHONE ENCOUNTER
Caller: Mehnaz Denney    Relationship: Self    Best call back number: [phone]   Requested Prescriptions:   Requested Prescriptions     Pending Prescriptions Disp Refills   • amLODIPine (NORVASC) 5 MG tablet 30 tablet 5     Sig: Take 1 tablet by mouth Daily.        Pharmacy where request should be sent: [facility] [address] AT [facility] [phone] [facility] [phone] [facility]     Does the patient have less than a 3 day supply:  [x] Yes  [] No    Arely Mckeon Rep   01/24/22 08:43 EST

## 2022-01-25 RX ORDER — AMLODIPINE BESYLATE 5 MG/1
5 TABLET ORAL DAILY
Qty: 30 TABLET | Refills: 5 | Status: SHIPPED | OUTPATIENT
Start: 2022-01-25 | End: 2022-02-18 | Stop reason: SDUPTHER

## 2022-02-15 ENCOUNTER — TELEMEDICINE (OUTPATIENT)
Dept: FAMILY MEDICINE CLINIC | Facility: TELEHEALTH | Age: 62
End: 2022-02-15

## 2022-02-15 DIAGNOSIS — H00.011 HORDEOLUM EXTERNUM OF RIGHT UPPER EYELID: Primary | ICD-10-CM

## 2022-02-15 PROCEDURE — 99213 OFFICE O/P EST LOW 20 MIN: CPT | Performed by: NURSE PRACTITIONER

## 2022-02-15 RX ORDER — POLYMYXIN B SULFATE AND TRIMETHOPRIM 1; 10000 MG/ML; [USP'U]/ML
1 SOLUTION OPHTHALMIC EVERY 4 HOURS
Qty: 10 ML | Refills: 0 | Status: SHIPPED | OUTPATIENT
Start: 2022-02-15 | End: 2022-05-29

## 2022-02-15 NOTE — PROGRESS NOTES
You have chosen to receive care through a telehealth visit.  Do you consent to use a video/audio connection for your medical care today? Yes     CHIEF COMPLAINT  Chief Complaint   Patient presents with   • Chalazion     right eye present for 2 months         HPI  Mehnaz Denney is a 61 y.o. female  presents with complaint of a bump on her upper right eye lid that has been present for 2 months. She states she thinks it is a chalazion. She has been treating this with warm compresses without any improvement. She reports no pain, itching, loss of vision, trauma to eye or h/o similar occurrence. She reports no associated signs or symptoms.     Review of Systems   Constitutional: Negative.    HENT: Negative.    Eyes: Negative for photophobia, pain, discharge, redness, itching and visual disturbance.        Bump on upper inner right eye lid on the under side of the upper lid.    Musculoskeletal: Negative.    Skin: Negative.    Neurological: Negative.    Hematological: Negative.    Psychiatric/Behavioral: Negative.        Past Medical History:   Diagnosis Date   • Grief     X-   2021   • History of anemia    • History of kidney stones    • Hyperlipidemia    • Hypertension    • PONV (postoperative nausea and vomiting)    • Post-menopause bleeding    • Uterine fibroid        Family History   Adopted: Yes   Problem Relation Age of Onset   • Hypertension Mother    • Hypertension Father    • No Known Problems Daughter    • No Known Problems Daughter    • Malig Hyperthermia Neg Hx        Social History     Socioeconomic History   • Marital status: Single   Tobacco Use   • Smoking status: Never Smoker   • Smokeless tobacco: Never Used   Vaping Use   • Vaping Use: Never used   Substance and Sexual Activity   • Alcohol use: Not Currently     Alcohol/week: 1.0 standard drink     Types: 1 Glasses of wine per week     Comment: Occ//Caffeine use: 2-3 cups daily   • Drug use: Defer   • Sexual activity: Defer      Partners: Male     Birth control/protection: Tubal ligation         LMP  (LMP Unknown)     PHYSICAL EXAM  Physical Exam   Constitutional: She is oriented to person, place, and time. She appears well-developed and well-nourished. She does not have a sickly appearance. She does not appear ill. No distress.   HENT:   Head: Normocephalic and atraumatic.   Eyes: Conjunctivae are normal. Right eye exhibits edema. Right eye exhibits no discharge, no exudate and no hordeolum. No foreign body present in the right eye. Left eye exhibits no discharge, no exudate, no hordeolum and no edema. No foreign body present in the left eye. Right conjunctiva is not injected. Right conjunctiva has no hemorrhage. Left conjunctiva is not injected. Left conjunctiva has no hemorrhage. No scleral icterus.   Small internal papule of the inner aspect of the upper eyelid.  Non tender. No redness or drainage. No periorbital swelling or tenderness. No injection.    Cardiovascular:   No conjunctival pallor noted.    Neurological: She is alert and oriented to person, place, and time.   Psychiatric: She has a normal mood and affect.   Vitals reviewed.      Results for orders placed or performed during the hospital encounter of 11/12/21   CBC (No Diff)    Specimen: Blood   Result Value Ref Range    WBC 7.49 3.40 - 10.80 10*3/mm3    RBC 4.80 3.77 - 5.28 10*6/mm3    Hemoglobin 11.3 (L) 12.0 - 15.9 g/dL    Hematocrit 35.5 34.0 - 46.6 %    MCV 74.0 (L) 79.0 - 97.0 fL    MCH 23.5 (L) 26.6 - 33.0 pg    MCHC 31.8 31.5 - 35.7 g/dL    RDW 14.5 12.3 - 15.4 %    RDW-SD 39.0 37.0 - 54.0 fl    MPV 10.5 6.0 - 12.0 fL    Platelets 141 140 - 450 10*3/mm3   Tissue Pathology Exam    Specimen: Uterus with Cervix, Bilateral Tubes and Ovaries; Tissue   Result Value Ref Range    Case Report       Surgical Pathology Report                         Case: [id number]                                  Authorizing Provider:  Mabel Soto MD        Collected:            "11/12/2021 08:05 AM          Ordering Location:     [facility]  Received:            11/12/2021 10:42 AM                                 [facility] OR                                                                       Pathologist:           Susan Carballo MD                                                          Specimen:    Uterus with Cervix, Bilateral Tubes and Ovaries, Uterus with Cervix, bilateral tubes                and ovaries                                                                                Final Diagnosis       1.  Uterus, Cervix, Bilateral Fallopian Tubes and Ovaries, Total Abdominal Hysterectomy and Bilateral Salpingo-Oophorectomy:    A. Cervix with inflamed low grade squamous intraepithelial lesion (LSIL/ATUL-1).     B. Adenomyosis.   C. Leiomyomata (largest measures 6.0 cm in greatest dimension).    D. Benign endometrial polyp.   E. Weakly proliferative endometrium.    F. Uterine serosa with adhesion formation.   G. Right fallopian tube with paratubal cyst.   H. Left fallopian tube with with no significant histopathologic changes.    I.  Bilateral ovaries with no significant histopathologic changes.    jab/brb       Gross Description       1. Received in formalin labeled \"uterus and cervix, bilateral tubes and ovaries\" is a 490 gram supracervical amputated uterus with amputated cervix and bilateral adnexa which enables orientation of the uterus.  The uterus measures 12.0 x 9.5 x 9.0 cm and displays a smooth tan red serosal surface with a minimal amount of fibrinous adhesions on the fundus.  The uterus is bivalved to reveal a triangular endometrial cavity which measures 5 cm from cornu to cornu and 6.7 cm in length.  The endometrium is tan glistening, averaging 0.2 cm in thickness.  The myometrium is tan pink with pronounced myometrial trabeculations, averaging 3.6 cm in thickness.  There are multiple submucosal, subserosal intramural and transmural white whorled nodules " ranging from 0.5 cm to 6 cm in greatest dimension.  The largest is transmural and located in the fundus.  The amputated cervix (3.2 x 2.6 x 1.5 cm) displays a 0.5 cm slit-like patent os.  The cervix cannot be oriented.  The 2.5 cm long endocervical canal displays a tan pink glistening mucosa with no discrete lesions or masses.  The left ovary (4 gram, 2.6 x 2.0 x 0.8 cm) displays a smooth tan pink intact cerebriform surface.  Sectioning through the left ovary reveals variegated tan pink parenchyma.  The left discontinuous fimbriated fallopian tube (4.0 x 0.6 cm) displays a smooth tan red serosa, a pinpoint lumen and wall thickness averaging 0.2 cm.  The right ovary (4 gram, 2.5 x 2.2 x 1.0 cm) displays a smooth tan pink intact cerebriform surface.  Sectioning through the right ovary reveals variegated tan pink parenchyma.  The right discontinuous fimbriated fallopian tube (5.5 x 0.5 cm) displays a smooth tan red serosa, a pinpoint lumen and wall thickness averaging 0.2 cm.  Representative sections are submitted as follows:  1A-1B - endo-ectocervix  1C - anterior endomyometrium to include submucosal white whorled nodule, superficial   1D - posterior endomyometrium, superficial  1E-1F - posterior submucosal white whorled nodule, 2 cm, in its entirety  1G-1H - largest transmural white whorled nodule, fundus, one section per centimeter (6 cm)  1I - serosal adhesions on the uterine fundus  1J - three random subserosal white whorled nodules, anterior, one section per nodule  1K - three subserosal white whorled nodules, posterior, one section per nodule  1L - three random intramural white whorled nodules, anterior  1M - three random intramural white whorled nodules, posterior   1N - left ovary  1O - left fallopian tube  1P - right ovary  1Q - right fallopian tube    jap/uso/jaedison/alex     Additional sections:  1R-1AA - remainder of the cervix         Diagnoses and all orders for this visit:    1. Hordeolum externum of right  upper eyelid (Primary)  -     trimethoprim-polymyxin b (Polytrim) 32890-7.1 UNIT/ML-% ophthalmic solution; Administer 1 drop to the right eye Every 4 (Four) Hours.  Dispense: 10 mL; Refill: 0    if no improvement in 7-10 days follow up with ophthalmology for further evaluation and in person assessment.     Warm compresses prn       FOLLOW-UP  As discussed during visit with PCP/[facility] if no improvement or Urgent Care/Emergency Department if worsening of symptoms    Patient verbalizes understanding of medication dosage, comfort measures, instructions for treatment and follow-up.    Heidi Brush, APRN  02/15/2022  11:04 EST    This visit was performed via Telehealth.  This patient has been instructed to follow-up with their primary care provider if their symptoms worsen or the treatment provided does not resolve their illness.

## 2022-02-18 RX ORDER — AMLODIPINE BESYLATE 5 MG/1
5 TABLET ORAL DAILY
Qty: 30 TABLET | Refills: 5 | Status: SHIPPED | OUTPATIENT
Start: 2022-02-18 | End: 2023-01-05

## 2022-02-18 NOTE — TELEPHONE ENCOUNTER
Rx Refill Note  Requested Prescriptions     Pending Prescriptions Disp Refills   • amLODIPine (NORVASC) 5 MG tablet 30 tablet 5     Sig: Take 1 tablet by mouth Daily.      Last office visit with prescribing clinician: 7/29/2021      Next office visit with prescribing clinician: 3/3/2022            Karyn Lee MA  02/18/22, 09:42 EST

## 2022-02-18 NOTE — TELEPHONE ENCOUNTER
Caller: Mehnaz Denney    Relationship: Self    Best call back number: [phone]    Requested Prescriptions:   Requested Prescriptions     Pending Prescriptions Disp Refills   • amLODIPine (NORVASC) 5 MG tablet 30 tablet 5     Sig: Take 1 tablet by mouth Daily.        Pharmacy where request should be sent: [facility] [address] AT [facility] [phone] [facility] [phone] [facility]     Additional details provided by patient: HAS LESS THAN 3 DAYS.    Does the patient have less than a 3 day supply:  [x] Yes  [] No    Susan Crisostomo, PCT   02/18/22 09:04 EST

## 2022-03-21 RX ORDER — ROSUVASTATIN CALCIUM 10 MG/1
TABLET, COATED ORAL
Qty: 90 TABLET | Refills: 3 | Status: SHIPPED | OUTPATIENT
Start: 2022-03-21 | End: 2023-03-03

## 2022-04-11 ENCOUNTER — TELEPHONE (OUTPATIENT)
Dept: FAMILY MEDICINE CLINIC | Facility: CLINIC | Age: 62
End: 2022-04-11

## 2022-04-11 NOTE — TELEPHONE ENCOUNTER
Caller: Mehnaz Denney    Relationship to patient: Self    Best call back number: [phone]    Patient is needing: PT IS NEEDING TO RESCHEDULE LABS. UNABLE TO WT, NO ANSWER

## 2022-04-25 ENCOUNTER — TELEPHONE (OUTPATIENT)
Dept: FAMILY MEDICINE CLINIC | Facility: CLINIC | Age: 62
End: 2022-04-25

## 2022-04-25 NOTE — TELEPHONE ENCOUNTER
Hub staff attempted to follow warm transfer process and was unsuccessful     Caller: Mehnaz Denney    Relationship to patient: Self    Best call back number: [phone]    Patient is needing: NEEDS TO RESCHEDULE LAB APPOINTMENT

## 2022-05-29 ENCOUNTER — TELEMEDICINE (OUTPATIENT)
Dept: FAMILY MEDICINE CLINIC | Facility: TELEHEALTH | Age: 62
End: 2022-05-29

## 2022-05-29 DIAGNOSIS — J32.9 SINUSITIS, UNSPECIFIED CHRONICITY, UNSPECIFIED LOCATION: Primary | ICD-10-CM

## 2022-05-29 PROCEDURE — 99213 OFFICE O/P EST LOW 20 MIN: CPT | Performed by: NURSE PRACTITIONER

## 2022-05-29 RX ORDER — AMOXICILLIN AND CLAVULANATE POTASSIUM 875; 125 MG/1; MG/1
1 TABLET, FILM COATED ORAL 2 TIMES DAILY
Qty: 14 TABLET | Refills: 0 | Status: SHIPPED | OUTPATIENT
Start: 2022-05-29 | End: 2022-06-05

## 2022-05-29 RX ORDER — METHYLPREDNISOLONE 4 MG/1
TABLET ORAL
Qty: 1 EACH | Refills: 0 | Status: SHIPPED | OUTPATIENT
Start: 2022-05-29 | End: 2022-06-10

## 2022-05-29 NOTE — PROGRESS NOTES
You have chosen to receive care through a telehealth visit.The use of a video visit has been reviewed with the patient and verbal informed consent has been obtained. Video Options: MyChart/Zoom The visit included audio and video interaction. No technical issues occurred during this visit.  Pt Location: [place], [place]  Provider location: [place], [place]  Video Visit Reason:   Free Text Description: [facility]    Chief Complaint  Sinusitis    Subjective          Mehnaz Denney presents to [facility]  She  has a past medical history of Grief, History of anemia, History of kidney stones (2021), Hyperlipidemia, Hypertension, PONV (postoperative nausea and vomiting), Post-menopause bleeding, and Uterine fibroid.  She does not have any pertinent problems on file.  Current Outpatient Medications   Medication Sig Dispense Refill   • amLODIPine (NORVASC) 5 MG tablet Take 1 tablet by mouth Daily. 30 tablet 5   • amoxicillin-clavulanate (AUGMENTIN) 875-125 MG per tablet Take 1 tablet by mouth 2 (Two) Times a Day for 7 days. 14 tablet 0   • ferrous sulfate 325 (65 FE) MG tablet Take 1 tablet by mouth Daily With Breakfast. 90 tablet 1   • methylPREDNISolone (MEDROL) 4 MG dose pack Take as directed on package instructions. 1 each 0   • Multiple Vitamin (MULTI-VITAMIN DAILY PO) Take 1 tablet by mouth Daily.     • rosuvastatin (CRESTOR) 10 MG tablet TAKE ONE TABLET BY MOUTH DAILY 90 tablet 3     No current facility-administered medications for this visit.     She has No Known Allergies.  Sinus pain, pressure, headache over the last week with increasing severity. Right sided sinus pressure is worse. Throat feels scratchy due to postnasal drainage which is also causing cough to clear secretions. No fever, chills, SOA or wheezing.    Sinusitis  This is a new problem. The current episode started in the past 7 days. The problem has been gradually worsening since onset. There has been no fever. Associated  symptoms include congestion, coughing, headaches, a hoarse voice, sinus pressure and sneezing. Pertinent negatives include no chills, ear pain, shortness of breath, sore throat or swollen glands.     Objective   Vital Signs:   There were no vitals taken for this visit.    Physical Exam   Constitutional: She appears well-nourished. No distress.   HENT:   Nose: Congestion present. Right sinus exhibits maxillary sinus tenderness and frontal sinus tenderness. Left sinus exhibits no maxillary sinus tenderness and no frontal sinus tenderness.   Mouth/Throat: Mouth/Lips are normal.  Pulmonary/Chest: Effort normal.  No respiratory distress.    Result Review :                 Assessment and Plan    Diagnoses and all orders for this visit:    1. Sinusitis, unspecified chronicity, unspecified location (Primary)  -     amoxicillin-clavulanate (AUGMENTIN) 875-125 MG per tablet; Take 1 tablet by mouth 2 (Two) Times a Day for 7 days.  Dispense: 14 tablet; Refill: 0  -     methylPREDNISolone (MEDROL) 4 MG dose pack; Take as directed on package instructions.  Dispense: 1 each; Refill: 0        Take antibiotic until gone. May take probiotic with antibiotic if prone to antibiotic induced diarrhea. Flonase is recommended for daily use by most ENTs if you have Allergic or Reactive sinus problems. It will help with nasal congestion, but takes several days to become fully effective and is not a fast acting medication. It is also recommended to start and continue Claritin, Zyrtec or Allegra daily to control postnasal drainage, if you are prone to reactive sinus issues due to weather or seasonal changes. Cool mist humidifier at bedside will help secretions remain thin and more easily drain, relieving the pressure in your sinuses. Follow up with PCP, Urgent Care or Video Visit if symptoms have not resolved in 7-10 days. If symptoms worse, go to Urgent Care or follow up with PCP. If you develop high fever, chest pain, shortness of breath or  any life-threatening symptoms, go to nearest Emergency Department.        I spent 20 minutes caring for Mehnaz on this date of service. This time includes time spent by me in the following activities:preparing for the visit, obtaining and/or reviewing a separately obtained history, performing a medically appropriate examination and/or evaluation , counseling and educating the patient/family/caregiver, ordering medications, tests, or procedures, and documenting information in the medical record    Mehnaz Denney  reports that she has never smoked. She has never used smokeless tobacco.     Follow Up   Return if symptoms worsen or fail to improve.  Patient was given instructions and counseling regarding her condition or for health maintenance advice. Please see specific information pulled into the AVS if appropriate.

## 2022-06-06 DIAGNOSIS — E78.00 HYPERCHOLESTEROLEMIA: Primary | ICD-10-CM

## 2022-06-07 LAB
ALBUMIN SERPL-MCNC: 4.4 G/DL (ref 3.8–4.9)
ALBUMIN/GLOB SERPL: 1.4 {RATIO} (ref 1.2–2.2)
ALP SERPL-CCNC: 136 IU/L (ref 44–121)
ALT SERPL-CCNC: 31 IU/L (ref 0–32)
APPEARANCE UR: CLEAR
AST SERPL-CCNC: 17 IU/L (ref 0–40)
BACTERIA #/AREA URNS HPF: ABNORMAL /[HPF]
BASOPHILS # BLD AUTO: 0 X10E3/UL (ref 0–0.2)
BASOPHILS NFR BLD AUTO: 1 %
BILIRUB SERPL-MCNC: 0.5 MG/DL (ref 0–1.2)
BILIRUB UR QL STRIP: NEGATIVE
BUN SERPL-MCNC: 12 MG/DL (ref 8–27)
BUN/CREAT SERPL: 16 (ref 12–28)
CALCIUM SERPL-MCNC: 11.1 MG/DL (ref 8.7–10.3)
CASTS URNS QL MICRO: ABNORMAL /LPF
CHLORIDE SERPL-SCNC: 106 MMOL/L (ref 96–106)
CHOLEST SERPL-MCNC: 164 MG/DL (ref 100–199)
CO2 SERPL-SCNC: 26 MMOL/L (ref 20–29)
COLOR UR: YELLOW
CREAT SERPL-MCNC: 0.77 MG/DL (ref 0.57–1)
EGFRCR SERPLBLD CKD-EPI 2021: 88 ML/MIN/1.73
EOSINOPHIL # BLD AUTO: 0.1 X10E3/UL (ref 0–0.4)
EOSINOPHIL NFR BLD AUTO: 2 %
EPI CELLS #/AREA URNS HPF: ABNORMAL /HPF (ref 0–10)
ERYTHROCYTE [DISTWIDTH] IN BLOOD BY AUTOMATED COUNT: 15.1 % (ref 11.7–15.4)
GLOBULIN SER CALC-MCNC: 3.1 G/DL (ref 1.5–4.5)
GLUCOSE SERPL-MCNC: 90 MG/DL (ref 65–99)
GLUCOSE UR QL STRIP: NEGATIVE
HCT VFR BLD AUTO: 43.8 % (ref 34–46.6)
HDLC SERPL-MCNC: 66 MG/DL
HGB BLD-MCNC: 14.1 G/DL (ref 11.1–15.9)
HGB UR QL STRIP: ABNORMAL
IMM GRANULOCYTES # BLD AUTO: 0 X10E3/UL (ref 0–0.1)
IMM GRANULOCYTES NFR BLD AUTO: 0 %
KETONES UR QL STRIP: NEGATIVE
LDLC SERPL CALC-MCNC: 83 MG/DL (ref 0–99)
LDLC/HDLC SERPL: 1.3 RATIO (ref 0–3.2)
LEUKOCYTE ESTERASE UR QL STRIP: NEGATIVE
LYMPHOCYTES # BLD AUTO: 1.1 X10E3/UL (ref 0.7–3.1)
LYMPHOCYTES NFR BLD AUTO: 34 %
MCH RBC QN AUTO: 23.8 PG (ref 26.6–33)
MCHC RBC AUTO-ENTMCNC: 32.2 G/DL (ref 31.5–35.7)
MCV RBC AUTO: 74 FL (ref 79–97)
MICRO URNS: ABNORMAL
MONOCYTES # BLD AUTO: 0.3 X10E3/UL (ref 0.1–0.9)
MONOCYTES NFR BLD AUTO: 10 %
NEUTROPHILS # BLD AUTO: 1.7 X10E3/UL (ref 1.4–7)
NEUTROPHILS NFR BLD AUTO: 53 %
NITRITE UR QL STRIP: NEGATIVE
PH UR STRIP: 6.5 [PH] (ref 5–7.5)
PLATELET # BLD AUTO: 199 X10E3/UL (ref 150–450)
POTASSIUM SERPL-SCNC: 4.2 MMOL/L (ref 3.5–5.2)
PROT SERPL-MCNC: 7.5 G/DL (ref 6–8.5)
PROT UR QL STRIP: NEGATIVE
RBC # BLD AUTO: 5.92 X10E6/UL (ref 3.77–5.28)
RBC #/AREA URNS HPF: ABNORMAL /HPF (ref 0–2)
SODIUM SERPL-SCNC: 143 MMOL/L (ref 134–144)
SP GR UR STRIP: 1.01 (ref 1–1.03)
TRIGL SERPL-MCNC: 82 MG/DL (ref 0–149)
UROBILINOGEN UR STRIP-MCNC: 0.2 MG/DL (ref 0.2–1)
VLDLC SERPL CALC-MCNC: 15 MG/DL (ref 5–40)
WBC # BLD AUTO: 3.1 X10E3/UL (ref 3.4–10.8)
WBC #/AREA URNS HPF: ABNORMAL /HPF (ref 0–5)

## 2022-06-10 ENCOUNTER — OFFICE VISIT (OUTPATIENT)
Dept: FAMILY MEDICINE CLINIC | Facility: CLINIC | Age: 62
End: 2022-06-10

## 2022-06-10 VITALS
OXYGEN SATURATION: 99 % | SYSTOLIC BLOOD PRESSURE: 126 MMHG | BODY MASS INDEX: 21.14 KG/M2 | HEART RATE: 103 BPM | HEIGHT: 71 IN | TEMPERATURE: 96.8 F | WEIGHT: 151 LBS | DIASTOLIC BLOOD PRESSURE: 78 MMHG | RESPIRATION RATE: 18 BRPM

## 2022-06-10 DIAGNOSIS — E78.00 HYPERCHOLESTEROLEMIA: Primary | ICD-10-CM

## 2022-06-10 DIAGNOSIS — I10 ESSENTIAL HYPERTENSION: ICD-10-CM

## 2022-06-10 PROCEDURE — 99214 OFFICE O/P EST MOD 30 MIN: CPT | Performed by: INTERNAL MEDICINE

## 2022-06-10 NOTE — PROGRESS NOTES
Subjective   Mehnaz Denney is a 61 y.o. female. Patient is here today for   Chief Complaint   Patient presents with   • Hypertension     Lab follow up          Vitals:    06/10/22 1429   BP: 126/78   Pulse: 103   Resp: 18   Temp: 96.8 °F (36 °C)   SpO2: 99%     Body mass index is 21.07 kg/m².      Past Medical History:   Diagnosis Date   • Grief     X-   2021   • History of anemia    • History of kidney stones    • Hyperlipidemia    • Hypertension    • PONV (postoperative nausea and vomiting)    • Post-menopause bleeding    • Uterine fibroid       No Known Allergies   Social History     Socioeconomic History   • Marital status: Single   Tobacco Use   • Smoking status: Never Smoker   • Smokeless tobacco: Never Used   Vaping Use   • Vaping Use: Never used   Substance and Sexual Activity   • Alcohol use: Not Currently     Alcohol/week: 1.0 standard drink     Types: 1 Glasses of wine per week     Comment: Occ//Caffeine use: 2-3 cups daily   • Drug use: Defer   • Sexual activity: Defer     Partners: Male     Birth control/protection: Tubal ligation        Current Outpatient Medications:   •  amLODIPine (NORVASC) 5 MG tablet, Take 1 tablet by mouth Daily., Disp: 30 tablet, Rfl: 5  •  ferrous sulfate 325 (65 FE) MG tablet, Take 1 tablet by mouth Daily With Breakfast., Disp: 90 tablet, Rfl: 1  •  methylPREDNISolone (MEDROL) 4 MG dose pack, Take as directed on package instructions., Disp: 1 each, Rfl: 0  •  Multiple Vitamin (MULTI-VITAMIN DAILY PO), Take 1 tablet by mouth Daily., Disp: , Rfl:   •  rosuvastatin (CRESTOR) 10 MG tablet, TAKE ONE TABLET BY MOUTH DAILY, Disp: 90 tablet, Rfl: 3     Objective     She is here to follow-up on lab work from last week.    Since she and I saw each other last she had a hysterectomy and this has resolved her iron deficiency anemia.    She feels well.    Hypertension         Review of Systems   Constitutional: Negative.    HENT: Negative.    Respiratory: Negative.     Cardiovascular: Negative.    Musculoskeletal: Negative.    Psychiatric/Behavioral: Negative.        Physical Exam  Vitals and nursing note reviewed.   Constitutional:       Appearance: Normal appearance.      Comments: Pleasant, neatly groomed, no distress.  BMI 21.   Cardiovascular:      Heart sounds: Normal heart sounds. No murmur heard.    No gallop.   Pulmonary:      Effort: No respiratory distress.      Breath sounds: Normal breath sounds. No wheezing or rales.   Neurological:      Mental Status: She is alert and oriented to person, place, and time.   Psychiatric:         Mood and Affect: Mood normal.         Behavior: Behavior normal.         Thought Content: Thought content normal.           Problems Addressed this Visit        Cardiac and Vasculature    Essential hypertension    Hypercholesterolemia - Primary      Diagnoses       Codes Comments    Hypercholesterolemia    -  Primary ICD-10-CM: E78.00  ICD-9-CM: 272.0     Essential hypertension     ICD-10-CM: I10  ICD-9-CM: 401.9             PLAN  She and I reviewed her labs.  Her hypercholesterolemia is very well controlled on rosuvastatin 10 mg daily.    Her hypertension is well controlled on amlodipine 5 mg daily.    I asked her to follow-up for a comprehensive physical exam in about 6 months.  Fasting labs prior to that visit should include: Lipid profile, comprehensive metabolic panel, CBC, urinalysis, vitamin D level (to screen for vitamin D deficiency).  No follow-ups on file.

## 2022-08-16 ENCOUNTER — OFFICE VISIT (OUTPATIENT)
Dept: CARDIOLOGY | Facility: CLINIC | Age: 62
End: 2022-08-16

## 2022-08-16 VITALS
DIASTOLIC BLOOD PRESSURE: 82 MMHG | BODY MASS INDEX: 22.66 KG/M2 | HEART RATE: 87 BPM | SYSTOLIC BLOOD PRESSURE: 132 MMHG | WEIGHT: 153 LBS | HEIGHT: 69 IN

## 2022-08-16 DIAGNOSIS — U07.1 COVID-19 VIRUS DETECTED: ICD-10-CM

## 2022-08-16 DIAGNOSIS — E78.00 HYPERCHOLESTEROLEMIA: ICD-10-CM

## 2022-08-16 DIAGNOSIS — I42.2 ASYMMETRIC SEPTAL HYPERTROPHY: ICD-10-CM

## 2022-08-16 DIAGNOSIS — I45.2 INCOMPLETE RIGHT BUNDLE BRANCH BLOCK (RBBB) WITH LEFT ANTERIOR FASCICULAR BLOCK: ICD-10-CM

## 2022-08-16 DIAGNOSIS — R94.31 ABNORMAL EKG: Primary | ICD-10-CM

## 2022-08-16 DIAGNOSIS — I10 ESSENTIAL HYPERTENSION: ICD-10-CM

## 2022-08-16 PROCEDURE — 93000 ELECTROCARDIOGRAM COMPLETE: CPT | Performed by: NURSE PRACTITIONER

## 2022-08-16 PROCEDURE — 99214 OFFICE O/P EST MOD 30 MIN: CPT | Performed by: NURSE PRACTITIONER

## 2022-08-16 NOTE — PROGRESS NOTES
Date of Office Visit: 2022  Encounter Provider: JUAN MANUEL Mckinley  Place of Service: [facility] CARDIOLOGY  Patient Name: Mehnaz Denney  :1960  Primary Cardiologist: Dr. Xena Yee     Chief Complaint   Patient presents with   • Abnormal ECG   :     HPI: Mehnaz Denney is a pleasant 62 y.o. female who presents today for follow-up on abnormal EKG and septal asymmetric hypertrophy.  She is a new patient to me and I have reviewed her medical records.    She has been diagnosed with hypertension, hyperlipidemia, and iron deficiency anemia.    In 2021, she saw Dr. Xena Yee for perioperative cardiac risk assessment for upcoming hysterectomy.  Her preoperative EKG was abnormal showing an incomplete right bundle branch block and left anterior fascicular block.  She felt like the combination of lisinopril-HCTZ was causing her heart to race so the medication was discontinued and she was placed on amlodipine.  Echocardiogram was ordered.    In 2021, echocardiogram showed normal LVEF, grade 1 diastolic dysfunction, septal asymmetric hypertrophy, no LVOT obstruction, and mild anterior mitral leaflet thickening.  She is felt to be at low risk from a cardiac standpoint for moderate risk surgery.    She presents today for an annual follow-up visit.  She ended up having hysterectomy and did well with that.  She had the COVID-19 virus 2 weeks ago with symptoms of scratchy throat, fatigue, cough, and cough.  She continues to have some residual fatigue.  She typically exercises regularly.  She denies chest pain, shortness of air, palpitations, edema, dizziness, syncope, or bleeding.  Her blood pressure typically runs 117/77.      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 2021   • PONV (postoperative nausea and vomiting)    • Post-menopause bleeding    • Uterine fibroid        Past Surgical History:   Procedure Laterality Date   •  SECTION      TWINS   • CYSTOSCOPY W/ URETERAL STENT PLACEMENT Right 3/18/2021    Procedure: CYSTOSCOPY, RIGHT  URETERAL STENT INSERTION, AND RUSH CATHETER PLACEMENT;  Surgeon: Remy Bowens MD;  Location: [facility]  Service: Urology;  Laterality: Right;   • D & C WITH SUCTION     • D & C WITH SUCTION     • EXTRACORPOREAL SHOCKWAVE LITHOTRIPSY (ESWL), STENT INSERTION/REMOVAL Right 2021    Procedure: RIGHT EXTRACORPOREAL SHOCKWAVE LITHOTRIPSY;  Surgeon: Nito Felix MD;  Location: [facility]  Service: Urology;  Laterality: Right;   • LAPAROSCOPIC TUBAL LIGATION Bilateral    • TOTAL ABDOMINAL HYSTERECTOMY Bilateral 2021    Procedure: TOTAL ABDOMINAL HYSTERECTOMY and bilateral salpingoopherectomy;  Surgeon: Mabel Soto MD;  Location: [facility];  Service: Obstetrics/Gynecology;  Laterality: Bilateral;   • TOTAL ABDOMINAL HYSTERECTOMY WITH SALPINGO OOPHORECTOMY     • WISDOM TOOTH EXTRACTION         Social History     Socioeconomic History   • Marital status: Single   Tobacco Use   • Smoking status: Never Smoker   • Smokeless tobacco: Never Used   Vaping Use   • Vaping Use: Never used   Substance and Sexual Activity   • Alcohol use: Not Currently     Alcohol/week: 1.0 standard drink     Types: 1 Glasses of wine per week     Comment: Occ//Caffeine use: 2-3 cups daily   • Drug use: Never   • Sexual activity: Defer     Partners: Male     Birth control/protection: Tubal ligation       Family History   Adopted: Yes   Problem Relation Age of Onset   • Hypertension Mother    • Hypertension Father    • No Known Problems Daughter    • No Known Problems Daughter    • Malig Hyperthermia Neg  "Hx        The following portion of the patient's history were reviewed and updated as appropriate: past medical history, past surgical history, past social history, past family history, allergies, current medications, and problem list.    Review of Systems   Constitutional: Positive for malaise/fatigue.   Cardiovascular: Negative.    Respiratory: Negative.    Hematologic/Lymphatic: Negative.    Neurological: Negative.        No Known Allergies      Current Outpatient Medications:   •  amLODIPine (NORVASC) 5 MG tablet, Take 1 tablet by mouth Daily., Disp: 30 tablet, Rfl: 5  •  ferrous sulfate 325 (65 FE) MG tablet, Take 1 tablet by mouth Daily With Breakfast., Disp: 90 tablet, Rfl: 1  •  Multiple Vitamin (MULTI-VITAMIN DAILY PO), Take 1 tablet by mouth Daily., Disp: , Rfl:   •  rosuvastatin (CRESTOR) 10 MG tablet, TAKE ONE TABLET BY MOUTH DAILY, Disp: 90 tablet, Rfl: 3        Objective:     Vitals:    08/16/22 1256 08/16/22 1326   BP: 140/90 132/82   BP Location: Left arm Left arm   Patient Position: Sitting Sitting   Cuff Size: Adult    Pulse: 87    Weight: 69.4 kg (153 lb)    Height: 175.3 cm (69\")      Body mass index is 22.59 kg/m².    PHYSICAL EXAM:    Vitals Reviewed.   General Appearance: No acute distress, well developed and well nourished.  Thin.  Eyes: Conjunctiva and lids: No erythema, swelling, or discharge. Sclera non-icteric. Glasses.   HENT: Atraumatic, normocephalic. External eyes, ears, and nose normal. No hearing loss noted. Mucous membranes normal. Lips not cyanotic. Neck supple with no tenderness. Wearing mask.   Respiratory: No signs of respiratory distress. Respiration rhythm and depth normal.   Clear to auscultation. No rales, crackles, rhonchi, or wheezing auscultated.   Cardiovascular:  Jugular Venous Pressure: Normal  Heart Rate and Rhythm: Normal, Heart Sounds: Normal S1 and S2. No S3 or S4 noted.  Murmurs: No murmurs noted. No rubs, thrills, or gallops.   Lower Extremities: No edema " noted.  Gastrointestinal:  Abdomen soft, non-distended, non-tender.    Musculoskeletal: Normal movement of extremities.  Skin and Nails: General appearance normal. No pallor, cyanosis, diaphoresis. Skin temperature normal. No clubbing of fingernails.   Psychiatric: Patient alert and oriented to person, place, and time. Speech and behavior appropriate. Normal mood and affect.       ECG 12 Lead    Date/Time: 8/16/2022 1:01 PM  Performed by: Malathi Pelayo APRN  Authorized by: Malathi Pelayo APRN   Comparison: compared with previous ECG from 7/21/2021  Similar to previous ECG  Comparison to previous ECG: Sinus rhythm with left anterior fascicular block  Rhythm: sinus rhythm  Rate: normal  BPM: 87  Conduction: left anterior fascicular block  ST Segments: ST segments normal  T Waves: T waves normal  QRS axis: normal    Clinical impression: non-specific ECG              Assessment:       Diagnosis Plan   1. Abnormal EKG     2. Incomplete right bundle branch block (RBBB) with left anterior fascicular block     3. Asymmetric septal hypertrophy (HCC)     4. Essential hypertension     5. Hypercholesterolemia     6. COVID-19 virus detected            Plan:       1/2.  Abnormal EKG: January 2021, she was noted to have an abnormal preop EKG showing an incomplete right bundle branch block and left anterior fascicular block.  Today's EKG just shows the left anterior fascicular block.    3.  Asymmetric Septal Hypertrophy: Incidentally noted on echocardiogram in January 2021.  No LVOT obstruction.    4.  Hypertension: Blood pressure well controlled at home running 117/70s.    5.  Hypercholesterolemia: Remains on rosuvastatin followed by her PCP.    6.  Recent COVID-19 virus: Still has residual symptoms of fatigue.    7.  I recommended a 1 year follow-up visit with Dr. Xena Yee.    As always, it has been a pleasure to participate in your patient's care. Thank you.       Sincerely,         JUAN MANUEL Marte  [facility]      · Dictated utilizing Dragon Dictation  · COVID-19 Precautions - Patient was compliant in wearing a mask. When I saw the patient, I used appropriate personal protective equipment (PPE) including mask and eye shield (standard procedure).  Additionally, I used gown and gloves if indicated.  Hand hygiene was completed before and after seeing the patient.  · I spent 30 minutes reviewing her medical records/testing/previous office notes/labs, face-to-face interaction with patient, physical examination, formulating the plan of care, and discussion of plan of care with patient.

## 2022-10-23 ENCOUNTER — TELEMEDICINE (OUTPATIENT)
Dept: FAMILY MEDICINE CLINIC | Facility: TELEHEALTH | Age: 62
End: 2022-10-23

## 2022-10-23 DIAGNOSIS — L03.031 CELLULITIS OF TOE OF RIGHT FOOT: Primary | ICD-10-CM

## 2022-10-23 PROCEDURE — 99213 OFFICE O/P EST LOW 20 MIN: CPT | Performed by: NURSE PRACTITIONER

## 2022-10-23 RX ORDER — CEPHALEXIN 250 MG/5ML
500 POWDER, FOR SUSPENSION ORAL 3 TIMES DAILY
Qty: 300 ML | Refills: 0 | Status: SHIPPED | OUTPATIENT
Start: 2022-10-23 | End: 2022-11-02

## 2022-10-23 RX ORDER — CEPHALEXIN 500 MG/1
500 CAPSULE ORAL 3 TIMES DAILY
Qty: 30 CAPSULE | Refills: 0 | Status: SHIPPED | OUTPATIENT
Start: 2022-10-23 | End: 2022-10-23

## 2022-10-23 NOTE — PROGRESS NOTES
You have chosen to receive care through a telehealth visit.  Do you consent to use a video/audio connection for your medical care today? Yes     CHIEF COMPLAINT  No chief complaint on file.        HPI  Mehnaz Denney is a 62 y.o. female  presents with complaint of 2 day swollen great toe on right foot, was itching a couple of days ago and she scratched it, toe is sore to touch and slightly warm to touch.  She does not remember an injury, other than it was itching.  She has no history of gout.     Review of Systems   Musculoskeletal: Positive for joint swelling.   All other systems reviewed and are negative.      Past Medical History:   Diagnosis Date   • Asymmetric septal hypertrophy (HCC)    • COVID-19 virus detected 2022   • Grief     X-   2021   • History of anemia    • History of kidney stones    • Hyperlipidemia    • Hypertension    • Incomplete right bundle branch block (RBBB) with left anterior fascicular block 2021   • PONV (postoperative nausea and vomiting)    • Post-menopause bleeding    • Uterine fibroid        Family History   Adopted: Yes   Problem Relation Age of Onset   • Hypertension Mother    • Hypertension Father    • No Known Problems Daughter    • No Known Problems Daughter    • Malig Hyperthermia Neg Hx        Social History     Socioeconomic History   • Marital status: Single   Tobacco Use   • Smoking status: Never   • Smokeless tobacco: Never   Vaping Use   • Vaping Use: Never used   Substance and Sexual Activity   • Alcohol use: Not Currently     Alcohol/week: 1.0 standard drink     Types: 1 Glasses of wine per week     Comment: Occ//Caffeine use: 2-3 cups daily   • Drug use: Never   • Sexual activity: Defer     Partners: Male     Birth control/protection: Tubal ligation       Mehnaz Denney  reports that she has never smoked. She has never used smokeless tobacco..               LMP  (LMP Unknown)     PHYSICAL EXAM  Physical Exam   Constitutional: She is  oriented to person, place, and time. She appears well-developed and well-nourished. She does not have a sickly appearance. She does not appear ill.   HENT:   Head: Normocephalic and atraumatic.   Pulmonary/Chest: Effort normal.  No respiratory distress.  Musculoskeletal:      Comments: Right great toe is mildly swollen, slightly warm and tender to touch; no redness   Neurological: She is alert and oriented to person, place, and time.         Diagnoses and all orders for this visit:    1. Cellulitis of toe of right foot (Primary)  -     cephalexin (Keflex) 500 MG capsule; Take 1 capsule by mouth 3 (Three) Times a Day for 10 days.  Dispense: 30 capsule; Refill: 0    --take Keflex as prescribed  --tylenol or ibuprofen for pain  --f/u in 2-3 days if no improvement      FOLLOW-UP  As discussed during visit with PCP/[facility] Care if no improvement or Urgent Care/Emergency Department if worsening of symptoms    Patient verbalizes understanding of medication dosage, comfort measures, instructions for treatment and follow-up.    Apolonia Herrera, APRN  10/23/2022  13:35 EDT    The use of a video visit has been reviewed with the patient and verbal informed consent has been obtained. Myself and Mehnaz Denney participated in this visit. The patient is located in [address] [place] [address].    I am located in [place], [place]. Mychart and Zoom were utilized. I spent 20 minutes in the patient's chart for this visit.

## 2022-10-24 ENCOUNTER — TELEPHONE (OUTPATIENT)
Dept: OBSTETRICS AND GYNECOLOGY | Age: 62
End: 2022-10-24

## 2022-10-24 NOTE — TELEPHONE ENCOUNTER
Caller: CHRISTIAN FRANCES     Relationship: SELF     Best call back number: [phone]    Requested Prescriptions:   Requested Prescriptions      No prescriptions requested or ordered in this encounter      ferrous sulfate 325 (65 FE) MG tablet    Pharmacy where request should be sent:    [facility] [id number] [address] AT [facility] [phone] [facility] [phone]   Additional details provided by patient: PT HAS ONE PILL LEFT AND NEEDS A NEW PRESCRIPTION CALLED  IN     Does the patient have less than a 3 day supply:  [x] Yes  [] No    Arely Astudillo Rep   10/24/22 14:38 EDT

## 2022-10-25 RX ORDER — FERROUS SULFATE 325(65) MG
325 TABLET ORAL
Qty: 90 TABLET | Refills: 0 | Status: SHIPPED | OUTPATIENT
Start: 2022-10-25 | End: 2022-10-27 | Stop reason: SDUPTHER

## 2022-10-25 NOTE — TELEPHONE ENCOUNTER
Refills given to last until next appointment.   pt co abdominal pain x 2 hours. denies nvd. pt reports this has happened several times before and they are never able to find anything. pt reports vegetable rice for dinner. hx of htn.

## 2022-10-27 RX ORDER — FERROUS SULFATE 325(65) MG
325 TABLET ORAL
Qty: 90 TABLET | Refills: 0 | Status: SHIPPED | OUTPATIENT
Start: 2022-10-27 | End: 2023-03-24 | Stop reason: SDUPTHER

## 2023-01-05 RX ORDER — AMLODIPINE BESYLATE 5 MG/1
TABLET ORAL
Qty: 30 TABLET | Refills: 5 | Status: SHIPPED | OUTPATIENT
Start: 2023-01-05

## 2023-03-03 RX ORDER — ROSUVASTATIN CALCIUM 10 MG/1
TABLET, COATED ORAL
Qty: 90 TABLET | Refills: 3 | Status: SHIPPED | OUTPATIENT
Start: 2023-03-03

## 2023-03-24 RX ORDER — FERROUS SULFATE 325(65) MG
325 TABLET ORAL
Qty: 90 TABLET | Refills: 0 | Status: SHIPPED | OUTPATIENT
Start: 2023-03-24

## 2023-03-24 NOTE — TELEPHONE ENCOUNTER
Caller: Mehnaz Denney    Relationship: Self    Best call back number: [phone]    Requested Prescriptions:   Requested Prescriptions     Pending Prescriptions Disp Refills   • ferrous sulfate 325 (65 FE) MG tablet 90 tablet 0     Sig: Take 1 tablet by mouth Daily With Breakfast.        Pharmacy where request should be sent: [facility] [id number] [address] AT [facility] [phone] [facility] [phone] FX     Last office visit with prescribing clinician: 6/10/2022   Last telemedicine visit with prescribing clinician: 4/3/2023   Next office visit with prescribing clinician: 4/6/2023     Additional details provided by patient:2 DAYS LEFT    Does the patient have less than a 3 day supply:  [x] Yes  [] No    Would you like a call back once the refill request has been completed: [] Yes [x] No    If the office needs to give you a call back, can they leave a voicemail: [] Yes [x] No    Arely Eldridge Rep   03/24/23 11:59 EDT

## 2023-04-03 DIAGNOSIS — E78.00 HYPERCHOLESTEROLEMIA: Primary | ICD-10-CM

## 2023-04-03 DIAGNOSIS — I10 ESSENTIAL HYPERTENSION: ICD-10-CM

## 2023-04-03 DIAGNOSIS — E55.9 VITAMIN D DEFICIENCY: ICD-10-CM

## 2023-04-03 LAB
25(OH)D3+25(OH)D2 SERPL-MCNC: 37.9 NG/ML (ref 30–100)
ALBUMIN SERPL-MCNC: 4.6 G/DL (ref 3.5–5.2)
ALBUMIN/GLOB SERPL: 1.6 G/DL
ALP SERPL-CCNC: 117 U/L (ref 39–117)
ALT SERPL-CCNC: 12 U/L (ref 1–33)
AST SERPL-CCNC: 20 U/L (ref 1–32)
BASOPHILS # BLD AUTO: ABNORMAL 10*3/UL
BASOPHILS # BLD MANUAL: 0.03 10*3/MM3 (ref 0–0.2)
BASOPHILS NFR BLD MANUAL: 1 % (ref 0–1.5)
BILIRUB SERPL-MCNC: 0.5 MG/DL (ref 0–1.2)
BUN SERPL-MCNC: 13 MG/DL (ref 8–23)
BUN/CREAT SERPL: 17.8 (ref 7–25)
CALCIUM SERPL-MCNC: 11.1 MG/DL (ref 8.6–10.5)
CHLORIDE SERPL-SCNC: 106 MMOL/L (ref 98–107)
CHOLEST SERPL-MCNC: 161 MG/DL (ref 0–200)
CHOLEST/HDLC SERPL: 2.3 {RATIO}
CO2 SERPL-SCNC: 26.6 MMOL/L (ref 22–29)
CREAT SERPL-MCNC: 0.73 MG/DL (ref 0.57–1)
DIFFERENTIAL COMMENT: ABNORMAL
EGFRCR SERPLBLD CKD-EPI 2021: 93.7 ML/MIN/1.73
EOSINOPHIL # BLD AUTO: ABNORMAL 10*3/UL
EOSINOPHIL # BLD MANUAL: 0.06 10*3/MM3 (ref 0–0.4)
EOSINOPHIL NFR BLD AUTO: ABNORMAL %
EOSINOPHIL NFR BLD MANUAL: 2 % (ref 0.3–6.2)
ERYTHROCYTE [DISTWIDTH] IN BLOOD BY AUTOMATED COUNT: 14.6 % (ref 12.3–15.4)
GLOBULIN SER CALC-MCNC: 2.8 GM/DL
GLUCOSE SERPL-MCNC: 88 MG/DL (ref 65–99)
HCT VFR BLD AUTO: 40.7 % (ref 34–46.6)
HDLC SERPL-MCNC: 70 MG/DL (ref 40–60)
HGB BLD-MCNC: 13.2 G/DL (ref 12–15.9)
LDLC SERPL CALC-MCNC: 81 MG/DL (ref 0–100)
LYMPHOCYTES # BLD AUTO: ABNORMAL 10*3/UL
LYMPHOCYTES # BLD MANUAL: 1.01 10*3/MM3 (ref 0.7–3.1)
LYMPHOCYTES NFR BLD AUTO: ABNORMAL %
LYMPHOCYTES NFR BLD MANUAL: 34.7 % (ref 19.6–45.3)
MCH RBC QN AUTO: 24.2 PG (ref 26.6–33)
MCHC RBC AUTO-ENTMCNC: 32.4 G/DL (ref 31.5–35.7)
MCV RBC AUTO: 74.7 FL (ref 79–97)
MONOCYTES # BLD MANUAL: 0.33 10*3/MM3 (ref 0.1–0.9)
MONOCYTES NFR BLD AUTO: ABNORMAL %
MONOCYTES NFR BLD MANUAL: 11.2 % (ref 5–12)
NEUTROPHILS # BLD MANUAL: 1.49 10*3/MM3 (ref 1.7–7)
NEUTROPHILS NFR BLD AUTO: ABNORMAL %
NEUTROPHILS NFR BLD MANUAL: 51 % (ref 42.7–76)
PLATELET # BLD AUTO: 157 10*3/MM3 (ref 140–450)
PLATELET BLD QL SMEAR: ABNORMAL
POTASSIUM SERPL-SCNC: 4.3 MMOL/L (ref 3.5–5.2)
PROT SERPL-MCNC: 7.4 G/DL (ref 6–8.5)
RBC # BLD AUTO: 5.45 10*6/MM3 (ref 3.77–5.28)
RBC MORPH BLD: ABNORMAL
SODIUM SERPL-SCNC: 142 MMOL/L (ref 136–145)
TRIGL SERPL-MCNC: 45 MG/DL (ref 0–150)
UNABLE TO VOID: NORMAL
VLDLC SERPL CALC-MCNC: 10 MG/DL (ref 5–40)
WBC # BLD AUTO: 2.92 10*3/MM3 (ref 3.4–10.8)

## 2023-04-06 ENCOUNTER — OFFICE VISIT (OUTPATIENT)
Dept: FAMILY MEDICINE CLINIC | Facility: CLINIC | Age: 63
End: 2023-04-06
Payer: COMMERCIAL

## 2023-04-06 VITALS
DIASTOLIC BLOOD PRESSURE: 62 MMHG | BODY MASS INDEX: 23.7 KG/M2 | SYSTOLIC BLOOD PRESSURE: 110 MMHG | HEIGHT: 69 IN | HEART RATE: 99 BPM | TEMPERATURE: 98.4 F | WEIGHT: 160 LBS | OXYGEN SATURATION: 99 %

## 2023-04-06 DIAGNOSIS — Z23 IMMUNIZATION DUE: ICD-10-CM

## 2023-04-06 DIAGNOSIS — D72.819 LEUKOPENIA, UNSPECIFIED TYPE: ICD-10-CM

## 2023-04-06 DIAGNOSIS — I42.2 ASYMMETRIC SEPTAL HYPERTROPHY: ICD-10-CM

## 2023-04-06 DIAGNOSIS — E78.00 HYPERCHOLESTEROLEMIA: ICD-10-CM

## 2023-04-06 DIAGNOSIS — I10 ESSENTIAL HYPERTENSION: Primary | ICD-10-CM

## 2023-04-06 NOTE — PROGRESS NOTES
Subjective   Mehnaz Denney is a 62 y.o. female. Patient is here today for   Chief Complaint   Patient presents with   • Annual Exam          Vitals:    23 1255   BP: 110/62   Pulse: 99   Temp: 98.4 °F (36.9 °C)   SpO2: 99%     Body mass index is 23.63 kg/m².    The following portions of the patient's history were reviewed and updated as appropriate: allergies, current medications, past family history, past medical history, past social history, past surgical history and problem list.    Past Medical History:   Diagnosis Date   • Asymmetric septal hypertrophy    • COVID-19 virus detected 2022   • Grief     X-   2021   • History of anemia    • History of kidney stones    • Hyperlipidemia    • Hypertension    • Incomplete right bundle branch block (RBBB) with left anterior fascicular block 2021   • PONV (postoperative nausea and vomiting)    • Post-menopause bleeding    • Uterine fibroid       No Known Allergies   Social History     Socioeconomic History   • Marital status: Single   Tobacco Use   • Smoking status: Never   • Smokeless tobacco: Never   Vaping Use   • Vaping Use: Never used   Substance and Sexual Activity   • Alcohol use: Not Currently     Alcohol/week: 1.0 standard drink     Types: 1 Glasses of wine per week     Comment: Occ//Caffeine use: 2-3 cups daily   • Drug use: Never   • Sexual activity: Defer     Partners: Male     Birth control/protection: Tubal ligation        Current Outpatient Medications:   •  amLODIPine (NORVASC) 5 MG tablet, TAKE ONE TABLET BY MOUTH DAILY, Disp: 30 tablet, Rfl: 5  •  ferrous sulfate 325 (65 FE) MG tablet, Take 1 tablet by mouth Daily With Breakfast., Disp: 90 tablet, Rfl: 0  •  Multiple Vitamin (MULTI-VITAMIN DAILY PO), Take 1 tablet by mouth Daily., Disp: , Rfl:   •  rosuvastatin (CRESTOR) 10 MG tablet, TAKE ONE TABLET BY MOUTH DAILY, Disp: 90 tablet, Rfl: 3     EKG Procedures    ECG Report    Objective   History of Present  Illness  She is here for a an annual physical exam.    She feels well.  She follows up with Dr. Yee of cardiology for a history of Septal asymmetric hypertrophy, left ventricular diastolic function with grade 1 impaired relaxation, mild anterior mitral leaflet thickening.     Mehnaz Denney 62 y.o. female who presents for an Annual physical exam.   Labs results discussed in detail with the patient.  Plan to update vaccines if needed today.    Health Habits:  Dental Exam. up to date  Eye Exam. up to date  Exercise: 5 times/week.  Current exercise activities include: walking    Preventative counseling  Discussed face to face the importance of healthy diet and exercise.     Lab Results (most recent)     None            Review of Systems   Constitutional: Negative.    HENT: Negative.    Eyes: Negative.    Respiratory: Negative.    Cardiovascular: Negative.    Endocrine: Negative.    Genitourinary: Negative.    Musculoskeletal: Negative.    Skin: Negative.    Allergic/Immunologic: Negative.    Neurological: Negative.    Hematological: Negative.    Psychiatric/Behavioral: Negative.        Physical Exam  Vitals and nursing note reviewed.   Constitutional:       General: She is not in acute distress.     Appearance: Normal appearance. She is normal weight. She is not ill-appearing, toxic-appearing or diaphoretic.      Comments: Pleasant, neatly groomed, no distress.  She is very fit appearance.  BMI 23.   HENT:      Head: Normocephalic and atraumatic.      Right Ear: Tympanic membrane, ear canal and external ear normal. There is no impacted cerumen.      Left Ear: Tympanic membrane, ear canal and external ear normal. There is no impacted cerumen.      Nose: Nose normal.      Mouth/Throat:      Mouth: Mucous membranes are moist.   Eyes:      General: No scleral icterus.        Right eye: No discharge.         Left eye: No discharge.      Extraocular Movements: Extraocular movements intact.   Neck:      Vascular: No  carotid bruit.   Cardiovascular:      Rate and Rhythm: Normal rate and regular rhythm.      Pulses: Normal pulses.      Heart sounds: Normal heart sounds. No murmur heard.    No friction rub. No gallop.   Pulmonary:      Effort: Pulmonary effort is normal. No respiratory distress.      Breath sounds: Normal breath sounds. No stridor. No wheezing, rhonchi or rales.   Chest:      Chest wall: No tenderness.   Abdominal:      General: Abdomen is flat. Bowel sounds are normal. There is no distension.      Palpations: Abdomen is soft. There is no mass.      Tenderness: There is no abdominal tenderness. There is no right CVA tenderness, left CVA tenderness, guarding or rebound.      Hernia: No hernia is present.   Genitourinary:     Comments: Deferred to gynecology.  Musculoskeletal:         General: No swelling, tenderness, deformity or signs of injury. Normal range of motion.      Cervical back: Normal range of motion. No rigidity or tenderness.      Right lower leg: No edema.      Left lower leg: No edema.   Lymphadenopathy:      Cervical: No cervical adenopathy.   Skin:     General: Skin is warm and dry.      Capillary Refill: Capillary refill takes less than 2 seconds.      Coloration: Skin is not jaundiced or pale.      Findings: No bruising, erythema, lesion or rash.   Neurological:      General: No focal deficit present.      Mental Status: She is alert and oriented to person, place, and time.      Cranial Nerves: No cranial nerve deficit.      Sensory: No sensory deficit.      Motor: No weakness.      Coordination: Coordination normal.      Gait: Gait normal.      Deep Tendon Reflexes: Reflexes normal.   Psychiatric:         Mood and Affect: Mood normal.         Behavior: Behavior normal.         Thought Content: Thought content normal.         ASSESSMENT       Problems Addressed this Visit        Cardiac and Vasculature    Essential hypertension - Primary    Hypercholesterolemia    Asymmetric septal hypertrophy        Hematology and Neoplasia    Leukopenia   Diagnoses       Codes Comments    Essential hypertension    -  Primary ICD-10-CM: I10  ICD-9-CM: 401.9     Asymmetric septal hypertrophy     ICD-10-CM: I42.2  ICD-9-CM: 425.18     Hypercholesterolemia     ICD-10-CM: E78.00  ICD-9-CM: 272.0     Leukopenia, unspecified type     ICD-10-CM: D72.819  ICD-9-CM: 288.50           PLAN  She and I reviewed her labs.  Her white blood cell count is a little low at 2.92.  10 months ago it was 3.1.  In November 2021 her white blood cell count was 7.49.  In July 2021 it was 2.6.    I have referred her to hematology to get their opinion about her variable leukopenia.    She has a history of asymmetrical septal hypertrophy.  She follows up with Dr. Grimaldo for this.    She has hypercholesterolemia with excellent control on rosuvastatin 10 mg daily.    Her hypertension is well controlled.    I asked her to follow-up with me in about 6 months.  Fasting labs prior that visit should include: Lipid profile, comprehensive metabolic panel, CBC, urinalysis.    I will add on a iron, total iron-binding capacity and a vitamin D level to labs done 3 days ago.    We gave her Tdap today.  There are no Patient Instructions on file for this visit.    No follow-ups on file.  Patient was given instructions and counseling regarding her  condition for health maintenance advice.  Please see specific information pulled into the AVS if appropriate.

## 2023-07-24 NOTE — TELEPHONE ENCOUNTER
Caller: Mehnaz Denney    Relationship: Self    Best call back number: [phone]*    Requested Prescriptions:   Requested Prescriptions     Pending Prescriptions Disp Refills    ferrous sulfate 325 (65 FE) MG tablet 90 tablet 0     Sig: Take 1 tablet by mouth Daily With Breakfast.        Pharmacy where request should be sent: [facility] [id number] [address] AT [facility] [phone] [facility] [phone] FX     Last office visit with prescribing clinician: 4/6/2023   Last telemedicine visit with prescribing clinician: Visit date not found   Next office visit with prescribing clinician: 10/9/2023     Additional details provided by patient: PATIENT OUT OF MEDICATION    Does the patient have less than a 3 day supply:  [x] Yes  [] No    Would you like a call back once the refill request has been completed: [x] Yes [] No    If the office needs to give you a call back, can they leave a voicemail: [x] Yes [] No    Godfrey Lux   07/24/23 13:18 EDT

## 2023-07-25 RX ORDER — FERROUS SULFATE 325(65) MG
325 TABLET ORAL
Qty: 90 TABLET | Refills: 0 | Status: SHIPPED | OUTPATIENT
Start: 2023-07-25

## 2023-08-17 ENCOUNTER — OFFICE VISIT (OUTPATIENT)
Dept: CARDIOLOGY | Facility: CLINIC | Age: 63
End: 2023-08-17
Payer: COMMERCIAL

## 2023-08-17 VITALS
SYSTOLIC BLOOD PRESSURE: 140 MMHG | BODY MASS INDEX: 24.59 KG/M2 | WEIGHT: 166 LBS | DIASTOLIC BLOOD PRESSURE: 86 MMHG | HEIGHT: 69 IN | HEART RATE: 96 BPM

## 2023-08-17 DIAGNOSIS — I42.2 ASYMMETRIC SEPTAL HYPERTROPHY: ICD-10-CM

## 2023-08-17 DIAGNOSIS — I10 ESSENTIAL HYPERTENSION: Primary | ICD-10-CM

## 2023-08-17 DIAGNOSIS — I45.2 INCOMPLETE RIGHT BUNDLE BRANCH BLOCK (RBBB) WITH LEFT ANTERIOR FASCICULAR BLOCK: ICD-10-CM

## 2023-08-17 DIAGNOSIS — E78.00 HYPERCHOLESTEROLEMIA: ICD-10-CM

## 2023-08-17 PROCEDURE — 99214 OFFICE O/P EST MOD 30 MIN: CPT | Performed by: INTERNAL MEDICINE

## 2023-08-17 PROCEDURE — 93000 ELECTROCARDIOGRAM COMPLETE: CPT | Performed by: INTERNAL MEDICINE

## 2023-08-17 NOTE — PROGRESS NOTES
Date of Office Visit: 2023  Encounter Provider: Xena Yee MD  Place of Service: [facility] CARDIOLOGY  Patient Name: Mehnaz Denney  :1960      Patient ID:  Mehnaz Denney is a 63 y.o. female is here for  followup for hypertension.        History of Present Illness    She has a history of hypertension, iRBBB and LAFB, hyperlipidemia, history of anemia on oral iron, s/p hysterectomy for leiomyomas and dysmenorrhea.       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  Her mother and father both had hypertension.     Labs on 4/3/2023 show normal CMP, total cholesterol 161, HDL 70, LDL 81, triglycerides 45, VLDL 10, unremarkable CBC.    Echocardiogram done 2021 showed ejection fraction of 67% with septal asymmetric hypertrophy, grade 1 diastolic dysfunction, anterior mitral leaflet thickening without stenosis or insufficiency.    She checks her blood pressure at home and it runs 117/76.  She exercises almost daily walking for 20 minutes and then doing light weight training.  She has had no dizziness, syncope.  She has no exertional chest tightness or pressure.  She has no orthopnea or PND.  She has no exertional dyspnea.  Her energy level is good.  She is taking her medications as directed without difficulty.    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    PONV (postoperative nausea and vomiting)     Post-menopause bleeding     Uterine fibroid          Past Surgical History:   Procedure Laterality Date     SECTION      TWINS    CYSTOSCOPY W/  URETERAL STENT PLACEMENT Right 3/18/2021    Procedure: CYSTOSCOPY, RIGHT  URETERAL STENT INSERTION, AND RUSH CATHETER PLACEMENT;  Surgeon: Remy Bowens MD;  Location: [facility];  Service: Urology;  Laterality: Right;    D & C WITH SUCTION  1996    D & C WITH SUCTION  1987    EXTRACORPOREAL SHOCKWAVE LITHOTRIPSY (ESWL), STENT INSERTION/REMOVAL Right 4/12/2021    Procedure: RIGHT EXTRACORPOREAL SHOCKWAVE LITHOTRIPSY;  Surgeon: Nito Felix MD;  Location: [facility] OR;  Service: Urology;  Laterality: Right;    LAPAROSCOPIC TUBAL LIGATION Bilateral 1997    TOTAL ABDOMINAL HYSTERECTOMY Bilateral 11/12/2021    Procedure: TOTAL ABDOMINAL HYSTERECTOMY and bilateral salpingoopherectomy;  Surgeon: Mabel Soto MD;  Location: [facility];  Service: Obstetrics/Gynecology;  Laterality: Bilateral;    TOTAL ABDOMINAL HYSTERECTOMY WITH SALPINGO OOPHORECTOMY      WISDOM TOOTH EXTRACTION         Current Outpatient Medications on File Prior to Visit   Medication Sig Dispense Refill    amLODIPine (NORVASC) 5 MG tablet TAKE ONE TABLET BY MOUTH DAILY 30 tablet 5    ferrous sulfate 325 (65 FE) MG tablet Take 1 tablet by mouth Daily With Breakfast. 90 tablet 0    Multiple Vitamin (MULTI-VITAMIN DAILY PO) Take 1 tablet by mouth Daily.      rosuvastatin (CRESTOR) 10 MG tablet TAKE ONE TABLET BY MOUTH DAILY 90 tablet 3     No current facility-administered medications on file prior to visit.       Social History     Socioeconomic History    Marital status: Single   Tobacco Use    Smoking status: Never     Passive exposure: Never    Smokeless tobacco: Never   Vaping Use    Vaping Use: Never used   Substance and Sexual Activity    Alcohol use: Not Currently     Alcohol/week: 1.0 standard drink     Types: 1 Glasses of wine per week     Comment: Occ//Caffeine use: 2-3 cups daily    Drug use: Never    Sexual activity: Defer     Partners: Male     Birth control/protection: Tubal ligation           ROS    Procedures    ECG 12  "Lead    Date/Time: 8/17/2023 1:30 PM  Performed by: Xena Yee MD  Authorized by: Xena Yee MD   Comparison: compared with previous ECG   Similar to previous ECG  Rhythm: sinus rhythm  Conduction: left anterior fascicular block    Clinical impression: abnormal EKG            Objective:      Vitals:    08/17/23 1312   BP: 140/86   Pulse: 96   Weight: 75.3 kg (166 lb)   Height: 175.3 cm (69\")     Body mass index is 24.51 kg/mý.    Vitals reviewed.   Constitutional:       General: Not in acute distress.     Appearance: Well-developed. Not diaphoretic.   Eyes:      General: No scleral icterus.     Conjunctiva/sclera: Conjunctivae normal.   HENT:      Head: Normocephalic and atraumatic.   Neck:      Thyroid: No thyromegaly.      Vascular: No carotid bruit or JVD.      Lymphadenopathy: No cervical adenopathy.   Pulmonary:      Effort: Pulmonary effort is normal. No respiratory distress.      Breath sounds: Normal breath sounds. No wheezing. No rhonchi. No rales.   Chest:      Chest wall: Not tender to palpatation.   Cardiovascular:      Normal rate. Regular rhythm.      Murmurs: There is no murmur.      No gallop.  No rub.   Pulses:     Intact distal pulses.      Carotid: 2+ bilaterally.     Radial: 2+ bilaterally.     Dorsalis pedis: 2+ bilaterally.     Posterior tibial: 2+ bilaterally.  Edema:     Peripheral edema absent.   Abdominal:      General: Bowel sounds are normal. There is no distension or abdominal bruit.      Palpations: Abdomen is soft. There is no abdominal mass.      Tenderness: There is no abdominal tenderness.   Musculoskeletal:         General: No deformity.      Extremities: No clubbing present.     Cervical back: Neck supple. Skin:     General: Skin is warm and dry. There is no cyanosis.      Coloration: Skin is not pale.      Findings: No rash.   Neurological:      Mental Status: Alert and oriented to person, place, and time.      Cranial Nerves: No cranial nerve deficit. "   Psychiatric:         Judgment: Judgment normal.       Lab Review:       Assessment:      Diagnosis Plan   1. Essential hypertension        2. Hypercholesterolemia        3. Incomplete right bundle branch block (RBBB) with left anterior fascicular block        4. Asymmetric septal hypertrophy          Abnormal EKG showing left anterior fascicular block and incomplete right bundle branch block.    S/p  hysterectomy indicated for leiomyomas and dysmenorrhea.  Hypertension, goal less than 120/80.  Continue amlodipine 5 mg daily.  Hyperlipidemia, continue rosuvastatin 10 mg nightly.  Iron deficiency anemia.  Resolved.     Plan:       See back in 2 years, no medication changes, advised continued exercise, overall doing well, no other testing at this time.

## 2023-12-08 NOTE — TELEPHONE ENCOUNTER
Caller: Mehnaz Denney    Relationship: Self    Best call back number: [phone]      Requested Prescriptions:   Requested Prescriptions     Pending Prescriptions Disp Refills    ferrous sulfate 325 (65 FE) MG tablet 90 tablet 0     Sig: Take 1 tablet by mouth Daily With Breakfast.        Pharmacy where request should be sent: [facility] [id number] [address] AT [facility] [phone] [facility] [phone] FX     Last office visit with prescribing clinician: 4/6/2023   Last telemedicine visit with prescribing clinician: Visit date not found   Next office visit with prescribing clinician: 1/5/2024     Additional details provided by patient:     Does the patient have less than a 3 day supply:  [x] Yes  [] No    Would you like a call back once the refill request has been completed: [] Yes [] No    If the office needs to give you a call back, can they leave a voicemail: [] Yes [] No    PLEASE ADVISE.    Arely Bermudez Rep   12/08/23 09:16 EST

## 2023-12-12 RX ORDER — FERROUS SULFATE 325(65) MG
325 TABLET ORAL
Qty: 90 TABLET | Refills: 0 | Status: SHIPPED | OUTPATIENT
Start: 2023-12-12

## 2024-01-03 RX ORDER — AMLODIPINE BESYLATE 5 MG/1
5 TABLET ORAL DAILY
Qty: 14 TABLET | Refills: 0 | Status: SHIPPED | OUTPATIENT
Start: 2024-01-03 | End: 2024-01-05 | Stop reason: SDUPTHER

## 2024-01-05 ENCOUNTER — OFFICE VISIT (OUTPATIENT)
Dept: FAMILY MEDICINE CLINIC | Facility: CLINIC | Age: 64
End: 2024-01-05
Payer: COMMERCIAL

## 2024-01-05 VITALS
DIASTOLIC BLOOD PRESSURE: 94 MMHG | HEIGHT: 69 IN | TEMPERATURE: 98 F | SYSTOLIC BLOOD PRESSURE: 150 MMHG | BODY MASS INDEX: 25.09 KG/M2 | HEART RATE: 107 BPM | OXYGEN SATURATION: 98 % | WEIGHT: 169.4 LBS

## 2024-01-05 DIAGNOSIS — Z12.11 COLON CANCER SCREENING: ICD-10-CM

## 2024-01-05 DIAGNOSIS — E61.1 IRON DEFICIENCY: ICD-10-CM

## 2024-01-05 DIAGNOSIS — E83.52 HYPERCALCEMIA: ICD-10-CM

## 2024-01-05 DIAGNOSIS — E78.00 HYPERCHOLESTEROLEMIA: Primary | ICD-10-CM

## 2024-01-05 DIAGNOSIS — Z12.31 ENCOUNTER FOR SCREENING MAMMOGRAM FOR MALIGNANT NEOPLASM OF BREAST: ICD-10-CM

## 2024-01-05 DIAGNOSIS — I10 ESSENTIAL HYPERTENSION: ICD-10-CM

## 2024-01-05 RX ORDER — AMLODIPINE BESYLATE 5 MG/1
5 TABLET ORAL DAILY
Qty: 30 TABLET | Refills: 3 | Status: SHIPPED | OUTPATIENT
Start: 2024-01-05

## 2024-01-05 NOTE — TELEPHONE ENCOUNTER
Caller: Mehnaz Denney    Relationship: Self    Best call back number: [phone]    Requested Prescriptions:   Requested Prescriptions     Pending Prescriptions Disp Refills    amLODIPine (NORVASC) 5 MG tablet 14 tablet 0     Sig: Take 1 tablet by mouth Daily. *PAST DUE FOR APPT*        Pharmacy where request should be sent: [facility] [id number] [address] AT [facility] [phone] [facility] [phone] FX     Last office visit with prescribing clinician: 1/5/2024   Last telemedicine visit with prescribing clinician: Visit date not found   Next office visit with prescribing clinician: 7/29/2024     Additional details provided by patient: JUST SAW DR. NOGUEIRA TODAY    Does the patient have less than a 3 day supply:  [] Yes  [x] No    Would you like a call back once the refill request has been completed: [] Yes [x] No    If the office needs to give you a call back, can they leave a voicemail: [] Yes [x] No    Arely Waite Rep   01/05/24 12:38 EST

## 2024-01-05 NOTE — PROGRESS NOTES
Subjective   Mehnaz Denney is a 63 y.o. female. Patient is here today for   Chief Complaint   Patient presents with    Follow-up          Vitals:    24 1001   BP: 150/94   Pulse: 107   Temp: 98 °F (36.7 °C)   SpO2: 98%     Body mass index is 25 kg/m².      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    PONV (postoperative nausea and vomiting)     Post-menopause bleeding     Uterine fibroid       No Known Allergies   Social History     Socioeconomic History    Marital status: Single   Tobacco Use    Smoking status: Never     Passive exposure: Never    Smokeless tobacco: Never   Vaping Use    Vaping Use: Never used   Substance and Sexual Activity    Alcohol use: Not Currently     Alcohol/week: 1.0 standard drink of alcohol     Types: 1 Glasses of wine per week     Comment: Occ//Caffeine use: 2-3 cups daily    Drug use: Never    Sexual activity: Defer     Partners: Male     Birth control/protection: Tubal ligation        Current Outpatient Medications:     ferrous sulfate 325 (65 FE) MG tablet, Take 1 tablet by mouth Daily With Breakfast., Disp: 90 tablet, Rfl: 0    Multiple Vitamin (MULTI-VITAMIN DAILY PO), Take 1 tablet by mouth Daily., Disp: , Rfl:     rosuvastatin (CRESTOR) 10 MG tablet, TAKE ONE TABLET BY MOUTH DAILY, Disp: 90 tablet, Rfl: 3    amLODIPine (NORVASC) 5 MG tablet, Take 1 tablet by mouth Daily. *PAST DUE FOR APPT*, Disp: 30 tablet, Rfl: 3     Objective     History of Present Illness  This youthful 63-year-old woman is here to follow-up on hypertension.    She feels well.       Review of Systems   Constitutional: Negative.    HENT: Negative.     Respiratory: Negative.     Cardiovascular: Negative.     Musculoskeletal: Negative.    Psychiatric/Behavioral: Negative.         Physical Exam  Vitals and nursing note reviewed.   Constitutional:       Appearance: Normal appearance.      Comments: Pleasant, neatly groomed, no distress.   Neck:      Vascular: No carotid bruit.   Cardiovascular:      Rate and Rhythm: Regular rhythm.      Heart sounds: Normal heart sounds. No murmur heard.     No gallop.   Pulmonary:      Effort: No respiratory distress.      Breath sounds: Normal breath sounds. No wheezing or rales.   Neurological:      Mental Status: She is alert and oriented to person, place, and time.   Psychiatric:         Mood and Affect: Mood normal.         Behavior: Behavior normal.         Thought Content: Thought content normal.         Problems Addressed this Visit          Cardiac and Vasculature    Essential hypertension    Hypercholesterolemia - Primary       Genitourinary and Reproductive     Hypercalcemia     Diagnoses         Codes Comments    Hypercholesterolemia    -  Primary ICD-10-CM: E78.00  ICD-9-CM: 272.0     Essential hypertension     ICD-10-CM: I10  ICD-9-CM: 401.9     Hypercalcemia     ICD-10-CM: E83.52  ICD-9-CM: 275.42               PLAN  When I checked her blood pressure in her right arm she was seated I got 116/72.  Her hypertension is well-controlled.    When her labs were checked in April her calcium level was a bit high.  She does take supplemental calcium.    She has a history of iron deficiency.    I am going to arrange for her to come back and get labs: CBC without differential, comprehensive metabolic panel, lipid profile, urinalysis, iron total antibiotic visit, TSH, Ionized calcium, and put an order in for Cologuard and mammogram.    Would like her to follow-up to see me in about 6 months.  No follow-ups on file.

## 2024-01-09 ENCOUNTER — TRANSCRIBE ORDERS (OUTPATIENT)
Dept: ADMINISTRATIVE | Facility: HOSPITAL | Age: 64
End: 2024-01-09
Payer: COMMERCIAL

## 2024-01-09 DIAGNOSIS — Z12.39 SCREENING BREAST EXAMINATION: Primary | ICD-10-CM

## 2024-01-16 ENCOUNTER — TELEPHONE (OUTPATIENT)
Dept: FAMILY MEDICINE CLINIC | Facility: CLINIC | Age: 64
End: 2024-01-16
Payer: COMMERCIAL

## 2024-01-16 NOTE — TELEPHONE ENCOUNTER
Caller: Mehnaz Denney    Relationship: Self    Best call back number: [phone]     What is the best time to reach you: ASAP    Who are you requesting to speak with (clinical staff, provider,  specific staff member):       What was the call regarding: PATIENT IS TRYING TO RESCHEDULE HER LABS. PLEASE CALL PATIENT BACK.    THANK YOU.

## 2024-01-18 ENCOUNTER — TELEMEDICINE (OUTPATIENT)
Dept: FAMILY MEDICINE CLINIC | Facility: TELEHEALTH | Age: 64
End: 2024-01-18
Payer: COMMERCIAL

## 2024-01-18 DIAGNOSIS — J20.9 ACUTE BRONCHITIS, UNSPECIFIED ORGANISM: Primary | ICD-10-CM

## 2024-01-18 RX ORDER — BROMPHENIRAMINE MALEATE, PSEUDOEPHEDRINE HYDROCHLORIDE, AND DEXTROMETHORPHAN HYDROBROMIDE 2; 30; 10 MG/5ML; MG/5ML; MG/5ML
5 SYRUP ORAL 4 TIMES DAILY PRN
Qty: 118 ML | Refills: 0 | Status: SHIPPED | OUTPATIENT
Start: 2024-01-18 | End: 2024-01-28

## 2024-01-18 RX ORDER — PREDNISONE 10 MG/1
10 TABLET ORAL DAILY
Qty: 5 TABLET | Refills: 0 | Status: SHIPPED | OUTPATIENT
Start: 2024-01-18 | End: 2024-01-23

## 2024-01-18 RX ORDER — BENZONATATE 100 MG/1
100 CAPSULE ORAL 3 TIMES DAILY PRN
Qty: 30 CAPSULE | Refills: 0 | Status: SHIPPED | OUTPATIENT
Start: 2024-01-18

## 2024-01-18 NOTE — PROGRESS NOTES
Chief Complaint   Patient presents with    Cough       Video Visit Reason:   Free Text Description: Cough  Subjective   Mehnaz Denney is a 63 y.o. female.     History of Present Illness  Cough and congestion over the last 3 days with increasing frequency.    Cough  This is a new problem. Episode onset: 2-3 days. The problem occurs every few minutes. The cough is Productive of clear sputum. Associated symptoms include postnasal drip. Pertinent negatives include no chest pain, chills, ear congestion, ear pain, fever, headaches, myalgias, nasal congestion, sore throat, shortness of breath or wheezing. She has tried OTC cough suppressant for the symptoms. The treatment provided no relief.       The following portions of the patient's history were reviewed and updated as appropriate: allergies, current medications, past medical history, and problem list.      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    PONV (postoperative nausea and vomiting)     Post-menopause bleeding     Uterine fibroid      Social History     Socioeconomic History    Marital status: Single   Tobacco Use    Smoking status: Never     Passive exposure: Never    Smokeless tobacco: Never   Vaping Use    Vaping Use: Never used   Substance and Sexual Activity    Alcohol use: Not Currently     Alcohol/week: 1.0 standard drink of alcohol     Types: 1 Glasses of wine per week     Comment: Occ//Caffeine use: 2-3 cups daily    Drug use: Never    Sexual activity: Defer     Partners: Male     Birth control/protection: Tubal ligation     medication documentation: reviewed and updated with patient and   Current Outpatient Medications:     amLODIPine (NORVASC) 5 MG tablet, Take  1 tablet by mouth Daily. *PAST DUE FOR APPT*, Disp: 30 tablet, Rfl: 3    ferrous sulfate 325 (65 FE) MG tablet, Take 1 tablet by mouth Daily With Breakfast., Disp: 90 tablet, Rfl: 0    Multiple Vitamin (MULTI-VITAMIN DAILY PO), Take 1 tablet by mouth Daily., Disp: , Rfl:     rosuvastatin (CRESTOR) 10 MG tablet, TAKE ONE TABLET BY MOUTH DAILY, Disp: 90 tablet, Rfl: 3    benzonatate (Tessalon Perles) 100 MG capsule, Take 1 capsule by mouth 3 (Three) Times a Day As Needed for Cough., Disp: 30 capsule, Rfl: 0    predniSONE (DELTASONE) 10 MG tablet, Take 1 tablet by mouth Daily for 5 days., Disp: 5 tablet, Rfl: 0  Review of Systems   Constitutional:  Negative for chills and fever.   HENT:  Positive for congestion and postnasal drip. Negative for ear pain, sinus pressure, sinus pain, sore throat, trouble swallowing and voice change.    Respiratory:  Positive for cough and chest tightness. Negative for shortness of breath and wheezing.    Cardiovascular:  Negative for chest pain.   Musculoskeletal:  Negative for myalgias.   Neurological:  Negative for headaches.       Objective   Physical Exam  Constitutional:       General: She is not in acute distress.     Appearance: She is not ill-appearing.   HENT:      Mouth/Throat:      Pharynx: Posterior oropharyngeal erythema present. No oropharyngeal exudate.   Eyes:      Conjunctiva/sclera: Conjunctivae normal.   Pulmonary:      Effort: Pulmonary effort is normal.      Comments: Cough, frequent, hoarseness  Neurological:      Mental Status: She is alert.   Psychiatric:         Speech: Speech normal.         Assessment & Plan   Diagnoses and all orders for this visit:    1. Acute bronchitis, unspecified organism (Primary)  -     benzonatate (Tessalon Perles) 100 MG capsule; Take 1 capsule by mouth 3 (Three) Times a Day As Needed for Cough.  Dispense: 30 capsule; Refill: 0  -     predniSONE (DELTASONE) 10 MG tablet; Take 1 tablet by mouth Daily for 5 days.  Dispense: 5 tablet;  Refill: 0                    Follow Up:  If your symptoms are not resolving by the completion of your treatment or are worsening, see your primary care provider for follow up. If you don't have a primary care provider, you may go to any Urgent Care for re-evaluation. If you develop any life threatening symptoms, go to the nearest Emergency Department immediately or call EMS.               The use of  Video Visit was utilized during this visit, using both gis.to and [employer]. The use of a video visit has been reviewed with the patient and verbal informed consent has been obtained. No technical difficulties occurred during the visit.    is located at [address] [place] [id number]  Provider is located at [place], [place]

## 2024-01-29 ENCOUNTER — TELEPHONE (OUTPATIENT)
Dept: FAMILY MEDICINE CLINIC | Facility: CLINIC | Age: 64
End: 2024-01-29
Payer: COMMERCIAL

## 2024-01-29 NOTE — TELEPHONE ENCOUNTER
ATTEMPTED TO WARM TRANSFER BUT NO ANSWER    Caller: Mehnaz Denney    Relationship to patient: Self    Best call back number: [phone]    Chief complaint: LABS    Type of visit: LABS    Requested date: ASAP

## 2024-02-02 DIAGNOSIS — I10 ESSENTIAL HYPERTENSION: Primary | ICD-10-CM

## 2024-02-02 DIAGNOSIS — E78.00 HYPERCHOLESTEROLEMIA: ICD-10-CM

## 2024-02-02 DIAGNOSIS — E83.52 HYPERCALCEMIA: ICD-10-CM

## 2024-02-02 DIAGNOSIS — E61.1 IRON DEFICIENCY: ICD-10-CM

## 2024-02-15 LAB
ALBUMIN SERPL-MCNC: 4.2 G/DL (ref 3.5–5.2)
ALBUMIN/GLOB SERPL: 1.4 G/DL
ALP SERPL-CCNC: 115 U/L (ref 39–117)
ALT SERPL-CCNC: 18 U/L (ref 1–33)
AST SERPL-CCNC: 18 U/L (ref 1–32)
BASOPHILS # BLD AUTO: 0.01 10*3/MM3 (ref 0–0.2)
BASOPHILS NFR BLD AUTO: 0.3 % (ref 0–1.5)
BILIRUB SERPL-MCNC: 0.3 MG/DL (ref 0–1.2)
BUN SERPL-MCNC: 13 MG/DL (ref 8–23)
BUN/CREAT SERPL: 16.3 (ref 7–25)
CALCIUM SERPL-MCNC: 10.7 MG/DL (ref 8.6–10.5)
CHLORIDE SERPL-SCNC: 106 MMOL/L (ref 98–107)
CHOLEST SERPL-MCNC: 149 MG/DL (ref 0–200)
CHOLEST/HDLC SERPL: 2.22 {RATIO}
CO2 SERPL-SCNC: 25.9 MMOL/L (ref 22–29)
CREAT SERPL-MCNC: 0.8 MG/DL (ref 0.57–1)
EGFRCR SERPLBLD CKD-EPI 2021: 83.4 ML/MIN/1.73
EOSINOPHIL # BLD AUTO: 0.19 10*3/MM3 (ref 0–0.4)
EOSINOPHIL NFR BLD AUTO: 5.6 % (ref 0.3–6.2)
ERYTHROCYTE [DISTWIDTH] IN BLOOD BY AUTOMATED COUNT: 14.8 % (ref 12.3–15.4)
GLOBULIN SER CALC-MCNC: 3 GM/DL
GLUCOSE SERPL-MCNC: 90 MG/DL (ref 65–99)
HCT VFR BLD AUTO: 40.4 % (ref 34–46.6)
HDLC SERPL-MCNC: 67 MG/DL (ref 40–60)
HGB BLD-MCNC: 12.8 G/DL (ref 12–15.9)
IMM GRANULOCYTES # BLD AUTO: 0 10*3/MM3 (ref 0–0.05)
IMM GRANULOCYTES NFR BLD AUTO: 0 % (ref 0–0.5)
IRON SATN MFR SERPL: 17 % (ref 20–50)
IRON SERPL-MCNC: 55 MCG/DL (ref 37–145)
LDLC SERPL CALC-MCNC: 71 MG/DL (ref 0–100)
LYMPHOCYTES # BLD AUTO: 1.1 10*3/MM3 (ref 0.7–3.1)
LYMPHOCYTES NFR BLD AUTO: 32.3 % (ref 19.6–45.3)
MCH RBC QN AUTO: 23.4 PG (ref 26.6–33)
MCHC RBC AUTO-ENTMCNC: 31.7 G/DL (ref 31.5–35.7)
MCV RBC AUTO: 73.9 FL (ref 79–97)
MONOCYTES # BLD AUTO: 0.4 10*3/MM3 (ref 0.1–0.9)
MONOCYTES NFR BLD AUTO: 11.7 % (ref 5–12)
NEUTROPHILS # BLD AUTO: 1.71 10*3/MM3 (ref 1.7–7)
NEUTROPHILS NFR BLD AUTO: 50.1 % (ref 42.7–76)
PLATELET # BLD AUTO: 183 10*3/MM3 (ref 140–450)
POTASSIUM SERPL-SCNC: 4.3 MMOL/L (ref 3.5–5.2)
PROT SERPL-MCNC: 7.2 G/DL (ref 6–8.5)
RBC # BLD AUTO: 5.47 10*6/MM3 (ref 3.77–5.28)
SODIUM SERPL-SCNC: 141 MMOL/L (ref 136–145)
TIBC SERPL-MCNC: 319 MCG/DL
TRIGL SERPL-MCNC: 50 MG/DL (ref 0–150)
TSH SERPL DL<=0.005 MIU/L-ACNC: 2.82 UIU/ML (ref 0.27–4.2)
UIBC SERPL-MCNC: 264 MCG/DL (ref 112–346)
UNABLE TO VOID: NORMAL
VLDLC SERPL CALC-MCNC: 11 MG/DL (ref 5–40)
WBC # BLD AUTO: 3.41 10*3/MM3 (ref 3.4–10.8)

## 2024-02-26 RX ORDER — ROSUVASTATIN CALCIUM 10 MG/1
TABLET, COATED ORAL
Qty: 90 TABLET | Refills: 3 | Status: SHIPPED | OUTPATIENT
Start: 2024-02-26

## 2024-03-29 RX ORDER — AMLODIPINE BESYLATE 5 MG/1
5 TABLET ORAL DAILY
Qty: 90 TABLET | Refills: 0 | Status: SHIPPED | OUTPATIENT
Start: 2024-03-29

## 2024-04-08 RX ORDER — FERROUS SULFATE 325(65) MG
1 TABLET ORAL
Qty: 90 TABLET | Refills: 0 | Status: SHIPPED | OUTPATIENT
Start: 2024-04-08

## 2024-06-14 RX ORDER — AMLODIPINE BESYLATE 5 MG/1
5 TABLET ORAL DAILY
Qty: 90 TABLET | Refills: 1 | Status: SHIPPED | OUTPATIENT
Start: 2024-06-14

## 2024-06-14 NOTE — TELEPHONE ENCOUNTER
Caller: Mehnaz Denney    Relationship: Self    Best call back number: [phone]    Requested Prescriptions:   Requested Prescriptions     Pending Prescriptions Disp Refills    amLODIPine (NORVASC) 5 MG tablet 90 tablet 0     Sig: Take 1 tablet by mouth Daily.        Pharmacy where request should be sent: [facility] [id number] [facility] [address] [facility] AT [facility] [phone] [facility] [phone] FX     Last office visit with prescribing clinician: 1/5/2024   Last telemedicine visit with prescribing clinician: Visit date not found   Next office visit with prescribing clinician: 7/29/2024     Additional details provided by patient: N/A    Does the patient have less than a 3 day supply:  [] Yes  [x] No    Would you like a call back once the refill request has been completed: [] Yes [x] No    If the office needs to give you a call back, can they leave a voicemail: [] Yes [x] No    Arely Eldridge Rep   06/14/24 14:40 EDT

## 2024-07-05 ENCOUNTER — TELEMEDICINE (OUTPATIENT)
Dept: FAMILY MEDICINE CLINIC | Facility: TELEHEALTH | Age: 64
End: 2024-07-05
Payer: COMMERCIAL

## 2024-07-05 DIAGNOSIS — R39.89 SUSPECTED UTI: Primary | ICD-10-CM

## 2024-07-05 RX ORDER — SULFAMETHOXAZOLE AND TRIMETHOPRIM 800; 160 MG/1; MG/1
1 TABLET ORAL 2 TIMES DAILY
Qty: 10 TABLET | Refills: 0 | Status: SHIPPED | OUTPATIENT
Start: 2024-07-05 | End: 2024-07-10

## 2024-07-05 NOTE — PROGRESS NOTES
Subjective   Chief Complaint   Patient presents with    Urinary Tract Infection       Mehnaz Denney is a 63 y.o. female.     History of Present Illness  Patient reports burning with urination, frequency and urgency for about 3 days.  She has been taking AZO over-the-counter which has provided some relief of symptoms but symptoms are still present.  Symptoms feel similar to UTIs she has had in the past. She prefers tablet medication instead of capsules.   Urinary Tract Infection   This is a new problem. Episode onset: 3 days. The problem occurs constantly. The problem has been unchanged. The quality of the pain is described as burning. The pain is mild. There has been no fever. There is no history of pyelonephritis. Associated symptoms include frequency and urgency. Pertinent negatives include no chills, discharge, flank pain, hematuria, hesitancy, nausea, possible pregnancy, sweats or vomiting. Treatments tried: azo. The treatment provided mild relief.        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    PONV (postoperative nausea and vomiting)     Post-menopause bleeding     Uterine fibroid        Past Surgical History:   Procedure Laterality Date     SECTION      TWINS    CYSTOSCOPY W/ URETERAL STENT PLACEMENT Right 3/18/2021    Procedure: CYSTOSCOPY, RIGHT  URETERAL STENT INSERTION, AND RUSH CATHETER PLACEMENT;  Surgeon: Remy Bowens MD;  Location: [facility];  Service: Urology;  Laterality: Right;    D & C WITH SUCTION      D & C WITH SUCTION      EXTRACORPOREAL SHOCKWAVE LITHOTRIPSY (ESWL), STENT INSERTION/REMOVAL Right 2021    Procedure: RIGHT EXTRACORPOREAL SHOCKWAVE  LITHOTRIPSY;  Surgeon: Nito Felix MD;  Location: [facility] OR;  Service: Urology;  Laterality: Right;    LAPAROSCOPIC TUBAL LIGATION Bilateral 1997    TOTAL ABDOMINAL HYSTERECTOMY Bilateral 11/12/2021    Procedure: TOTAL ABDOMINAL HYSTERECTOMY and bilateral salpingoopherectomy;  Surgeon: Mabel Soto MD;  Location: [facility];  Service: Obstetrics/Gynecology;  Laterality: Bilateral;    TOTAL ABDOMINAL HYSTERECTOMY WITH SALPINGO OOPHORECTOMY      WISDOM TOOTH EXTRACTION         Social History     Socioeconomic History    Marital status: Single   Tobacco Use    Smoking status: Never     Passive exposure: Never    Smokeless tobacco: Never   Vaping Use    Vaping status: Never Used   Substance and Sexual Activity    Alcohol use: Not Currently     Alcohol/week: 1.0 standard drink of alcohol     Types: 1 Glasses of wine per week     Comment: Occ//Caffeine use: 2-3 cups daily    Drug use: Never    Sexual activity: Defer     Partners: Male     Birth control/protection: Tubal ligation       Family History   Adopted: Yes   Problem Relation Age of Onset    Hypertension Mother     Hypertension Father     No Known Problems Daughter     No Known Problems Daughter     Malig Hyperthermia Neg Hx          Current Outpatient Medications:     amLODIPine (NORVASC) 5 MG tablet, Take 1 tablet by mouth Daily., Disp: 90 tablet, Rfl: 1    benzonatate (Tessalon Perles) 100 MG capsule, Take 1 capsule by mouth 3 (Three) Times a Day As Needed for Cough., Disp: 30 capsule, Rfl: 0    FeroSul 325 (65 Fe) MG tablet, TAKE 1 TABLET BY MOUTH DAILY WITH BREAKFAST, Disp: 90 tablet, Rfl: 0    Multiple Vitamin (MULTI-VITAMIN DAILY PO), Take 1 tablet by mouth Daily., Disp: , Rfl:     rosuvastatin (CRESTOR) 10 MG tablet, TAKE ONE TABLET BY MOUTH DAILY, Disp: 90 tablet, Rfl: 3    sulfamethoxazole-trimethoprim (Bactrim DS) 800-160 MG per tablet, Take 1 tablet by mouth 2 (Two) Times a Day for 5 days., Disp: 10 tablet, Rfl: 0      Review of  Systems   Constitutional:  Negative for chills, diaphoresis, fatigue and fever.   Gastrointestinal:  Negative for abdominal distention, abdominal pain, nausea and vomiting.   Genitourinary:  Positive for dysuria, frequency and urgency. Negative for decreased urine volume, flank pain, genital sores, hematuria, hesitancy, urinary incontinence, vaginal bleeding, vaginal discharge and vaginal pain.   Musculoskeletal:  Negative for back pain.        There were no vitals filed for this visit.    Objective   Physical Exam  Constitutional:       General: She is not in acute distress.     Appearance: Normal appearance. She is not ill-appearing, toxic-appearing or diaphoretic.   HENT:      Head: Normocephalic.      Mouth/Throat:      Lips: Pink.      Mouth: Mucous membranes are moist.   Pulmonary:      Effort: Pulmonary effort is normal.   Abdominal:      Tenderness: There is no abdominal tenderness (per pt).   Neurological:      Mental Status: She is alert and oriented to person, place, and time.          Procedures     Assessment & Plan   Diagnoses and all orders for this visit:    1. Suspected UTI (Primary)  -     sulfamethoxazole-trimethoprim (Bactrim DS) 800-160 MG per tablet; Take 1 tablet by mouth 2 (Two) Times a Day for 5 days.  Dispense: 10 tablet; Refill: 0        Wipe front to back, increase water to flush the kidneys and avoid bladder irritants such as caffeine and alcohol.    -Warning signs: severe abdominal/pelvic/back pain, fever >101, blood in urine - seek medical attention as soon as possible for a hands on/objective exam and possible labs.     If symptoms worsen or do not improve follow up with your PCP or visit your nearest Urgent Care Center or ER.    PLAN: Discussed dosing, side effects, recommended other symptomatic care.  Patient should follow up with primary care provider, Urgent Care or ER if symptoms worsen, fail to resolve or other symptoms need attention. Patient/family agree to the above.          JUAN MANUEL Hemphill     The use of a video visit has been reviewed with the patient and verbal informed consent has been obtained. Myself and Mehnaz Denney participated in this visit. The patient is located at [address] [place] [address]. I am located in [place], [place]. Mychart and Zoom were utilized.        This visit was performed via Telehealth.  This patient has been instructed to follow-up with their primary care provider if their symptoms worsen or the treatment provided does not resolve their illness.

## 2024-07-16 ENCOUNTER — HOSPITAL ENCOUNTER (EMERGENCY)
Facility: HOSPITAL | Age: 64
Discharge: HOME | End: 2024-07-16
Attending: EMERGENCY MEDICINE
Payer: COMMERCIAL

## 2024-07-16 ENCOUNTER — APPOINTMENT (OUTPATIENT)
Dept: CT IMAGING | Facility: HOSPITAL | Age: 64
End: 2024-07-16
Payer: COMMERCIAL

## 2024-07-16 VITALS
HEART RATE: 103 BPM | TEMPERATURE: 98.4 F | BODY MASS INDEX: 27 KG/M2 | HEIGHT: 67 IN | RESPIRATION RATE: 19 BRPM | OXYGEN SATURATION: 100 % | WEIGHT: 172 LBS | SYSTOLIC BLOOD PRESSURE: 166 MMHG | DIASTOLIC BLOOD PRESSURE: 94 MMHG

## 2024-07-16 DIAGNOSIS — R31.9 HEMATURIA, UNSPECIFIED TYPE: ICD-10-CM

## 2024-07-16 DIAGNOSIS — R30.0 DYSURIA: Primary | ICD-10-CM

## 2024-07-16 LAB
BACTERIA UR QL AUTO: ABNORMAL /HPF
BILIRUB UR QL STRIP: NEGATIVE
CLARITY UR: ABNORMAL
COLOR UR: ABNORMAL
GLUCOSE UR STRIP-MCNC: NEGATIVE MG/DL
HGB UR QL STRIP.AUTO: ABNORMAL
HOLD SPECIMEN: NORMAL
HYALINE CASTS UR QL AUTO: ABNORMAL /LPF
KETONES UR QL STRIP: NEGATIVE
LEUKOCYTE ESTERASE UR QL STRIP.AUTO: NEGATIVE
NITRITE UR QL STRIP: NEGATIVE
PH UR STRIP.AUTO: 6.5 [PH] (ref 5–8)
PROT UR QL STRIP: ABNORMAL
RBC # UR STRIP: ABNORMAL /HPF
REF LAB TEST METHOD: ABNORMAL
SP GR UR STRIP: 1.01 (ref 1–1.03)
SQUAMOUS #/AREA URNS HPF: ABNORMAL /HPF
UROBILINOGEN UR QL STRIP: ABNORMAL
WBC # UR STRIP: ABNORMAL /HPF

## 2024-07-16 PROCEDURE — 74176 CT ABD & PELVIS W/O CONTRAST: CPT

## 2024-07-16 PROCEDURE — 81001 URINALYSIS AUTO W/SCOPE: CPT | Performed by: EMERGENCY MEDICINE

## 2024-07-16 PROCEDURE — 99284 EMERGENCY DEPT VISIT MOD MDM: CPT

## 2024-07-16 PROCEDURE — 99284 EMERGENCY DEPT VISIT MOD MDM: CPT | Performed by: PHYSICIAN ASSISTANT

## 2024-07-16 RX ORDER — CEPHALEXIN 250 MG/5ML
POWDER, FOR SUSPENSION ORAL
Qty: 140 ML | Refills: 0 | Status: SHIPPED | OUTPATIENT
Start: 2024-07-16

## 2024-07-16 RX ORDER — CEPHALEXIN 500 MG/1
500 CAPSULE ORAL ONCE
Status: COMPLETED | OUTPATIENT
Start: 2024-07-16 | End: 2024-07-16

## 2024-07-16 RX ORDER — CEPHALEXIN 500 MG/1
500 CAPSULE ORAL 3 TIMES DAILY
Qty: 21 CAPSULE | Refills: 0 | Status: SHIPPED | OUTPATIENT
Start: 2024-07-16 | End: 2024-07-23

## 2024-07-16 RX ADMIN — CEPHALEXIN 500 MG: 500 CAPSULE ORAL at 23:40

## 2024-07-17 NOTE — DISCHARGE INSTRUCTIONS
Today on CAT scan we do not see any evidence of an obstructive kidney stone that is currently passing.  You do still have a retained stone within the right kidney as well as a calcium containing cyst on the left kidney.  The most important aspect is that we do not see a stone stuck in the ureter between the kidneys and the bladder.    We did send your urinalysis for culture.  We are going to treat you with a medication called Keflex for 1 week.  You did receive your first dose here tonight.    It is important to follow-up with [facility] as currently scheduled.  We are happy to see you at any time should you have increasing pain fever or other difficulties    Please read all of the instructions in this handout.  If you receive prescriptions please fill them and take them as directed.  Please call your primary care physician for follow-up appointment in the next 5 to 7 days.  If you do not have a physician you may call the [facility] referral line at [phone].    You may return to the emergency department at any time for any concerns such as worsening symptoms.  If you received a work or school note it will be printed at the back of this packet.

## 2024-07-17 NOTE — FSED PROVIDER NOTE
Subjective   History of Present Illness    Patient, history of kidney stones, is complaining of painful urination which started about 16 days ago, she had a telemedicine appointment 11 days ago and was prescribed Bactrim for 5 days.  Patient completed the Bactrim and the painful urination resolved but returned 2 days ago. She does have a history of kidney stones and reports that her current symptoms feel different, she does not have any flank pain.  Denies fever, nausea, vomiting.    Review of Systems   Constitutional:  Negative for activity change and appetite change.   Eyes:  Negative for pain.   Respiratory:  Negative for shortness of breath.    Gastrointestinal:  Negative for nausea and vomiting.   Genitourinary:  Positive for dysuria.   Musculoskeletal:  Negative for arthralgias.   Skin:  Negative for color change.   Neurological:  Negative for dizziness.   All other systems reviewed and are negative.      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    PONV (postoperative nausea and vomiting)     Post-menopause bleeding     Uterine fibroid        No Known Allergies    Past Surgical History:   Procedure Laterality Date     SECTION      TWINS    CYSTOSCOPY W/ URETERAL STENT PLACEMENT Right 3/18/2021    Procedure: CYSTOSCOPY, RIGHT  URETERAL STENT INSERTION, AND RUSH CATHETER PLACEMENT;  Surgeon: Remy Bowens MD;  Location: [facility];  Service: Urology;  Laterality: Right;    D & C WITH SUCTION      D & C WITH SUCTION      EXTRACORPOREAL SHOCKWAVE LITHOTRIPSY (ESWL), STENT INSERTION/REMOVAL Right 2021    Procedure: RIGHT EXTRACORPOREAL SHOCKWAVE LITHOTRIPSY;  Surgeon: Nito Felix MD;   Location: [facility] OR;  Service: Urology;  Laterality: Right;    LAPAROSCOPIC TUBAL LIGATION Bilateral 1997    TOTAL ABDOMINAL HYSTERECTOMY Bilateral 11/12/2021    Procedure: TOTAL ABDOMINAL HYSTERECTOMY and bilateral salpingoopherectomy;  Surgeon: Mabel Soto MD;  Location: [facility];  Service: Obstetrics/Gynecology;  Laterality: Bilateral;    TOTAL ABDOMINAL HYSTERECTOMY WITH SALPINGO OOPHORECTOMY      WISDOM TOOTH EXTRACTION         Family History   Adopted: Yes   Problem Relation Age of Onset    Hypertension Mother     Hypertension Father     No Known Problems Daughter     No Known Problems Daughter     Malig Hyperthermia Neg Hx        Social History     Socioeconomic History    Marital status: Single   Tobacco Use    Smoking status: Never     Passive exposure: Never    Smokeless tobacco: Never   Vaping Use    Vaping status: Never Used   Substance and Sexual Activity    Alcohol use: Not Currently     Alcohol/week: 1.0 standard drink of alcohol     Types: 1 Glasses of wine per week     Comment: Occ//Caffeine use: 2-3 cups daily    Drug use: Never    Sexual activity: Defer     Partners: Male     Birth control/protection: Tubal ligation           Objective   Physical Exam  Vitals and nursing note reviewed.   Constitutional:       Appearance: Normal appearance. She is normal weight.   HENT:      Head: Normocephalic and atraumatic.      Nose: Nose normal.      Mouth/Throat:      Mouth: Mucous membranes are moist.   Eyes:      Pupils: Pupils are equal, round, and reactive to light.   Cardiovascular:      Rate and Rhythm: Normal rate and regular rhythm.      Pulses: Normal pulses.      Heart sounds: Normal heart sounds.   Pulmonary:      Effort: Pulmonary effort is normal.      Breath sounds: Normal breath sounds.   Musculoskeletal:         General: Normal range of motion.      Cervical back: Normal range of motion.      Right lower leg: No edema.      Left lower leg: No edema.   Skin:     General: Skin is  warm.   Neurological:      General: No focal deficit present.      Mental Status: She is alert.   Psychiatric:         Behavior: Behavior is cooperative.         Procedures           ED Course  ED Course as of 07/16/24 2244 Tue Jul 16, 2024 2033 Blood, UA(!): Large (3+) [SS]      ED Course User Index  [SS] Luh Gomes PA-C                                           Medical Decision Making  Amount and/or Complexity of Data Reviewed  Labs:  Decision-making details documented in ED Course.  Radiology: ordered.        Final diagnoses:   Dysuria       ED Disposition  ED Disposition       ED Disposition   Discharge    Condition   Stable    Comment   --               No follow-up provider specified.       Medication List        New Prescriptions      cephalexin 500 MG capsule  Commonly known as: KEFLEX  Take 1 capsule by mouth 3 (Three) Times a Day for 7 days.               Where to Get Your Medications        These medications were sent to [facility] [id number] - [place], [place] - [address] AT [facility] - [phone]  - [phone]   [address], [facility] [address]      Phone: [phone]   cephalexin 500 MG capsule

## 2024-10-04 ENCOUNTER — OFFICE VISIT (OUTPATIENT)
Dept: FAMILY MEDICINE CLINIC | Facility: CLINIC | Age: 64
End: 2024-10-04
Payer: COMMERCIAL

## 2024-10-04 VITALS
TEMPERATURE: 96.8 F | BODY MASS INDEX: 26.93 KG/M2 | HEART RATE: 102 BPM | DIASTOLIC BLOOD PRESSURE: 88 MMHG | SYSTOLIC BLOOD PRESSURE: 150 MMHG | WEIGHT: 171.6 LBS | OXYGEN SATURATION: 97 % | HEIGHT: 67 IN | RESPIRATION RATE: 16 BRPM

## 2024-10-04 DIAGNOSIS — D50.8 IRON DEFICIENCY ANEMIA SECONDARY TO INADEQUATE DIETARY IRON INTAKE: ICD-10-CM

## 2024-10-04 DIAGNOSIS — E78.00 HYPERCHOLESTEROLEMIA: ICD-10-CM

## 2024-10-04 DIAGNOSIS — Z12.31 ENCOUNTER FOR SCREENING MAMMOGRAM FOR MALIGNANT NEOPLASM OF BREAST: ICD-10-CM

## 2024-10-04 DIAGNOSIS — Z12.11 COLON CANCER SCREENING: ICD-10-CM

## 2024-10-04 DIAGNOSIS — D50.0 IRON DEFICIENCY ANEMIA DUE TO CHRONIC BLOOD LOSS: ICD-10-CM

## 2024-10-04 DIAGNOSIS — I10 ESSENTIAL HYPERTENSION: Primary | ICD-10-CM

## 2024-10-04 NOTE — PROGRESS NOTES
Subjective   Mehnaz Denney is a 64 y.o. female. Patient is here today for   Chief Complaint   Patient presents with    Hypertension        History of Present Illness  History of Present Illness  The patient presents for evaluation of multiple medical concerns.    She is curious about the status of her kidney stones, as she has not observed them passing. She recalls a previous instance where the stone was too large to pass naturally and required shockwave treatment. A subsequent laser procedure was successful in removing the stone without the need for surgery. She has been diligent in taking her prescribed iron supplements and incorporating broccoli and spinach into her diet. She reports no pain or blood in her urine. She has increased her water intake and reduced her consumption of [employer] coffee and ice cream since her last kidney stone episode. She also mentions an unusual craving for ice since starting her iron supplements.    She expresses concern about her blood pressure, unsure if it is elevated due to nervousness. She monitors her blood pressure at home, which typically reads around 117/72 in the morning. She notes a slight increase in the evening, which she attributes to her daily activities. She is currently on amlodipine for blood pressure management and reports no swelling in her feet.    Supplemental Information:  She is going on a cruise at the end of 2024. She is past menopause. Her ex- passed away in .    SOCIAL HISTORY  She works from home as a  for the hospital.    FAMILY HISTORY  She denies family history of colon cancer.      Vitals:    10/04/24 0927   BP: 150/88   Pulse: 102   Resp: 16   Temp: 96.8 °F (36 °C)   SpO2: 97%     Body mass index is 26.87 kg/m².    Past Medical History:   Diagnosis Date    Asymmetric septal hypertrophy     COVID-19 virus detected 2022    Grief     X-   2021    History of anemia     History of kidney stones      Hyperlipidemia     Hypertension     Incomplete right bundle branch block (RBBB) with left anterior fascicular block 1/1/2021    PONV (postoperative nausea and vomiting)     Post-menopause bleeding     Uterine fibroid       No Known Allergies   Social History     Socioeconomic History    Marital status: Single   Tobacco Use    Smoking status: Never     Passive exposure: Never    Smokeless tobacco: Never   Vaping Use    Vaping status: Never Used   Substance and Sexual Activity    Alcohol use: Not Currently     Alcohol/week: 1.0 standard drink of alcohol     Types: 1 Glasses of wine per week     Comment: Occ//Caffeine use: 2-3 cups daily    Drug use: Never    Sexual activity: Defer     Partners: Male     Birth control/protection: Tubal ligation        Current Outpatient Medications:     amLODIPine (NORVASC) 5 MG tablet, Take 1 tablet by mouth Daily., Disp: 90 tablet, Rfl: 1    benzonatate (Tessalon Perles) 100 MG capsule, Take 1 capsule by mouth 3 (Three) Times a Day As Needed for Cough., Disp: 30 capsule, Rfl: 0    FeroSul 325 (65 Fe) MG tablet, TAKE 1 TABLET BY MOUTH DAILY WITH BREAKFAST, Disp: 90 tablet, Rfl: 0    Multiple Vitamin (MULTI-VITAMIN DAILY PO), Take 1 tablet by mouth Daily., Disp: , Rfl:     rosuvastatin (CRESTOR) 10 MG tablet, TAKE ONE TABLET BY MOUTH DAILY, Disp: 90 tablet, Rfl: 3     Objective     Review of Systems   Constitutional: Negative.    HENT: Negative.     Respiratory: Negative.     Cardiovascular: Negative.    Musculoskeletal: Negative.    Psychiatric/Behavioral: Negative.         Physical Exam  Vitals and nursing note reviewed.   Constitutional:       General: She is not in acute distress.     Appearance: Normal appearance. She is not ill-appearing, toxic-appearing or diaphoretic.      Comments: Pleasant, youthful 64-year-old woman.   Cardiovascular:      Rate and Rhythm: Regular rhythm.      Heart sounds: Normal heart sounds. No murmur heard.     No gallop.   Pulmonary:      Effort: No  respiratory distress.      Breath sounds: Normal breath sounds. No wheezing or rales.   Neurological:      Mental Status: She is alert and oriented to person, place, and time.   Psychiatric:         Mood and Affect: Mood normal.         Behavior: Behavior normal.         Thought Content: Thought content normal.       Physical Exam  Vital Signs  Blood pressure is 130/78.    Results  Laboratory Studies  MCV was low, indicating small red blood cells consistent with iron deficiency.    Imaging  CT scan showed nonobstructing stones in the right lower pole of kidney and layering high density of left upper pole, probably calcium within a cyst or calyx.    Assessment & Plan    Problems Addressed this Visit          Cardiac and Vasculature    Essential hypertension - Primary    Relevant Orders    Comprehensive metabolic panel    Hypercholesterolemia    Relevant Orders    Lipid Panel With LDL/HDL Ratio    CBC & Differential    Comprehensive Metabolic Panel    Lipid Panel With LDL / HDL Ratio    CBC & Differential    Comprehensive Metabolic Panel    Lipid Panel With LDL / HDL Ratio       Gastrointestinal Abdominal     Colon cancer screening    Relevant Orders    Cologuard - Stool, Per Rectum       Hematology and Neoplasia    Iron deficiency anemia due to chronic blood loss    Relevant Orders    Iron and TIBC    Iron deficiency anemia secondary to inadequate dietary iron intake    Relevant Orders    Iron Profile    Iron Profile     Other Visit Diagnoses       Encounter for screening mammogram for malignant neoplasm of breast        Relevant Orders    Mammo Screening Bilateral With CAD          Diagnoses         Codes Comments    Essential hypertension    -  Primary ICD-10-CM: I10  ICD-9-CM: 401.9     Hypercholesterolemia     ICD-10-CM: E78.00  ICD-9-CM: 272.0     Iron deficiency anemia due to chronic blood loss     ICD-10-CM: D50.0  ICD-9-CM: 280.0     Iron deficiency anemia secondary to inadequate dietary iron intake      ICD-10-CM: D50.8  ICD-9-CM: 280.1     Encounter for screening mammogram for malignant neoplasm of breast     ICD-10-CM: Z12.31  ICD-9-CM: V76.12     Colon cancer screening     ICD-10-CM: Z12.11  ICD-9-CM: V76.51           Assessment & Plan  1. Kidney Stones.  Her CT scan from 07/16/2024 revealed nonobstructing stones in the right lower pole of the kidney and a high-density layer in the left upper pole, likely calcium within a cyst or calyx. The urologist's note indicated that she may have passed the stones spontaneously. She was advised to complete her antibiotic course and consider a cystoscopy. A KUB (kidney, ureter, bladder) x-ray was recommended in 6 months. Currently, she is asymptomatic. Lab work will be conducted today.    2. Hypertension.  Her blood pressure readings are within normal range, with an average of 117/72 at home and 130/78 in the office. She is currently taking amlodipine. No changes to her medication regimen are needed at this time.    3. Iron Deficiency.  Her lab work from 02/14/2024 showed no signs of anemia, but her MCV was low, indicating small red blood cells consistent with iron deficiency. She continues to take iron pills but experiences constipation. She is advised to continue consuming high-fiber foods like broccoli and spinach to help alleviate constipation.    4. Health Maintenance.  She is due for a mammogram and colon cancer screening. A Cologuard test will be ordered for colon cancer screening. A mammogram will also be ordered. She declined the influenza vaccine as she receives it through her employer.    Follow-up  Return in 6 months for a physical examination.      No follow-ups on file.    Patient or patient representative verbalized consent for the use of Ambient Listening during the visit with  Efrem Cantrell MD for chart documentation. 10/4/2024  13:04 EDT

## 2024-10-18 DIAGNOSIS — Z12.31 ENCOUNTER FOR SCREENING MAMMOGRAM FOR MALIGNANT NEOPLASM OF BREAST: Primary | ICD-10-CM

## 2025-01-02 RX ORDER — AMLODIPINE BESYLATE 5 MG/1
5 TABLET ORAL DAILY
Qty: 30 TABLET | Refills: 3 | Status: SHIPPED | OUTPATIENT
Start: 2025-01-02

## 2025-02-19 RX ORDER — ROSUVASTATIN CALCIUM 10 MG/1
10 TABLET, COATED ORAL DAILY
Qty: 90 TABLET | Refills: 3 | Status: SHIPPED | OUTPATIENT
Start: 2025-02-19

## 2025-02-25 ENCOUNTER — TELEMEDICINE (OUTPATIENT)
Dept: FAMILY MEDICINE CLINIC | Facility: TELEHEALTH | Age: 65
End: 2025-02-25
Payer: COMMERCIAL

## 2025-02-25 DIAGNOSIS — R04.0 EPISTAXIS: Primary | ICD-10-CM

## 2025-02-25 NOTE — PATIENT INSTRUCTIONS
Nosebleed, Adult  A nosebleed is when blood comes out of the nose. Nosebleeds are common and can be caused by many things. They are usually not a sign of a serious medical problem.  Follow these instructions at home:  When you have a nosebleed:    Sit down.  Tilt your head forward a little.  Follow these steps:  Pinch your nose with a clean towel or tissue.  Keep pinching your nose for 5 minutes. Do not let go.  After 5 minutes, let go of your nose.  Keep doing these steps until the bleeding stops.  Do not put tissues or other things in your nose to stop the bleeding.  Avoid lying down or putting your head back.  Use a nose spray decongestant as told by your doctor.  After a nosebleed:  Try not to blow your nose or sniffle for several hours.  Try not to strain, lift, or bend at the waist for several days.  Aspirin and medicines that thin your blood make bleeding more likely. If you take these medicines:  Ask your doctor if you should stop taking them or if you should change how much you take.  Do not stop taking the medicine unless your doctor tells you to.  If your nosebleed was caused by dryness, use over-the-counter saline nasal spray or gel and a humidifier as told by your doctor. This will keep the inside of your nose moist and allow it to heal. If you need to use nasal spray or gel:  Choose one that is water-soluble.  Use only as much as you need and use it only as often as needed.  Do not lie down right away after you use it.  If you get nosebleeds often, talk with your doctor about treatments. These may include:  Nasal cautery. A chemical swab or electrical device is used to lightly burn tiny blood vessels inside the nose. This helps stop or prevent nosebleeds.  Nasal packing. A gauze or other material is placed in the nose to keep constant pressure on the bleeding area.  Contact a doctor if:  You have a fever.  You get nosebleeds often.  You get nosebleeds more often than usual.  You bruise very  easily.  You have something stuck in your nose.  You are bleeding in your mouth.  You vomit or cough up brown material.  You get a nosebleed after you start a new medicine.  Get help right away if:  You have a nosebleed after you fall or hurt your head.  Your nosebleed does not go away after 20 minutes.  You feel dizzy or weak.  You have unusual bleeding from other parts of your body.  You have unusual bruising on other parts of your body.  You get sweaty.  You vomit blood.  Summary  Nosebleeds are common. They are usually not a sign of a serious medical problem.  When you have a nosebleed, sit down and tilt your head a little forward. Pinch your nose with a clean tissue for 5 minutes.  Use saline spray or saline gel and a humidifier as told by your doctor.  Get help right away if your nosebleed does not go away after 20 minutes.  This information is not intended to replace advice given to you by your health care provider. Make sure you discuss any questions you have with your health care provider.  Document Revised: 12/27/2022 Document Reviewed: 12/27/2022  Elsevier Patient Education © 2024 [employer]

## 2025-02-25 NOTE — PATIENT INSTRUCTIONS
Chalazion    A chalazion is a swelling or lump on the eyelid. It can affect the upper eyelid or the lower eyelid.  What are the causes?  This condition may be caused by:  · Long-lasting (chronic) inflammation of the eyelid glands.  · A blocked oil gland in the eyelid.  What are the signs or symptoms?  Symptoms of this condition include:  · Swelling of the eyelid. The swelling may spread to areas around the eye.  · A hard lump on the eyelid.  · Blurry vision. The lump on the eyelid may make it hard to see out of the eye.  How is this diagnosed?  This condition is diagnosed with an examination of the eye.  How is this treated?  This condition is treated by applying a warm compress to the eyelid. If the condition does not improve, it may be treated with:  · Medicine that is injected into the chalazion by a health care provider.  · Surgery.  · Medicine that is applied to the eye.  Follow these instructions at home:  Managing pain and swelling  · Apply a warm, moist compress to the eyelid 4-6 times a day for 10-15 minutes at a time. This will help to open any blocked glands and to reduce redness and swelling.  · Apply over-the-counter and prescription medicines only as told by your health care provider.  General instructions  · Do not touch the chalazion.  · Do not try to remove the pus. Do not squeeze the chalazion or stick it with a pin or needle.  · Do not rub your eyes.  · Wash your hands often. Dry your hands with a clean towel.  · Keep your face, scalp, and eyebrows clean.  · Avoid wearing eye makeup.  · If the chalazion does not break open (rupture) on its own, return to your health care provider.  · Keep all follow-up appointments as told by your health care provider. This is important.  Contact a health care provider if:  · Your eyelid has not improved in 4 weeks.  · Your eyelid is getting worse.  · You have a fever.  · The chalazion does not rupture on its own after a month of home treatment.  Get help right  away if:  · You have pain in your eye.  · Your vision changes.  · The chalazion becomes painful or red.  · The chalazion gets bigger.  Summary  · A chalazion is a swelling or lump on the upper or lower eyelid.  · It may be caused by chronic inflammation or a blocked oil gland.  · Apply a warm, moist compress to the eyelid 4-6 times a day for 10-15 minutes at a time.  · Keep your face, scalp, and eyebrows clean.  This information is not intended to replace advice given to you by your health care provider. Make sure you discuss any questions you have with your health care provider.  Document Revised: 06/06/2019 Document Reviewed: 06/06/2019  [employer] Patient Education © 2021 [employer]  Stye    A stye, also known as a hordeolum, is a bump that forms on an eyelid. It may look like a pimple next to the eyelash. A stye can form inside the eyelid (internal stye) or outside the eyelid (external stye). A stye can cause redness, swelling, and pain on the eyelid.  Styes are very common. Anyone can get them at any age. They usually occur in just one eye, but you may have more than one in either eye.  What are the causes?  A stye is caused by an infection. The infection is almost always caused by bacteria called Staphylococcus aureus. This is a common type of bacteria that lives on the skin.  An internal stye may result from an infected oil-producing gland inside the eyelid. An external stye may be caused by an infection at the base of the eyelash (hair follicle).  What increases the risk?  You are more likely to develop a stye if:  · You have had a stye before.  · You have any of these conditions:  ? Diabetes.  ? Red, itchy, inflamed eyelids (blepharitis).  ? A skin condition such as seborrheic dermatitis or rosacea.  ? High fat levels in your blood (lipids).  What are the signs or symptoms?  The most common symptom of a stye is eyelid pain. Internal styes are more painful than external styes. Other symptoms may  include:  · Painful swelling of your eyelid.  · A scratchy feeling in your eye.  · Tearing and redness of your eye.  · Pus draining from the stye.  How is this diagnosed?  Your health care provider may be able to diagnose a stye just by examining your eye. The health care provider may also check to make sure:  · You do not have a fever or other signs of a more serious infection.  · The infection has not spread to other parts of your eye or areas around your eye.  How is this treated?  Most styes will clear up in a few days without treatment or with warm compresses applied to the area. You may need to use antibiotic drops or ointment to treat an infection.  In some cases, if your stye does not heal with routine treatment, your health care provider may drain pus from the stye using a thin blade or needle. This may be done if the stye is large, causing a lot of pain, or affecting your vision.  Follow these instructions at home:  · Take over-the-counter and prescription medicines only as told by your health care provider. This includes eye drops or ointments.  · If you were prescribed an antibiotic medicine, apply or use it as told by your health care provider. Do not stop using the antibiotic even if your condition improves.  · Apply a warm, wet cloth (warm compress) to your eye for 5-10 minutes, 4 times a day.  · Clean the affected eyelid as directed by your health care provider.  · Do not wear contact lenses or eye makeup until your stye has healed.  · Do not try to pop or drain the stye.  · Do not rub your eye.  Contact a health care provider if:  · You have chills or a fever.  · Your stye does not go away after several days.  · Your stye affects your vision.  · Your eyeball becomes swollen, red, or painful.  Get help right away if:  · You have pain when moving your eye around.  Summary  · A stye is a bump that forms on an eyelid. It may look like a pimple next to the eyelash.  · A stye can form inside the eyelid  (internal stye) or outside the eyelid (external stye). A stye can cause redness, swelling, and pain on the eyelid.  · Your health care provider may be able to diagnose a stye just by examining your eye.  · Apply a warm, wet cloth (warm compress) to your eye for 5-10 minutes, 4 times a day.  This information is not intended to replace advice given to you by your health care provider. Make sure you discuss any questions you have with your health care provider.  Document Revised: 11/30/2018 Document Reviewed: 08/30/2018  [employer] Patient Education © 2021 [employer]     2 = A lot of assistance

## 2025-02-25 NOTE — PROGRESS NOTES
No chief complaint on file.      Video Visit Reason:   Free Text Description: Nosebleed  Subjective   Mehnaz Denney is a 64 y.o. female.     History of Present Illness  The patient presents via virtual visit for evaluation of nosebleeds.    She reports experiencing epistaxis, which she attributes to the dry air in her environment. She has a history of hypertension and is seeking advice on the use of Afrin, a medication she has procured. [place] and habitually sleeps with a fan directed towards her, a practice she acknowledges may be exacerbating her symptoms. She does not utilize a cool mist humidifier in her bedroom. Her last episode of epistaxis occurred during her childhood.        The following portions of the patient's history were reviewed and updated as appropriate: allergies, current medications, past medical history, and problem list.      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    PONV (postoperative nausea and vomiting)     Post-menopause bleeding     Uterine fibroid      Social History     Socioeconomic History    Marital status: Single   Tobacco Use    Smoking status: Never     Passive exposure: Never    Smokeless tobacco: Never   Vaping Use    Vaping status: Never Used   Substance and Sexual Activity    Alcohol use: Not Currently     Alcohol/week: 1.0 standard drink of alcohol     Types: 1 Glasses of wine per week     Comment: Occ//Caffeine use: 2-3 cups daily    Drug use: Never    Sexual activity: Defer     Partners: Male     Birth control/protection: Tubal ligation     medication documentation: reviewed and updated with patient and   Current Outpatient Medications:     amLODIPine (NORVASC)  5 MG tablet, TAKE 1 TABLET BY MOUTH DAILY, Disp: 30 tablet, Rfl: 3    benzonatate (Tessalon Perles) 100 MG capsule, Take 1 capsule by mouth 3 (Three) Times a Day As Needed for Cough., Disp: 30 capsule, Rfl: 0    FeroSul 325 (65 Fe) MG tablet, TAKE 1 TABLET BY MOUTH DAILY WITH BREAKFAST, Disp: 90 tablet, Rfl: 0    Multiple Vitamin (MULTI-VITAMIN DAILY PO), Take 1 tablet by mouth Daily., Disp: , Rfl:     rosuvastatin (CRESTOR) 10 MG tablet, TAKE 1 TABLET BY MOUTH DAILY, Disp: 90 tablet, Rfl: 3  Review of Systems  See HPI  Objective   Physical Exam  Constitutional:       General: She is not in acute distress.     Appearance: She is not ill-appearing.   HENT:      Nose: No congestion.   Neurological:      Mental Status: She is alert.         Assessment & Plan   Diagnoses and all orders for this visit:    1. Epistaxis (Primary)       Assessment & Plan  1. Epistaxis.  The patient reports experiencing nosebleeds, likely due to dry air and the use of a fan while sleeping. She was advised that Afrin can be used occasionally to stop the bleeding as it causes blood vessels to constrict temporarily. However, she should monitor her blood pressure regularly, as elevated blood pressure can exacerbate nosebleeds. If her blood pressure is consistently high, she should consult her primary care physician for potential medication adjustments. She was also advised to apply Vaseline inside her nostrils using a Q-tip to maintain nasal moisture. To mitigate dryness, the use of a cool mist humidifier in the bedroom was recommended.             Follow Up:  If your symptoms are not resolving by the completion of your treatment or are worsening, see your primary care provider for follow up. If you don't have a primary care provider, you may go to any Urgent Care for re-evaluation. If you develop any life threatening symptoms, go to the nearest Emergency Department immediately or call EMS.       Patient or patient representative verbalized  consent for the use of Ambient Listening during the visit with  JUAN MANUEL Wilburn for chart documentation. 2/25/2025  10:31 EST        The use of  Video Visit was utilized during this visit, using both [employer] and [facility]. The use of a video visit has been reviewed with the patient and verbal informed consent has been obtained. No technical difficulties occurred during the visit.    is located at [address] [place] [id number]  Provider is located at [place], [place]

## 2025-04-01 RX ORDER — FERROUS SULFATE 325(65) MG
1 TABLET ORAL
Qty: 90 TABLET | Refills: 0 | Status: SHIPPED | OUTPATIENT
Start: 2025-04-01

## 2025-04-22 RX ORDER — AMLODIPINE BESYLATE 5 MG/1
5 TABLET ORAL DAILY
Qty: 30 TABLET | Refills: 1 | Status: SHIPPED | OUTPATIENT
Start: 2025-04-22

## 2025-06-02 ENCOUNTER — TELEPHONE (OUTPATIENT)
Dept: FAMILY MEDICINE CLINIC | Facility: CLINIC | Age: 65
End: 2025-06-02

## 2025-06-02 NOTE — TELEPHONE ENCOUNTER
Hub staff attempted to follow warm transfer process and was unsuccessful     Caller: Mehnaz Denney    Relationship to patient: Self    Best call back number: [phone]    Patient is needing: PATIENT IS NEEDING TO RESCHEDULE LABS ON 6/27/25 BUT H[facility] WAS UNABLE TO WARM TRANSFER. PATIENT IS NEEDING CALLBACK FOR SCHEDULING.

## 2025-06-03 NOTE — TELEPHONE ENCOUNTER
Caller: Mehnaz Denney    Relationship to patient: Self    Best call back number:     Chief complaint:     Type of visit: LAB    Requested date:      If rescheduling, when is the original appointment:      Additional notes: PATIENT IS SCHEDULE FOR A LAB BUT NEEDS TO RESCHEDULE IT FOR JULY CLOSE TO HER SCHEDULED PHYSICAL. SHE WANTS TO BE CALLED TO GET THIS RESCHEDULED.

## 2025-06-09 NOTE — SIGNIFICANT NOTE
03/19/21 1418   OTHER   Discipline speech language pathologist   Rehab Time/Intention   Session Not Performed patient/family declined evaluation     SLP evaluation and treatment orders generated per stroke protocol per order. Upon review, appears order was entered in error - pt with no neurological involvement, no prior or current hx of dysphagia and/or speech/language difficulties, ordering MD and RN are not on pt treatment team or consulted. Confirmed w/RN, SLP to sign off at this time. Please re-consult if indicated.    
Opt out

## 2025-06-18 RX ORDER — AMLODIPINE BESYLATE 5 MG/1
5 TABLET ORAL DAILY
Qty: 30 TABLET | Refills: 0 | Status: SHIPPED | OUTPATIENT
Start: 2025-06-18

## 2025-07-16 RX ORDER — AMLODIPINE BESYLATE 5 MG/1
5 TABLET ORAL DAILY
Qty: 30 TABLET | Refills: 0 | Status: SHIPPED | OUTPATIENT
Start: 2025-07-16

## 2025-07-19 LAB
ALBUMIN SERPL-MCNC: 4.3 G/DL (ref 3.5–5.2)
ALBUMIN/GLOB SERPL: 1.4 G/DL
ALP SERPL-CCNC: 109 U/L (ref 39–117)
ALT SERPL-CCNC: 15 U/L (ref 1–33)
AST SERPL-CCNC: 19 U/L (ref 1–32)
BASOPHILS # BLD AUTO: 0.01 10*3/MM3 (ref 0–0.2)
BASOPHILS NFR BLD AUTO: 0.3 % (ref 0–1.5)
BILIRUB SERPL-MCNC: 0.4 MG/DL (ref 0–1.2)
BUN SERPL-MCNC: 15 MG/DL (ref 8–23)
BUN/CREAT SERPL: 19.2 (ref 7–25)
CALCIUM SERPL-MCNC: 10.8 MG/DL (ref 8.6–10.5)
CHLORIDE SERPL-SCNC: 106 MMOL/L (ref 98–107)
CHOLEST SERPL-MCNC: 146 MG/DL (ref 0–200)
CO2 SERPL-SCNC: 27 MMOL/L (ref 22–29)
CREAT SERPL-MCNC: 0.78 MG/DL (ref 0.57–1)
EGFRCR SERPLBLD CKD-EPI 2021: 85.5 ML/MIN/1.73
EOSINOPHIL # BLD AUTO: 0.05 10*3/MM3 (ref 0–0.4)
EOSINOPHIL NFR BLD AUTO: 1.5 % (ref 0.3–6.2)
ERYTHROCYTE [DISTWIDTH] IN BLOOD BY AUTOMATED COUNT: 14.6 % (ref 12.3–15.4)
GLOBULIN SER CALC-MCNC: 3 GM/DL
GLUCOSE SERPL-MCNC: 85 MG/DL (ref 65–99)
HCT VFR BLD AUTO: 40.1 % (ref 34–46.6)
HDLC SERPL-MCNC: 67 MG/DL (ref 40–60)
HGB BLD-MCNC: 12.6 G/DL (ref 12–15.9)
IMM GRANULOCYTES # BLD AUTO: 0.01 10*3/MM3 (ref 0–0.05)
IMM GRANULOCYTES NFR BLD AUTO: 0.3 % (ref 0–0.5)
IRON SATN MFR SERPL: 18 % (ref 20–50)
IRON SERPL-MCNC: 64 MCG/DL (ref 37–145)
LDLC SERPL CALC-MCNC: 68 MG/DL (ref 0–100)
LDLC/HDLC SERPL: 1.04 {RATIO}
LYMPHOCYTES # BLD AUTO: 0.95 10*3/MM3 (ref 0.7–3.1)
LYMPHOCYTES NFR BLD AUTO: 28.9 % (ref 19.6–45.3)
MCH RBC QN AUTO: 23.6 PG (ref 26.6–33)
MCHC RBC AUTO-ENTMCNC: 31.4 G/DL (ref 31.5–35.7)
MCV RBC AUTO: 75.1 FL (ref 79–97)
MONOCYTES # BLD AUTO: 0.36 10*3/MM3 (ref 0.1–0.9)
MONOCYTES NFR BLD AUTO: 10.9 % (ref 5–12)
NEUTROPHILS # BLD AUTO: 1.91 10*3/MM3 (ref 1.7–7)
NEUTROPHILS NFR BLD AUTO: 58.1 % (ref 42.7–76)
NRBC BLD AUTO-RTO: 0 /100 WBC (ref 0–0.2)
PLATELET # BLD AUTO: 174 10*3/MM3 (ref 140–450)
POTASSIUM SERPL-SCNC: 4.3 MMOL/L (ref 3.5–5.2)
PROT SERPL-MCNC: 7.3 G/DL (ref 6–8.5)
RBC # BLD AUTO: 5.34 10*6/MM3 (ref 3.77–5.28)
SODIUM SERPL-SCNC: 141 MMOL/L (ref 136–145)
TIBC SERPL-MCNC: 347 MCG/DL
TRIGL SERPL-MCNC: 48 MG/DL (ref 0–150)
UIBC SERPL-MCNC: 283 MCG/DL (ref 112–346)
VLDLC SERPL CALC-MCNC: 11 MG/DL (ref 5–40)
WBC # BLD AUTO: 3.29 10*3/MM3 (ref 3.4–10.8)

## 2025-07-24 ENCOUNTER — OFFICE VISIT (OUTPATIENT)
Dept: FAMILY MEDICINE CLINIC | Facility: CLINIC | Age: 65
End: 2025-07-24
Payer: COMMERCIAL

## 2025-07-24 VITALS
BODY MASS INDEX: 26.92 KG/M2 | DIASTOLIC BLOOD PRESSURE: 84 MMHG | RESPIRATION RATE: 16 BRPM | SYSTOLIC BLOOD PRESSURE: 130 MMHG | HEART RATE: 105 BPM | WEIGHT: 171.5 LBS | HEIGHT: 67 IN | OXYGEN SATURATION: 98 %

## 2025-07-24 DIAGNOSIS — Z00.00 ANNUAL PHYSICAL EXAM: ICD-10-CM

## 2025-07-24 DIAGNOSIS — E61.1 IRON DEFICIENCY: ICD-10-CM

## 2025-07-24 DIAGNOSIS — E78.00 HYPERCHOLESTEROLEMIA: ICD-10-CM

## 2025-07-24 DIAGNOSIS — Z12.11 COLON CANCER SCREENING: Primary | ICD-10-CM

## 2025-07-24 DIAGNOSIS — I10 ESSENTIAL HYPERTENSION: ICD-10-CM

## 2025-07-24 PROCEDURE — 99396 PREV VISIT EST AGE 40-64: CPT | Performed by: INTERNAL MEDICINE

## 2025-07-24 NOTE — PROGRESS NOTES
Subjective   Mehnaz Denney is a 64 y.o. female. Patient is here today for   Chief Complaint   Patient presents with    Annual Exam          Vitals:    25 1045   BP: 130/84   Pulse: 105   Resp: 16   SpO2: 98%     Body mass index is 26.85 kg/m².    The following portions of the patient's history were reviewed and updated as appropriate: allergies, current medications, past family history, past medical history, past social history, past surgical history and problem list.    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    PONV (postoperative nausea and vomiting)     Post-menopause bleeding     Uterine fibroid       No Known Allergies   Social History     Socioeconomic History    Marital status: Single   Tobacco Use    Smoking status: Never     Passive exposure: Never    Smokeless tobacco: Never   Vaping Use    Vaping status: Never Used   Substance and Sexual Activity    Alcohol use: Not Currently     Alcohol/week: 1.0 standard drink of alcohol     Types: 1 Glasses of wine per week     Comment: Occ//Caffeine use: 2-3 cups daily    Drug use: Never    Sexual activity: Defer     Partners: Male     Birth control/protection: Tubal ligation        Current Outpatient Medications:     amLODIPine (NORVASC) 5 MG tablet, TAKE 1 TABLET BY MOUTH DAILY *PAST DUE FOR LABS AND APPT*, Disp: 30 tablet, Rfl: 0    benzonatate (Tessalon Perles) 100 MG capsule, Take 1 capsule by mouth 3 (Three) Times a Day As Needed for Cough., Disp: 30 capsule, Rfl: 0    FeroSul 325 (65 Fe) MG tablet, TAKE 1 TABLET BY MOUTH DAILY WITH BREAKFAST, Disp: 90 tablet, Rfl: 0    Multiple Vitamin (MULTI-VITAMIN DAILY PO), Take 1 tablet by mouth Daily., Disp: , Rfl:      rosuvastatin (CRESTOR) 10 MG tablet, TAKE 1 TABLET BY MOUTH DAILY, Disp: 90 tablet, Rfl: 3     EKG Procedures    ECG Report    Objective   Mehnaz Denney 64 y.o. female who presents for an Annual physical exam.   Labs results discussed in detail with the patient.  Plan to update vaccines if needed today.  History of Present Illness  She is here today for an annual physical exam.    She is feeling well.    She has no complaints.    History of Present Illness  The patient presents for evaluation of iron deficiency, high cholesterol, and kidney stones.    She reports experiencing low iron levels, which she attributes to inconsistent intake of her iron supplements. She mentions that the supplements do not cause constipation if she maintains a diet rich in spinach and broccoli. She also takes laxatives as needed. Incorporating more leafy greens into her diet has improved her bowel movements. She recalls a period of anemia during which she consumed large quantities of ice daily, a habit she no longer has.    She is due for a mammogram and colon cancer screening but has not yet scheduled these appointments due to recent travel and relocation. She plans to schedule these appointments soon.    She is currently on rosuvastatin for high cholesterol and is considering discontinuing it. Her diet includes salmon, baked chicken, turkey, and almonds, with occasional red meat consumption. She does not smoke and attempts to exercise at least three days a week. She uses Mrs. Alvarez seasoning in her food.    In 2023, she had a CT scan of her abdomen and pelvis due to a history of kidney stones. In 07/2024, she was diagnosed with three small kidney stones, which were too small for shockwave treatment. She was advised to increase her water intake and monitor the size of the stones through x-rays.    She is on medication for high blood pressure.    Social History:  Occupations: Works in payroll at [employer]  Diet:  Includes salmon, baked chicken, turkey, almonds, and occasional red meat  Tobacco: Does not smoke  Coffee/Tea/Caffeine-containing Drinks: Drinks coffee in the morning  Living Condition: Recently moved, currently lives near one of her daughters    FAMILY HISTORY  Her daughter has high cholesterol.    Health Habits:  Dental Exam. up to date  Eye Exam. up to date  Exercise: 4 times/week.  Current exercise activities include: not asked    Preventative counseling  Discussed face to face the importance of healthy diet and exercise.     Lab Results (most recent)       None              Review of Systems   Constitutional: Negative.    HENT: Negative.     Respiratory: Negative.     Cardiovascular: Negative.    Genitourinary: Negative.    Musculoskeletal: Negative.    Neurological: Negative.    Psychiatric/Behavioral: Negative.         Physical Exam  Vitals and nursing note reviewed.   Constitutional:       General: She is not in acute distress.     Appearance: Normal appearance. She is not ill-appearing, toxic-appearing or diaphoretic.      Comments: Pleasant, neatly groomed, no distress.  BMI 26.8   HENT:      Head: Normocephalic and atraumatic.      Right Ear: Tympanic membrane, ear canal and external ear normal. There is no impacted cerumen.      Left Ear: Tympanic membrane, ear canal and external ear normal. There is no impacted cerumen.      Nose: Nose normal.      Mouth/Throat:      Mouth: Mucous membranes are moist.      Pharynx: No oropharyngeal exudate or posterior oropharyngeal erythema.   Eyes:      General:         Right eye: No discharge.         Left eye: No discharge.      Extraocular Movements: Extraocular movements intact.      Conjunctiva/sclera: Conjunctivae normal.   Neck:      Vascular: No carotid bruit.   Cardiovascular:      Rate and Rhythm: Tachycardia present. Rhythm irregular.      Pulses: Normal pulses.      Heart sounds: Normal heart sounds. No murmur heard.     No gallop.   Pulmonary:       Effort: Pulmonary effort is normal. No respiratory distress.      Breath sounds: Normal breath sounds. No stridor. No wheezing, rhonchi or rales.   Chest:      Chest wall: No tenderness.   Abdominal:      General: Abdomen is flat.      Palpations: Abdomen is soft. There is no mass.      Tenderness: There is no abdominal tenderness. There is no right CVA tenderness, left CVA tenderness, guarding or rebound.      Hernia: No hernia is present.   Genitourinary:     Comments: Deferred to gynecology.  Musculoskeletal:      Cervical back: Normal range of motion and neck supple. No rigidity or tenderness.   Lymphadenopathy:      Cervical: No cervical adenopathy.   Skin:     General: Skin is warm and dry.      Capillary Refill: Capillary refill takes less than 2 seconds.      Coloration: Skin is not jaundiced or pale.      Findings: No bruising, erythema, lesion or rash.   Neurological:      General: No focal deficit present.      Mental Status: She is alert and oriented to person, place, and time.      Motor: No weakness.      Coordination: Coordination normal.      Gait: Gait normal.      Deep Tendon Reflexes: Reflexes normal.   Psychiatric:         Mood and Affect: Mood normal.         Behavior: Behavior normal.         Thought Content: Thought content normal.         Judgment: Judgment normal.       Physical Exam  Neck: No thyroid lumps or bumps.  Respiratory: Clear to auscultation, no wheezing, rales or rhonchi.  Cardiovascular: Regular rate and rhythm, no murmurs, rubs, or gallops.    Results  Labs   - Iron level: Normal   - Iron saturation: 18%   - White blood cells: Slightly low   - MCV: Low   - LDL cholesterol: 68   - HDL cholesterol: 67    Assessment & Plan      Problems Addressed this Visit          Cardiac and Vasculature    Essential hypertension    Hypercholesterolemia       Gastrointestinal Abdominal     Colon cancer screening - Primary       Health Encounters    Annual physical exam       Hematology and  Neoplasia    Iron deficiency     Diagnoses         Codes Comments      Colon cancer screening    -  Primary ICD-10-CM: Z12.11  ICD-9-CM: V76.51       Iron deficiency     ICD-10-CM: E61.1  ICD-9-CM: 280.9       Essential hypertension     ICD-10-CM: I10  ICD-9-CM: 401.9       Hypercholesterolemia     ICD-10-CM: E78.00  ICD-9-CM: 272.0       Annual physical exam     ICD-10-CM: Z00.00  ICD-9-CM: V70.0           Assessment & Plan  1. Iron deficiency.  - Iron levels are within the normal range, but carrier protein saturation is insufficient, indicating an iron deficiency.  - MCV is low, suggesting small red blood cell size, which is common in iron deficiency.  - No symptoms related to low iron levels reported.  - Advised to continue iron supplements and incorporate more green leafy vegetables into the diet.    2. High cholesterol.  - LDL cholesterol levels have shown significant improvement since starting rosuvastatin.  - LDL cholesterol was 126 in 03/2020, 151 in 09/2020, 140 in 03/2021, and 89 in 07/2021.  - Advised to continue current medication regimen and maintain a healthy diet.    3. Kidney stones.  - History of kidney stones, with three small stones identified in 07/2024.  - She has a follow-up appointment with urology  next month for an x-ray to check if the stones have increased in size.  - No current symptoms from kidney stones.    4. Health maintenance.  - Due for mammogram and colon cancer screening; Cologuard test will be ordered.  - Due for pneumococcal pneumonia vaccine and will receive one today.    5. Low white blood cell count.  - White blood cell count is slightly low, which occurs occasionally.  - Follow-up appointment will be scheduled in 6 months to recheck white blood cell count.    6. Blood pressure management.  - Blood pressure readings are within the acceptable range.    Follow-up: 6 months for white blood cell count recheck.  Like to check a lipid profile prior to that visit as well.      There  are no Patient Instructions on file for this visit.    No follow-ups on file.  Patient was given instructions and counseling regarding her  condition for health maintenance advice.  Please see specific information pulled into the AVS if appropriate.     Patient or patient representative verbalized consent for the use of Ambient Listening during the visit with  Efrem Cantrell MD for chart documentation. 7/24/2025  16:44 EDT

## 2025-08-12 RX ORDER — AMLODIPINE BESYLATE 5 MG/1
5 TABLET ORAL DAILY
Qty: 90 TABLET | Refills: 1 | Status: SHIPPED | OUTPATIENT
Start: 2025-08-12

## (undated) DEVICE — SPNG GZ WOVN 4X4IN 12PLY 10/BX STRL

## (undated) DEVICE — GLV SURG BIOGEL LTX PF 7

## (undated) DEVICE — UNDYED BRAIDED (POLYGLACTIN 910), SYNTHETIC ABSORBABLE SUTURE: Brand: COATED VICRYL

## (undated) DEVICE — TIDISHIELD UROLOGY DRAIN BAGS FROSTY VINYL STERILE FITS SIEMENS UROSKOP ACCESS 20 PER CASE: Brand: TIDISHIELD

## (undated) DEVICE — DRSNG SURESITE WNDW 4X4.5

## (undated) DEVICE — 3M™ STERI-STRIP™ REINFORCED ADHESIVE SKIN CLOSURES, R1547, 1/2 IN X 4 IN (12 MM X 100 MM), 6 STRIPS/ENVELOPE: Brand: 3M™ STERI-STRIP™

## (undated) DEVICE — GLV SURG BIOGEL LTX PF 6 1/2

## (undated) DEVICE — SUT VIC 0 CT2 CR8 18IN DYED J727D

## (undated) DEVICE — CONTAINER,SPECIMEN,OR STERILE,4OZ: Brand: MEDLINE

## (undated) DEVICE — TOTAL TRAY, 16FR 10ML SIL FOLEY, URN: Brand: MEDLINE

## (undated) DEVICE — SUT VIC 3/0 CTI 36IN J944H

## (undated) DEVICE — 3M™ STERI-STRIP™ COMPOUND BENZOIN TINCTURE 40 BAGS/CARTON 4 CARTONS/CASE C1544: Brand: 3M™ STERI-STRIP™

## (undated) DEVICE — DRSNG TELFA PAD NONADH STR 1S 3X8IN

## (undated) DEVICE — TOWEL,OR,DSP,ST,BLUE,STD,4/PK,20PK/CS: Brand: MEDLINE

## (undated) DEVICE — NITINOL WIRE WITH HYDROPHILIC TIP: Brand: SENSOR

## (undated) DEVICE — SUT VIC 2/0 CT1 36IN

## (undated) DEVICE — LOU CYSTO: Brand: MEDLINE INDUSTRIES, INC.

## (undated) DEVICE — PENCL E/S ULTRAVAC TELESCP NOSE HOLSTR 10FT

## (undated) DEVICE — PK HYST ABD 40

## (undated) DEVICE — TRAP FLD MINIVAC MEGADYNE 100ML

## (undated) DEVICE — SUT VIC 0 CT1 36IN J946H

## (undated) DEVICE — SYR LUERLOK 20CC BX/50

## (undated) DEVICE — PATIENT RETURN ELECTRODE, SINGLE-USE, CONTACT QUALITY MONITORING, ADULT, WITH 9FT CORD, FOR PATIENTS WEIGING OVER 33LBS. (15KG): Brand: MEGADYNE

## (undated) DEVICE — SUT VIC 0 TIES 18IN J912G

## (undated) DEVICE — CATH URETRL FLXITP POLLACK STD 5F 70CM